# Patient Record
Sex: MALE | Race: WHITE | Employment: OTHER | URBAN - METROPOLITAN AREA
[De-identification: names, ages, dates, MRNs, and addresses within clinical notes are randomized per-mention and may not be internally consistent; named-entity substitution may affect disease eponyms.]

---

## 2017-09-29 ENCOUNTER — OFFICE VISIT (OUTPATIENT)
Dept: INTERNAL MEDICINE | Facility: CLINIC | Age: 62
End: 2017-09-29
Payer: COMMERCIAL

## 2017-09-29 ENCOUNTER — NURSE TRIAGE (OUTPATIENT)
Dept: NURSING | Facility: CLINIC | Age: 62
End: 2017-09-29

## 2017-09-29 VITALS
HEART RATE: 72 BPM | WEIGHT: 237.9 LBS | DIASTOLIC BLOOD PRESSURE: 78 MMHG | BODY MASS INDEX: 30.53 KG/M2 | SYSTOLIC BLOOD PRESSURE: 120 MMHG | OXYGEN SATURATION: 94 % | HEIGHT: 74 IN | TEMPERATURE: 98.3 F

## 2017-09-29 DIAGNOSIS — M75.91 RIGHT SUPRASPINATUS TENDINITIS: Primary | ICD-10-CM

## 2017-09-29 DIAGNOSIS — Z12.5 SCREENING FOR PROSTATE CANCER: ICD-10-CM

## 2017-09-29 DIAGNOSIS — Z13.6 CARDIOVASCULAR SCREENING; LDL GOAL LESS THAN 160: ICD-10-CM

## 2017-09-29 PROCEDURE — 99203 OFFICE O/P NEW LOW 30 MIN: CPT | Performed by: INTERNAL MEDICINE

## 2017-09-29 RX ORDER — DIPHENOXYLATE HYDROCHLORIDE AND ATROPINE SULFATE 2.5; .025 MG/1; MG/1
TABLET ORAL
COMMUNITY
Start: 2017-05-24 | End: 2020-11-03

## 2017-09-29 RX ORDER — COCONUT OIL
4000 OIL (ML) MISCELLANEOUS
COMMUNITY
Start: 2017-05-24 | End: 2020-11-03

## 2017-09-29 NOTE — NURSING NOTE
"Chief Complaint   Patient presents with     Shoulder Pain       Initial /78  Pulse 72  Temp 98.3  F (36.8  C) (Oral)  Ht 6' 2\" (1.88 m)  Wt 237 lb 14.4 oz (107.9 kg)  SpO2 94%  BMI 30.54 kg/m2 Estimated body mass index is 30.54 kg/(m^2) as calculated from the following:    Height as of this encounter: 6' 2\" (1.88 m).    Weight as of this encounter: 237 lb 14.4 oz (107.9 kg).  Medication Reconciliation: complete    "

## 2017-09-29 NOTE — MR AVS SNAPSHOT
After Visit Summary   9/29/2017    Jaswinder Monroe    MRN: [id number]           Patient Information     Date Of Birth          1955        Visit Information        Provider Department      9/29/2017 7:00 AM Antwan Ron MD [facility]        Today's Diagnoses     Right supraspinatus tendinitis    -  1       Follow-ups after your visit        Additional Services     STEV[facility] PT, HAND, AND CHIROPRACTIC REFERRAL       **This order will print in the [facility] Scheduling Office**    Physical Therapy, Hand Therapy and Chiropractic Care are available through:    *[facility]  *[facility]  *[facility]    Call one number to schedule at any of the above locations: [phone].    Your provider has referred you to: Physical Therapy at [facility] or [facility]    Indication/Reason for Referral: Shoulder Pain  Onset of Illness: month+  Therapy Orders: Evaluate and Treat  Special Programs: None  Special Request: None    Fred Ibarra      Additional Comments for the Therapist or Chiropractor:     Please be aware that coverage of these services is subject to the terms and limitations of your health insurance plan.  Call member services at your health plan with any benefit or coverage questions.      Please bring the following to your appointment:    *Your personal calendar for scheduling future appointments  *Comfortable clothing            SPORTS MEDICINE REFERRAL       Your provider has referred you to:  FM[facility]: [facility] - [facility]  [phone]   [email or web address]    Please be aware that coverage of these services is subject to the terms and limitations of your health insurance plan.  Call member services at your health plan with any benefit or coverage questions.      Please bring the following to your appointment:    >>    "Any x-rays, CTs or MRIs which have been performed.  Contact the facility where they were done to arrange for  prior to your scheduled appointment.    >>   List of current medications   >>   This referral request   >>   Any documents/labs given to you for this referral                  Who to contact     If you have questions or need follow up information about today's clinic visit or your schedule please contact [facility] directly at [phone].  Normal or non-critical lab and imaging results will be communicated to you by everyArthart, letter or phone within 4 business days after the clinic has received the results. If you do not hear from us within 7 days, please contact the clinic through everyArthart or phone. If you have a critical or abnormal lab result, we will notify you by phone as soon as possible.  Submit refill requests through Sentillion or call your pharmacy and they will forward the refill request to us. Please allow 3 business days for your refill to be completed.          Additional Information About Your Visit        Sentillion Information     Sentillion lets you send messages to your doctor, view your test results, renew your prescriptions, schedule appointments and more. To sign up, go to [email or web address] . Click on \"Log in\" on the left side of the screen, which will take you to the Welcome page. Then click on \"Sign up Now\" on the right side of the page.     You will be asked to enter the access code listed below, as well as some personal information. Please follow the directions to create your username and password.     Your access code is: [email or web address]  Expires: 2017  7:46 AM     Your access code will  in 90 days. If you need help or a new code, please call your [facility] clinic or [phone].        Care EveryWhere ID     This is your Care EveryWhere ID. This could be used by other organizations to access your [facility] medical records  [id number]      " "  Your Vitals Were     Pulse Temperature Height Pulse Oximetry BMI (Body Mass Index)       72 98.3  F (36.8  C) (Oral) 6' 2\" (1.88 m) 94% 30.54 kg/m2        Blood Pressure from Last 3 Encounters:   09/29/17 120/78    Weight from Last 3 Encounters:   09/29/17 237 lb 14.4 oz (107.9 kg)              We Performed the Following     STEVIE PT, HAND, AND CHIROPRACTIC REFERRAL     SPORTS MEDICINE REFERRAL        Primary Care Provider    None Specified       No primary provider on file.        Equal Access to Services     DENISE ECKERT : Hadii aad ku hadasho Soomaali, waaxda luqadaha, qaybta kaalmada adeegyada, kevin heath . So [facility] [phone].    ATENCIÓN: Si habla español, tiene a mckeon disposición servicios gratuitos de asistencia lingüística. Llame al [phone].    We comply with applicable federal civil rights laws and [place] laws. We do not discriminate on the basis of race, color, national origin, age, disability sex, sexual orientation or gender identity.            Thank you!     Thank you for choosing [facility]  for your care. Our goal is always to provide you with excellent care. Hearing back from our patients is one way we can continue to improve our services. Please take a few minutes to complete the written survey that you may receive in the mail after your visit with us. Thank you!             Your Updated Medication List - Protect others around you: Learn how to safely use, store and throw away your medicines at www.disposemymeds.org.          This list is accurate as of: 9/29/17  7:46 AM.  Always use your most recent med list.                   Brand Name Dispense Instructions for use Diagnosis    aspirin 81 MG EC tablet      Take 81 mg by mouth        cholecalciferol 5000 UNITS Caps capsule    vitamin D3     Take 5,000 Units by mouth        Coconut Oil Oil           MULTI-VITAMINS Tabs             "

## 2017-09-29 NOTE — PROGRESS NOTES
"  SUBJECTIVE:   Jaswinder Monroe is a 62 year old male who presents to clinic today for the following health issues:      Joint Pain    Onset: 3 months on and off    Description:   Location: right shoulder  Character: Dull ache    Intensity: mild    Progression of Symptoms: same    Accompanying Signs & Symptoms:  Other symptoms: none    History:   Previous similar pain: no       Precipitating factors:   Trauma or overuse: no     Alleviating factors:  Improved by: rest/inactivity    Therapies Tried and outcome: none      Problem list and histories reviewed & adjusted, as indicated.  Additional history: as documented    Labs reviewed in EPIC    Reviewed and updated as needed this visit by clinical staffAllergies       Reviewed and updated as needed this visit by Provider         ROS:  Constitutional, HEENT, cardiovascular, pulmonary, gi and gu systems are negative, except as otherwise noted.      OBJECTIVE:                                                    /78  Pulse 72  Temp 98.3  F (36.8  C) (Oral)  Ht 6' 2\" (1.88 m)  Wt 237 lb 14.4 oz (107.9 kg)  SpO2 94%  BMI 30.54 kg/m2  Body mass index is 30.54 kg/(m^2).  GENERAL APPEARANCE: healthy, alert and no distress  EYES: Eyes grossly normal to inspection  NECK: no adenopathy  CV: regular rates and rhythm  MS: R shoulder tender w/abduction above 150d and external rotation   SKIN: no suspicious lesions or rashes    Diagnostic test results:  none        ASSESSMENT/PLAN:                                                    1. Right supraspinatus tendinitis  Looks to be bursitis/tendonitis > tear. See STEVIE . If rehab fails then see sports med  - SPORTS MEDICINE REFERRAL  - [facility] PT, HAND, AND CHIROPRACTIC REFERRAL      Antwan Ron MD  [facility]  "

## 2017-10-11 ENCOUNTER — THERAPY VISIT (OUTPATIENT)
Dept: PHYSICAL THERAPY | Facility: CLINIC | Age: 62
End: 2017-10-11
Payer: COMMERCIAL

## 2017-10-11 DIAGNOSIS — M25.511 ACUTE PAIN OF RIGHT SHOULDER: Primary | ICD-10-CM

## 2017-10-11 PROCEDURE — 97110 THERAPEUTIC EXERCISES: CPT | Mod: GP | Performed by: PHYSICAL THERAPIST

## 2017-10-11 PROCEDURE — 97161 PT EVAL LOW COMPLEX 20 MIN: CPT | Mod: GP | Performed by: PHYSICAL THERAPIST

## 2017-10-11 NOTE — MR AVS SNAPSHOT
After Visit Summary   10/11/2017    Jaswinder Monroe    MRN: [id number]           Patient Information     Date Of Birth          1955        Visit Information        Provider Department      10/11/2017 3:20 PM Susu Echevarria PT [facility] Physical Therapy        Today's Diagnoses     Acute pain of right shoulder    -  1       Follow-ups after your visit        Who to contact     If you have questions or need follow up information about today's clinic visit or your schedule please contact [facility] PHYSICAL THERAPY directly at [phone].  Normal or non-critical lab and imaging results will be communicated to you by Cogenta Systemshart, letter or phone within 4 business days after the clinic has received the results. If you do not hear from us within 7 days, please contact the clinic through OneTrueFant or phone. If you have a critical or abnormal lab result, we will notify you by phone as soon as possible.  Submit refill requests through Plaxo or call your pharmacy and they will forward the refill request to us. Please allow 3 business days for your refill to be completed.          Additional Information About Your Visit        MyChart Information     Plaxo gives you secure access to your electronic health record. If you see a primary care provider, you can also send messages to your care team and make appointments. If you have questions, please call your primary care clinic.  If you do not have a primary care provider, please call [phone] and they will assist you.        Care EveryWhere ID     This is your Care EveryWhere ID. This could be used by other organizations to access your [facility] medical records  [id number]         Blood Pressure from Last 3 Encounters:   09/29/17 120/78    Weight from Last 3 Encounters:   09/29/17 107.9 kg (237 lb 14.4 oz)              We Performed the Following     SETVIE Inital Eval Report     PT Alex,  Low Complexity (78340)     Therapeutic Exercises        Primary Care Provider    None Specified       No primary provider on file.        Equal Access to Services     DENISE ECKERT : Hadii angel luis Laurent, oleg raymundo, patriasusie velezkaurjesu may, kevin renlinaanila hardy. So [facility] [phone].    ATENCIÓN: Si habla español, tiene a mckeon disposición servicios gratuitos de asistencia lingüística. Llame al [phone].    We comply with applicable federal civil rights laws and [place] laws. We do not discriminate on the basis of race, color, national origin, age, disability, sex, sexual orientation, or gender identity.            Thank you!     Thank you for choosing [facility] PHYSICAL THERAP[facility]  for your care. Our goal is always to provide you with excellent care. Hearing back from our patients is one way we can continue to improve our services. Please take a few minutes to complete the written survey that you may receive in the mail after your visit with us. Thank you!             Your Updated Medication List - Protect others around you: Learn how to safely use, store and throw away your medicines at www.disposemymeds.org.          This list is accurate as of: 10/11/17  7:35 PM.  Always use your most recent med list.                   Brand Name Dispense Instructions for use Diagnosis    aspirin 81 MG EC tablet      Take 81 mg by mouth        cholecalciferol 5000 UNITS Caps capsule    vitamin D3     Take 5,000 Units by mouth        Coconut Oil Oil           MULTI-VITAMINS Tabs

## 2017-10-11 NOTE — PROGRESS NOTES
Subjective:    Patient is a 62 year old male presenting with rehab right shoulder hpi. The history is provided by the patient.   Jaswinder Monroe is a 62 year old male with a right shoulder condition.  Condition occurred with:  Unknown cause.  Condition occurred: for unknown reasons.  This is a chronic condition  Onset of R shoulder pain 07/11/2017 for unknown reasons--just started noticing it.  It stayed flared-up for about a week and then resolved.  Flared-up again a few weeks later for about a week again and then resolved which was about 1 month ago now.  Currently has minimal to no pain and no activity limitations other than avoiding lifting since he thinks it might produce pain.  Patient is right-handed.    Patient reports pain:  Anterior.     and is intermittent and reported as 1/10.  Associated symptoms:  Loss of strength. Pain is the same all the time.  Exacerbated by: avoids lifting and more strenuous activity, unable to throw a ball. and relieved by rest.  Since onset symptoms are unchanged.  Special testing: none.  Previous treatment: none.    General health as reported by patient is excellent.  Pertinent medical history includes:  None.  Medical allergies: no.  Other surgeries include:  None reported.  Current medications:  None as reported by the patient.  Current occupation is .        Barriers include:  None as reported by the patient.    Red flags:  None as reported by the patient.                        Objective:    Standing Alignment:      Shoulder/UE:  Rounded shoulders, protracted scapula L and protracted scapula R                                       Shoulder Evaluation:  ROM:  AROM:  normal                            PROM:  normal (mild pain with overpressure into abduction initially but resolved with more reps of abduction)                            Pain: Concordant sign of decreased R ER strength--no effect after trials of repeated wand extension, wand extension with hand  on counter, IR/extension loaded.    Strength:    Flexion: Right: 5/5      Pain:  -    Abduction:  Right: 5/5      Pain:-    Internal Rotation:  Right: 5/5      Pain:-  External Rotation:   Right:4+/5      Pain:-                                                     General     ROS    Assessment/Plan:      Patient is a 62 year old male with right side shoulder complaints.  Symptoms were very mild with evaluation today.  The only concordant signs evident were pain with end range IR/extension on the R and decreased R ER strength.  Repeated motions in extension and IR/extension had no effect, and responses were difficult to assess due to his symptoms being so mild.  He was instructed in scapular and ER strengthening exercises to improve posture and shoulder mechanics and also instructed to call and follow-up if/when symptoms flared-up again for further assessment.  He will follow-up as needed.      Patient has the following significant findings with corresponding treatment plan.                Diagnosis 1:  R shoulder pain  Pain -  self management, education, directional preference exercise and home program  Decreased strength - therapeutic exercise, therapeutic activities and home program  Decreased function - therapeutic activities and home program  Impaired posture - neuro re-education and home program    Therapy Evaluation Codes:   1) History comprised of:   Personal factors that impact the plan of care:      None.    Comorbidity factors that impact the plan of care are:      None.     Medications impacting care: None.  2) Examination of Body Systems comprised of:   Body structures and functions that impact the plan of care:      Shoulder.   Activity limitations that impact the plan of care are:      Lifting.  3) Clinical presentation characteristics are:   Stable/Uncomplicated.  4) Decision-Making    Low complexity using standardized patient assessment instrument and/or measureable assessment of functional  outcome.  Cumulative Therapy Evaluation is: Low complexity.    Previous and current functional limitations:  (See Goal Flow Sheet for this information)    Short term and Long term goals: (See Goal Flow Sheet for this information)     Communication ability:  Patient appears to be able to clearly communicate and understand verbal and written communication and follow directions correctly.  Treatment Explanation - The following has been discussed with the patient:   RX ordered/plan of care  Anticipated outcomes  Possible risks and side effects  This patient would benefit from PT intervention to resume normal activities.   Rehab potential is good.    Frequency:  1 X a month, once daily  Duration:  for 2 months  Discharge Plan:  Achieve all LTG.  Independent in home treatment program.  Reach maximal therapeutic benefit.    Please refer to the daily flowsheet for treatment today, total treatment time and time spent performing 1:1 timed codes.

## 2017-10-20 ENCOUNTER — TRANSFERRED RECORDS (OUTPATIENT)
Dept: INTERNAL MEDICINE | Facility: CLINIC | Age: 62
End: 2017-10-20

## 2017-10-20 NOTE — PROGRESS NOTES
Received transfer of records from [facility]. Sent to Dr Ron.  Electronically filed by Kim Mcardle     10/20/2017  4:15 PM

## 2017-11-01 ENCOUNTER — OFFICE VISIT (OUTPATIENT)
Dept: INTERNAL MEDICINE | Facility: CLINIC | Age: 62
End: 2017-11-01
Payer: COMMERCIAL

## 2017-11-01 VITALS
DIASTOLIC BLOOD PRESSURE: 78 MMHG | OXYGEN SATURATION: 94 % | TEMPERATURE: 98.2 F | SYSTOLIC BLOOD PRESSURE: 108 MMHG | HEART RATE: 70 BPM | BODY MASS INDEX: 30.67 KG/M2 | WEIGHT: 239 LBS | HEIGHT: 74 IN

## 2017-11-01 DIAGNOSIS — Z12.5 SCREENING FOR PROSTATE CANCER: ICD-10-CM

## 2017-11-01 DIAGNOSIS — Z12.11 COLON CANCER SCREENING: ICD-10-CM

## 2017-11-01 DIAGNOSIS — E78.2 MIXED HYPERLIPIDEMIA: ICD-10-CM

## 2017-11-01 DIAGNOSIS — R97.20 ELEVATED PROSTATE SPECIFIC ANTIGEN (PSA): ICD-10-CM

## 2017-11-01 DIAGNOSIS — L72.3 SEBACEOUS CYST: ICD-10-CM

## 2017-11-01 DIAGNOSIS — Z23 NEED FOR PROPHYLACTIC VACCINATION AND INOCULATION AGAINST INFLUENZA: ICD-10-CM

## 2017-11-01 DIAGNOSIS — Z00.01 ENCOUNTER FOR ROUTINE ADULT MEDICAL EXAM WITH ABNORMAL FINDINGS: Primary | ICD-10-CM

## 2017-11-01 DIAGNOSIS — Z13.6 CARDIOVASCULAR SCREENING; LDL GOAL LESS THAN 160: ICD-10-CM

## 2017-11-01 LAB
ANION GAP SERPL CALCULATED.3IONS-SCNC: 9 MMOL/L (ref 3–14)
BUN SERPL-MCNC: 16 MG/DL (ref 7–30)
CALCIUM SERPL-MCNC: 9.2 MG/DL (ref 8.5–10.1)
CHLORIDE SERPL-SCNC: 104 MMOL/L (ref 94–109)
CHOLEST SERPL-MCNC: 226 MG/DL
CO2 SERPL-SCNC: 26 MMOL/L (ref 20–32)
CREAT SERPL-MCNC: 1.13 MG/DL (ref 0.66–1.25)
GFR SERPL CREATININE-BSD FRML MDRD: 66 ML/MIN/1.7M2
GLUCOSE SERPL-MCNC: 97 MG/DL (ref 70–99)
HDLC SERPL-MCNC: 43 MG/DL
LDLC SERPL CALC-MCNC: 155 MG/DL
NONHDLC SERPL-MCNC: 183 MG/DL
POTASSIUM SERPL-SCNC: 4.2 MMOL/L (ref 3.4–5.3)
PSA SERPL-ACNC: 4.08 UG/L (ref 0–4)
SODIUM SERPL-SCNC: 139 MMOL/L (ref 133–144)
TRIGL SERPL-MCNC: 138 MG/DL

## 2017-11-01 PROCEDURE — G0103 PSA SCREENING: HCPCS | Performed by: INTERNAL MEDICINE

## 2017-11-01 PROCEDURE — 36415 COLL VENOUS BLD VENIPUNCTURE: CPT | Performed by: INTERNAL MEDICINE

## 2017-11-01 PROCEDURE — 99396 PREV VISIT EST AGE 40-64: CPT | Performed by: INTERNAL MEDICINE

## 2017-11-01 PROCEDURE — 80061 LIPID PANEL: CPT | Performed by: INTERNAL MEDICINE

## 2017-11-01 PROCEDURE — 80048 BASIC METABOLIC PNL TOTAL CA: CPT | Performed by: INTERNAL MEDICINE

## 2017-11-01 NOTE — MR AVS SNAPSHOT
After Visit Summary   11/1/2017    Jaswinder Monroe    MRN: [id number]           Patient Information     Date Of Birth          1955        Visit Information        Provider Department      11/1/2017 7:00 AM Antwan Ron MD [facility]        Today's Diagnoses     Encounter for routine adult medical exam with abnormal findings    -  1    Need for prophylactic vaccination and inoculation against influenza        Screening for prostate cancer        CARDIOVASCULAR SCREENING; LDL GOAL LESS THAN 160        Sebaceous cyst        Colon cancer screening          Care Instructions    The heart-healthy Mediterranean is a healthy eating plan based on typical foods and recipes of Mediterranean-style cooking. Here's how to adopt the Mediterranean diet.   If you're looking for a heart-healthy eating plan, the Mediterranean diet might be right for you. The Mediterranean diet incorporates the basics of healthy eating -- plus a splash of flavorful olive oil and perhaps even a glass of red wine -- among other components characterizing the traditional cooking style of countries bordering the [place].  Most healthy diets include fruits, vegetables, fish and whole grains, and limit unhealthy fats. While these parts of a healthy diet remain tried-and-true, subtle variations or differences in proportions of certain foods may make a difference in your risk of heart disease.  Research has shown that the traditional Mediterranean diet reduces the risk of heart disease. In fact, an analysis of more than 1.5 million healthy adults demonstrated that following a Mediterranean diet was associated with a reduced risk of death from heart disease and cancer, as well as a reduced incidence of Parkinson's and Alzheimer's diseases.   The Dietary Guidelines for Americans recommends the Mediterranean diet as an eating plan that can help promote health and prevent disease. And the Mediterranean  "diet is one your whole family can follow for good health.   The Mediterranean diet emphasizes:  Eating primarily plant-based foods, such as fruits and vegetables, whole grains, legumes and nuts   Replacing butter with healthy fats, such as olive oil   Using herbs and spices instead of salt to flavor foods   Limiting red meat to no more than a few times a month   Eating fish and poultry at least twice a week   Drinking red wine in moderation (optional)  The diet also recognizes the importance of being physically active, and enjoying meals with family and friends.  The Mediterranean diet traditionally includes fruits, vegetables and grains. For example, residents of [facility] average six or more servings a day of antioxidant-rich fruits and vegetables.  Grains in the Mediterranean region are typically whole grain and usually contain very few unhealthy trans fats, and bread is an important part of the diet. However, throughout the Mediterranean region, bread is eaten plain or dipped in olive oil -- not eaten with butter or margarine, which contains saturated or trans fats.   Nuts are another part of a healthy Mediterranean diet. Nuts are high in fat, but most of the fat is healthy. Because nuts are high in calories, they should not be eaten in large amounts -- generally no more than a handful a day. For the best nutrition, avoid candied or honey-roasted and heavily salted nuts.  The focus of the Mediterranean diet isn't on limiting total fat consumption, but rather on choosing healthier types of fat. The Mediterranean diet discourages saturated fats and hydrogenated oils (trans fats), both of which contribute to heart disease.  The Mediterranean diet features olive oil as the primary source of fat. Olive oil is mainly monounsaturated fat -- a type of fat that can help reduce low-density lipoprotein (LDL) cholesterol levels when used in place of saturated or trans fats. \"Extra-virgin\" and \"virgin\" olive oils (the least " processed forms) also contain the highest levels of protective plant compounds that provide antioxidant effects.  Canola oil and some nuts contain the beneficial linolenic acid (a type of omega-3 fatty acid) in addition to healthy unsaturated fat. Omega-3 fatty acids lower triglycerides, decrease blood clotting, and are associated with decreased incidence of sudden heart attacks, improve the health of your blood vessels, and help moderate blood pressure. Fatty fish -- such as mackerel, lake trout, herring, sardines, albacore tuna and salmon -- are rich sources of omega-3 fatty acids. Fish is eaten on a regular basis in the Mediterranean diet.  The health effects of alcohol have been debated for many years, and some doctors are reluctant to encourage alcohol consumption because of the health consequences of excessive drinking. However, alcohol -- in moderation -- has been associated with a reduced risk of heart disease in some research studies.  The Mediterranean diet typically includes a moderate amount of wine, usually red wine. This means no more than 5 ounces (148 milliliters) of wine daily for women of all ages and men older than age 65 and no more than 10 ounces (296 milliliters) of wine daily for younger men. More than this may increase the risk of health problems, including increased risk of certain types of cancer.   If you're unable to limit your alcohol intake to the amounts defined above, if you have a personal or family history of alcohol abuse, or if you have heart or liver disease, refrain from drinking wine or any other alcohol.  The Mediterranean diet is a delicious and healthy way to eat. Many people who switch to this style of eating say they'll never eat any other way. Here are some specific steps to get you started:   Eat your veggies and fruits -- and switch to whole grains. Avariety of plant foods should make up the majority of your meals. They should be minimally processed -- fresh and whole  are best. Include veggies and fruits in every meal and eat them for snacks as well. Switch to whole-grain bread and cereal, and begin to eat more whole-grain rice and pasta products. Keep baby carrots, apples and bananas on hand for quick, satisfying snacks. Fruit salads are a wonderful way to eat a variety of healthy fruit.   Go nuts. Nuts and seeds are good sources of fiber, protein and healthy fats. Keep almonds, cashews, pistachios and walnuts on hand for a quick snack. Choose natural peanut butter, rather than the kind with hydrogenated fat added. Try blended sesame seeds (tahini) as a dip or spread for bread.   Pass on the butter. Try olive or canola oil as a healthy replacement for butter or margarine. Lightly drizzle it over vegetables. After cooking pasta, add a touch of olive oil, some garlic and green onions for flavoring. Dip bread in flavored olive oil or lightly spread it on whole-grain bread for a tasty alternative to butter. Try tahini as a dip or spread for bread too.   Spice it up. Herbs and spices make food tasty and can  for salt and fat in recipes.   Go fish. Eat fish at least twice a week. Fresh or water-packed tuna, salmon, trout, mackerel and herring are healthy choices. [place], bake or broil fish for great taste and easy cleanup. Avoid breaded and fried fish.   Rein in the red meat. Limit red meat to no more than a few times a month. Substitute fish and poultry for red meat. When choosing red meat, make sure it's lean and keep portions small (about the size of a deck of cards). Also avoid sausage, hernandez and other high-fat, processed meats.   Choose low-fat dairy. Limit higher fat dairy products, such as whole or 2 percent milk, cheese and ice cream. Switch to skim milk, fat-free yogurt and low-fat cheese            Follow-ups after your visit        Additional Services     GASTROENTEROLOGY ADULT REF PROCEDURE ONLY       Last Lab Result: No results found for: CR  Body mass index is  30.69 kg/(m^2).     Needed:  No  Language:  English    Patient will be contacted to schedule procedure.     Please be aware that coverage of these services is subject to the terms and limitations of your health insurance plan.  Call member services at your health plan with any benefit or coverage questions.  Any procedures must be performed at a [facility] facility OR coordinated by your clinic's referral office.    Please bring the following with you to your appointment:    (1) Any X-Rays, CTs or MRIs which have been performed.  Contact the facility where they were done to arrange for  prior to your scheduled appointment.    (2) List of current medications   (3) This referral request   (4) Any documents/labs given to you for this referral            GENERAL SURG ADULT REFERRAL       Your provider has referred you to: PHANI: [facility] -  [place] [phone]   [email or web address]  [place] [phone]   [email or web address]    Please be aware that coverage of these services is subject to the terms and limitations of your health insurance plan.  Call member services at your health plan with any benefit or coverage questions.      Please bring the following with you to your appointment:    (1) Any X-Rays, CTs or MRIs which have been performed.  Contact the facility where they were done to arrange for  prior to your scheduled appointment.   (2) List of current medications   (3) This referral request   (4) Any documents/labs given to you for this referral                  Who to contact     If you have questions or need follow up information about today's clinic visit or your schedule please contact [facility] directly at [phone].  Normal or non-critical lab and imaging results will be communicated to you by MyChart, letter or phone within 4 business days after the clinic has received  "the results. If you do not hear from us within 7 days, please contact the clinic through [employer] or phone. If you have a critical or abnormal lab result, we will notify you by phone as soon as possible.  Submit refill requests through [employer] or call your pharmacy and they will forward the refill request to us. Please allow 3 business days for your refill to be completed.          Additional Information About Your Visit        [employer] Information     [employer] gives you secure access to your electronic health record. If you see a primary care provider, you can also send messages to your care team and make appointments. If you have questions, please call your primary care clinic.  If you do not have a primary care provider, please call [phone] and they will assist you.        Care EveryWhere ID     This is your Care EveryWhere ID. This could be used by other organizations to access your [facility] medical records  [id number]        Your Vitals Were     Pulse Temperature Height Pulse Oximetry BMI (Body Mass Index)       70 98.2  F (36.8  C) (Oral) 6' 2\" (1.88 m) 94% 30.69 kg/m2        Blood Pressure from Last 3 Encounters:   11/01/17 108/78   09/29/17 120/78    Weight from Last 3 Encounters:   11/01/17 239 lb (108.4 kg)   09/29/17 237 lb 14.4 oz (107.9 kg)              We Performed the Following     Basic metabolic panel     FLU VAC, SPLIT VIRUS IM > 3 YO (QUADRIVALENT) [04815]     GASTROENTEROLOGY ADULT REF PROCEDURE ONLY     GENERAL SURG ADULT REFERRAL     Lipid panel reflex to direct LDL     Prostate spec antigen screen     Vaccine Administration, Initial [36873]        Primary Care Provider Office Phone # Fax #    Antwan Ron [phone]       [address] [facility] [address]        Equal Access to Services     DENISE ECKERT : Juno Laurent, oleg raymundo, kevin neal. So [facility] [phone].    ATENCIÓN: Si " jackie lamb, tiene a mckeon disposición servicios gratuitos de asistencia lingüística. Nehemias sotelo [phone].    We comply with applicable federal civil rights laws and [place] laws. We do not discriminate on the basis of race, color, national origin, age, disability, sex, sexual orientation, or gender identity.            Thank you!     Thank you for choosing [facility]  for your care. Our goal is always to provide you with excellent care. Hearing back from our patients is one way we can continue to improve our services. Please take a few minutes to complete the written survey that you may receive in the mail after your visit with us. Thank you!             Your Updated Medication List - Protect others around you: Learn how to safely use, store and throw away your medicines at www.disposemymeds.org.          This list is accurate as of: 11/1/17  7:51 AM.  Always use your most recent med list.                   Brand Name Dispense Instructions for use Diagnosis    aspirin 81 MG EC tablet      Take 81 mg by mouth        cholecalciferol 5000 UNITS Caps capsule    vitamin D3     Take 5,000 Units by mouth        Coconut Oil Oil           MULTI-VITAMINS Tabs

## 2017-11-01 NOTE — PROGRESS NOTES
SUBJECTIVE:   CC: Jaswinder Monroe is an 62 year old male who presents for preventative health visit.     Physical   Annual:     Getting at least 3 servings of Calcium per day::  Yes    Bi-annual eye exam::  Yes    Dental care twice a year::  Yes    Sleep apnea or symptoms of sleep apnea::  None    Diet::  Breakfast skipped    Frequency of exercise::  None    Taking medications regularly::  Yes    Medication side effects::  None    Additional concerns today::  YES      1. Lump above L knee- noticeably a bit larger and discoloration.     Today's PHQ-2 Score:   PHQ-2 ( 1999 Pfizer) 11/1/2017   Q1: Little interest or pleasure in doing things 0   Q2: Feeling down, depressed or hopeless 0   PHQ-2 Score 0   Q1: Little interest or pleasure in doing things Not at all   Q2: Feeling down, depressed or hopeless Not at all   PHQ-2 Score 0       Abuse: Current or Past(Physical, Sexual or Emotional)- No  Do you feel safe in your environment - Yes    Social History   Substance Use Topics     Smoking status: Never Smoker     Smokeless tobacco: Never Used     Alcohol use 2.4 oz/week     4 Standard drinks or equivalent per week      Comment: social     The patient does not drink >3 drinks per day nor >7 drinks per week.    Last PSA:   PSA   Date Value Ref Range Status   11/01/2017 4.08 (H) 0 - 4 ug/L Final     Comment:     Assay Method:  Chemiluminescence using Siemens Vista analyzer       Reviewed orders with patient. Reviewed health maintenance and updated orders accordingly - Yes  Labs reviewed in EPIC    Reviewed and updated as needed this visit by clinical staff  Tobacco  Allergies  Meds  Problems         Reviewed and updated as needed this visit by Provider  Allergies  Meds  Problems            Review of Systems  C: NEGATIVE for fever, chills, change in weight  I: NEGATIVE for worrisome rashes, moles or lesions  E: NEGATIVE for vision changes or irritation  ENT: NEGATIVE for ear, mouth and throat problems  R: NEGATIVE for  "significant cough or SOB  CV: NEGATIVE for chest pain, palpitations or peripheral edema  GI: NEGATIVE for nausea, abdominal pain, heartburn, or change in bowel habits   male: negative for dysuria, hematuria, decreased urinary stream, erectile dysfunction, urethral discharge  M: NEGATIVE for significant arthralgias or myalgia  N: NEGATIVE for weakness, dizziness or paresthesias  P: NEGATIVE for changes in mood or affect    OBJECTIVE:   /78  Pulse 70  Temp 98.2  F (36.8  C) (Oral)  Ht 6' 2\" (1.88 m)  Wt 239 lb (108.4 kg)  SpO2 94%  BMI 30.69 kg/m2    Physical Exam  GENERAL: healthy, alert and no distress  EYES: Eyes grossly normal to inspection, PERRL and conjunctivae and sclerae normal  HENT: ear canals and TM's normal, nose and mouth without ulcers or lesions  NECK: no adenopathy, no asymmetry, masses, or scars and thyroid normal to palpation  RESP: lungs clear to auscultation - no rales, rhonchi or wheezes  CV: regular rate and rhythm, normal S1 S2, no S3 or S4, no murmur, click or rub, no peripheral edema and peripheral pulses strong  ABDOMEN: soft, nontender, no hepatosplenomegaly, no masses and bowel sounds normal  MS: no gross musculoskeletal defects noted, no edema  SKIN: larger smooth mobile mass in L thigh consistent with seb. cyst  NEURO: Normal strength and tone, mentation intact and speech normal  PSYCH: mentation appears normal, affect normal/bright    Results for orders placed or performed in visit on 11/01/17   Basic metabolic panel   Result Value Ref Range    Sodium 139 133 - 144 mmol/L    Potassium 4.2 3.4 - 5.3 mmol/L    Chloride 104 94 - 109 mmol/L    Carbon Dioxide 26 20 - 32 mmol/L    Anion Gap 9 3 - 14 mmol/L    Glucose 97 70 - 99 mg/dL    Urea Nitrogen 16 7 - 30 mg/dL    Creatinine 1.13 0.66 - 1.25 mg/dL    GFR Estimate 66 >60 mL/min/1.7m2    GFR Estimate If Black 80 >60 mL/min/1.7m2    Calcium 9.2 8.5 - 10.1 mg/dL   Lipid panel reflex to direct LDL   Result Value Ref Range    " "Cholesterol 226 (H) <200 mg/dL    Triglycerides 138 <150 mg/dL    HDL Cholesterol 43 >39 mg/dL    LDL Cholesterol Calculated 155 (H) <100 mg/dL    Non HDL Cholesterol 183 (H) <130 mg/dL   Prostate spec antigen screen   Result Value Ref Range    PSA 4.08 (H) 0 - 4 ug/L      ASSESSMENT/PLAN:   1. Encounter for routine adult medical exam with abnormal findings  Needs colon ca screening , see below for prostate and CV screening recommendations    2. Need for prophylactic vaccination and inoculation against influenza  - FLU VAC, SPLIT VIRUS IM > 3 YO (QUADRIVALENT) [26508]  - Vaccine Administration, Initial [15622]    3. Screening for prostate cancer  - Prostate spec antigen screen  His PSA is now slightly above 4-  In 2014 this was 2.34 and in 2013 1.73. fam hx prostate ca in father.  -rec'd f/u here for CHARIS and discussion vs urology referral    4. CARDIOVASCULAR SCREENING; LDL GOAL LESS THAN 160  The 10-year ASCVD risk score (Roxnasim HERRERA Jr, et al., 2013) is: 8.1%    Values used to calculate the score:      Age: 62 years      Sex: Male      Is Non- : No      Diabetic: No      Tobacco smoker: No      Systolic Blood Pressure: 108 mmHg      Is BP treated: No      HDL Cholesterol: 43 mg/dL      Total Cholesterol: 226 mg/dL   Borderline for statin use- Mediterranean diet vs. Statin   - Basic metabolic panel  - Lipid panel reflex to direct LDL    5. Sebaceous cyst  - GENERAL SURG ADULT REFERRAL    6. Colon cancer screening  - GASTROENTEROLOGY ADULT REF PROCEDURE ONLY    COUNSELING:   Reviewed preventive health counseling, as reflected in patient instructions           reports that he has never smoked. He has never used smokeless tobacco.      Estimated body mass index is 30.69 kg/(m^2) as calculated from the following:    Height as of this encounter: 6' 2\" (1.88 m).    Weight as of this encounter: 239 lb (108.4 kg).         Counseling Resources:  ATP IV Guidelines  Pooled Cohorts Equation Calculator  FRAX " Risk Assessment  ICSI Preventive Guidelines  Dietary Guidelines for Americans, 2010  "[employer]s MyPlate  ASA Prophylaxis  Lung CA Screening    Antwan Ron MD  [facility] for HPI/ROS submitted by the patient on 11/1/2017   PHQ-2 Score: 0    Injectable Influenza Immunization Documentation    1.  Is the person to be vaccinated sick today?   No    2. Does the person to be vaccinated have an allergy to a component   of the vaccine?   No  Egg Allergy Algorithm Link    3. Has the person to be vaccinated ever had a serious reaction   to influenza vaccine in the past?   No    4. Has the person to be vaccinated ever had Guillain-Barré syndrome?   No    Form completed by Corinna Becker MA

## 2017-11-01 NOTE — NURSING NOTE
"Chief Complaint   Patient presents with     Physical     Mass     L knee       Initial /78  Pulse 70  Temp 98.2  F (36.8  C) (Oral)  Ht 6' 2\" (1.88 m)  Wt 239 lb (108.4 kg)  SpO2 94%  BMI 30.69 kg/m2 Estimated body mass index is 30.69 kg/(m^2) as calculated from the following:    Height as of this encounter: 6' 2\" (1.88 m).    Weight as of this encounter: 239 lb (108.4 kg).  Medication Reconciliation: complete    Corinna Becker MA   "

## 2017-11-01 NOTE — PATIENT INSTRUCTIONS
The heart-healthy Mediterranean is a healthy eating plan based on typical foods and recipes of Mediterranean-style cooking. Here's how to adopt the Mediterranean diet.   If you're looking for a heart-healthy eating plan, the Mediterranean diet might be right for you. The Mediterranean diet incorporates the basics of healthy eating -- plus a splash of flavorful olive oil and perhaps even a glass of red wine -- among other components characterizing the traditional cooking style of countries bordering the [place].  Most healthy diets include fruits, vegetables, fish and whole grains, and limit unhealthy fats. While these parts of a healthy diet remain tried-and-true, subtle variations or differences in proportions of certain foods may make a difference in your risk of heart disease.  Research has shown that the traditional Mediterranean diet reduces the risk of heart disease. In fact, an analysis of more than 1.5 million healthy adults demonstrated that following a Mediterranean diet was associated with a reduced risk of death from heart disease and cancer, as well as a reduced incidence of Parkinson's and Alzheimer's diseases.   The Dietary Guidelines for Americans recommends the Mediterranean diet as an eating plan that can help promote health and prevent disease. And the Mediterranean diet is one your whole family can follow for good health.   The Mediterranean diet emphasizes:  Eating primarily plant-based foods, such as fruits and vegetables, whole grains, legumes and nuts   Replacing butter with healthy fats, such as olive oil   Using herbs and spices instead of salt to flavor foods   Limiting red meat to no more than a few times a month   Eating fish and poultry at least twice a week   Drinking red wine in moderation (optional)  The diet also recognizes the importance of being physically active, and enjoying meals with family and friends.  The Mediterranean diet traditionally includes fruits,  "vegetables and grains. For example, residents of [facility] average six or more servings a day of antioxidant-rich fruits and vegetables.  Grains in the Mediterranean region are typically whole grain and usually contain very few unhealthy trans fats, and bread is an important part of the diet. However, throughout the Mediterranean region, bread is eaten plain or dipped in olive oil -- not eaten with butter or margarine, which contains saturated or trans fats.   Nuts are another part of a healthy Mediterranean diet. Nuts are high in fat, but most of the fat is healthy. Because nuts are high in calories, they should not be eaten in large amounts -- generally no more than a handful a day. For the best nutrition, avoid candied or honey-roasted and heavily salted nuts.  The focus of the Mediterranean diet isn't on limiting total fat consumption, but rather on choosing healthier types of fat. The Mediterranean diet discourages saturated fats and hydrogenated oils (trans fats), both of which contribute to heart disease.  The Mediterranean diet features olive oil as the primary source of fat. Olive oil is mainly monounsaturated fat -- a type of fat that can help reduce low-density lipoprotein (LDL) cholesterol levels when used in place of saturated or trans fats. \"Extra-virgin\" and \"virgin\" olive oils (the least processed forms) also contain the highest levels of protective plant compounds that provide antioxidant effects.  Canola oil and some nuts contain the beneficial linolenic acid (a type of omega-3 fatty acid) in addition to healthy unsaturated fat. Omega-3 fatty acids lower triglycerides, decrease blood clotting, and are associated with decreased incidence of sudden heart attacks, improve the health of your blood vessels, and help moderate blood pressure. Fatty fish -- such as mackerel, lake trout, herring, sardines, albacore tuna and salmon -- are rich sources of omega-3 fatty acids. Fish is eaten on a regular basis in " the Mediterranean diet.  The health effects of alcohol have been debated for many years, and some doctors are reluctant to encourage alcohol consumption because of the health consequences of excessive drinking. However, alcohol -- in moderation -- has been associated with a reduced risk of heart disease in some research studies.  The Mediterranean diet typically includes a moderate amount of wine, usually red wine. This means no more than 5 ounces (148 milliliters) of wine daily for women of all ages and men older than age 65 and no more than 10 ounces (296 milliliters) of wine daily for younger men. More than this may increase the risk of health problems, including increased risk of certain types of cancer.   If you're unable to limit your alcohol intake to the amounts defined above, if you have a personal or family history of alcohol abuse, or if you have heart or liver disease, refrain from drinking wine or any other alcohol.  The Mediterranean diet is a delicious and healthy way to eat. Many people who switch to this style of eating say they'll never eat any other way. Here are some specific steps to get you started:   Eat your veggies and fruits -- and switch to whole grains. Avariety of plant foods should make up the majority of your meals. They should be minimally processed -- fresh and whole are best. Include veggies and fruits in every meal and eat them for snacks as well. Switch to whole-grain bread and cereal, and begin to eat more whole-grain rice and pasta products. Keep baby carrots, apples and bananas on hand for quick, satisfying snacks. Fruit salads are a wonderful way to eat a variety of healthy fruit.   Go nuts. Nuts and seeds are good sources of fiber, protein and healthy fats. Keep almonds, cashews, pistachios and walnuts on hand for a quick snack. Choose natural peanut butter, rather than the kind with hydrogenated fat added. Try blended sesame seeds (tahini) as a dip or spread for bread.    Pass on the butter. Try olive or canola oil as a healthy replacement for butter or margarine. Lightly drizzle it over vegetables. After cooking pasta, add a touch of olive oil, some garlic and green onions for flavoring. Dip bread in flavored olive oil or lightly spread it on whole-grain bread for a tasty alternative to butter. Try tahini as a dip or spread for bread too.   Spice it up. Herbs and spices make food tasty and can  for salt and fat in recipes.   Go fish. Eat fish at least twice a week. Fresh or water-packed tuna, salmon, trout, mackerel and herring are healthy choices. [place], bake or broil fish for great taste and easy cleanup. Avoid breaded and fried fish.   Rein in the red meat. Limit red meat to no more than a few times a month. Substitute fish and poultry for red meat. When choosing red meat, make sure it's lean and keep portions small (about the size of a deck of cards). Also avoid sausage, hernandez and other high-fat, processed meats.   Choose low-fat dairy. Limit higher fat dairy products, such as whole or 2 percent milk, cheese and ice cream. Switch to skim milk, fat-free yogurt and low-fat cheese

## 2017-11-06 ENCOUNTER — OFFICE VISIT (OUTPATIENT)
Dept: SURGERY | Facility: CLINIC | Age: 62
End: 2017-11-06
Payer: COMMERCIAL

## 2017-11-06 VITALS
DIASTOLIC BLOOD PRESSURE: 76 MMHG | WEIGHT: 239 LBS | BODY MASS INDEX: 30.67 KG/M2 | SYSTOLIC BLOOD PRESSURE: 128 MMHG | HEIGHT: 74 IN | HEART RATE: 83 BPM | OXYGEN SATURATION: 97 %

## 2017-11-06 DIAGNOSIS — R22.9 LOCALIZED SUPERFICIAL SWELLING, MASS, OR LUMP: Primary | ICD-10-CM

## 2017-11-06 PROCEDURE — 99243 OFF/OP CNSLTJ NEW/EST LOW 30: CPT | Performed by: SURGERY

## 2017-11-06 NOTE — LETTER
"2017    Re: Jaswinder Monroe : 1955    Assessment:   Jaswinder Monroe is seen in consultation for lump, at the request of Antwan Ron MD.     3 cm left distal anterior thigh lump.      Plan:  We have discussed the options of observation and excision.  He elects excisional biopsy.  We discussed the indications, alternatives, and risks.  We discussed scarring, numbness, anesthesia, infection, bleeding, and recurrence.     We will schedule surgery at the patient's convenience.     HPI:  Jaswinder presents today for a left distal anterior thigh lump for the past 3 months. He denies a history of trauma at the site other than pressure on the area when crossing his legs.  He has had no drainage from the site in the past.  He does not have a history of previous infections at this site.  He reports a similar lump removed from his low back in the past.  He denies a family history of such masses.  It is intermittently painful with pressure.  It's size is increasing.     Past Medical History:  Has a past medical history of Fibula fracture.     Family history reviewed and not pertinent.     ROS:  The 10 point review of systems is negative other than noted in the HPI and above.     PE:  /76 (BP Location: Left arm, Cuff Size: Adult Large)  Pulse 83  Ht 6' 2\" (1.88 m)  Wt 239 lb (108.4 kg)  SpO2 97%  BMI 30.69 kg/m2  General appearance: well-developed, well-nourished, no apparent distress  HEENT:  Head normocephalic and atraumatic, pupils equal and round, conjunctivae clear, mucous membranes moist, external ears and nose normal  Lungs: respirations unlabored  Musculoskeletal:  Normal station and gait  Extremities: warm, without edema  Neurologic: alert, speech is clear, moves all extremities with good strength  Psychiatric: Mood and affect are appropriate  Skin: There is a 3 cm dermal cyst on/in the left distal anterior thigh with overlying punctum.  The overlying skin is otherwise normal.  It is " non-tender.        Milena English MD

## 2017-11-06 NOTE — PROGRESS NOTES
HPI      ROS (Review of Systems):      Positive for System Review.  System Review has been done        Physical Exam      Assessment:   Jaswinder Monroe is seen in consultation for lump, at the request of Antwan Ron MD.    3 cm left distal anterior thigh lump.     Plan:  We have discussed the options of observation and excision.  He elects excisional biopsy.  We discussed the indications, alternatives, and risks.  We discussed scarring, numbness, anesthesia, infection, bleeding, and recurrence.    We will schedule surgery at the patient's convenience.      HPI:  Jaswinder presents today for a left distal anterior thigh lump for the past 3 months. He denies a history of trauma at the site other than pressure on the area when crossing his legs.  He has had no drainage from the site in the past.  He does not have a history of previous infections at this site.  He reports a similar lump removed from his low back in the past.  He denies a family history of such masses.  It is intermittently painful with pressure.  It's size is increasing.    Past Medical History:   has a past medical history of Fibula fracture.    Past Surgical History:  Past Surgical History:   Procedure Laterality Date     VASECTOMY          Social History:  Social History     Social History     Marital status:      Spouse name: N/A     Number of children: 4     Years of education: N/A     Occupational History      [employer]     Social History Main Topics     Smoking status: Never Smoker     Smokeless tobacco: Never Used     Alcohol use 2.4 oz/week     4 Standard drinks or equivalent per week      Comment: social     Drug use: No     Sexual activity: Yes     Partners: Female     Other Topics Concern     Not on file     Social History Narrative        Family History:  Family History   Problem Relation Age of Onset     Breast Cancer Mother      Alzheimer Disease Father      Prostate Cancer Father 60     Thyroid Disease Sister      Breast  "Cancer Maternal Grandmother      Family history reviewed and not pertinent.    ROS:  The 10 point review of systems is negative other than noted in the HPI and above.    PE:  /76 (BP Location: Left arm, Cuff Size: Adult Large)  Pulse 83  Ht 6' 2\" (1.88 m)  Wt 239 lb (108.4 kg)  SpO2 97%  BMI 30.69 kg/m2  General appearance: well-developed, well-nourished, no apparent distress  HEENT:  Head normocephalic and atraumatic, pupils equal and round, conjunctivae clear, mucous membranes moist, external ears and nose normal  Lungs: respirations unlabored  Musculoskeletal:  Normal station and gait  Extremities: warm, without edema  Neurologic: alert, speech is clear, moves all extremities with good strength  Psychiatric: Mood and affect are appropriate  Skin: There is a 3 cm dermal cyst on/in the left distal anterior thigh with overlying punctum.  The overlying skin is otherwise normal.  It is non-tender.        This note was created using voice recognition software. Undetected word substitutions or other errors may have occurred.     Time spent with the patient with greater that 50% of the time in discussion was 10 minutes.     Milena English MD    Please route or send letter to:  Primary Care Provider (PCP) and Referring Provider      "

## 2017-11-06 NOTE — MR AVS SNAPSHOT
After Visit Summary   11/6/2017    Jaswinder Monroe    MRN: [id number]           Patient Information     Date Of Birth          1955        Visit Information        Provider Department      11/6/2017 1:45 PM Milena English MD [facility] General Surgery      Today's Diagnoses     Localized superficial swelling, mass, or lump    -  1       Follow-ups after your visit        Your next 10 appointments already scheduled     Nov 06, 2017  1:45 PM CST   CONSULT with Milena English MD   [facility] ([facility])    [facility] [address] [facility]  [facility] [id number] [phone]              Who to contact     If you have questions or need follow up information about today's clinic visit or your schedule please contact [facility] directly at [phone].  Normal or non-critical lab and imaging results will be communicated to you by MyChart, letter or phone within 4 business days after the clinic has received the results. If you do not hear from us within 7 days, please contact the clinic through Zoeticxhart or phone. If you have a critical or abnormal lab result, we will notify you by phone as soon as possible.  Submit refill requests through Pony Zero or call your pharmacy and they will forward the refill request to us. Please allow 3 business days for your refill to be completed.          Additional Information About Your Visit        MyChart Information     Pony Zero gives you secure access to your electronic health record. If you see a primary care provider, you can also send messages to your care team and make appointments. If you have questions, please call your primary care clinic.  If you do not have a primary care provider, please call [phone] and they will assist you.        Care EveryWhere ID     This is your Care EveryWhere ID. This could be used by other  "organizations to access your [place] medical records  [id number]        Your Vitals Were     Pulse Height Pulse Oximetry BMI (Body Mass Index)          83 6' 2\" (1.88 m) 97% 30.69 kg/m2         Blood Pressure from Last 3 Encounters:   11/06/17 128/76   11/01/17 108/78   09/29/17 120/78    Weight from Last 3 Encounters:   11/06/17 239 lb (108.4 kg)   11/01/17 239 lb (108.4 kg)   09/29/17 237 lb 14.4 oz (107.9 kg)              Today, you had the following     No orders found for display       Primary Care Provider Office Phone # Fax #    Antwan Ron [phone]       [address] [place] [phone]        Equal Access to Services     [facility] BABAR : Hadii angel luis anderson Soluoise, waaxda luqadaha, qaybta kaalmada yadira, kevin heath . So [facility] [phone].    ATENCIÓN: Si habla español, tiene a mckeon disposición servicios gratuitos de asistencia lingüística. Nehemias al [phone].    We comply with applicable federal civil rights laws and [place] laws. We do not discriminate on the basis of race, color, national origin, age, disability, sex, sexual orientation, or gender identity.            Thank you!     Thank you for choosing [facility]  for your care. Our goal is always to provide you with excellent care. Hearing back from our patients is one way we can continue to improve our services. Please take a few minutes to complete the written survey that you may receive in the mail after your visit with us. Thank you!             Your Updated Medication List - Protect others around you: Learn how to safely use, store and throw away your medicines at www.disposemymeds.org.          This list is accurate as of: 11/6/17  1:44 PM.  Always use your most recent med list.                   Brand Name Dispense Instructions for use Diagnosis    aspirin 81 MG EC tablet      Take 81 mg by mouth        cholecalciferol 5000 UNITS Caps capsule    " vitamin D3     Take 5,000 Units by mouth        Coconut Oil Oil           MULTI-VITAMINS Tabs

## 2017-11-27 ENCOUNTER — OFFICE VISIT (OUTPATIENT)
Dept: SURGERY | Facility: CLINIC | Age: 62
End: 2017-11-27
Payer: COMMERCIAL

## 2017-11-27 VITALS
BODY MASS INDEX: 30.67 KG/M2 | DIASTOLIC BLOOD PRESSURE: 80 MMHG | HEART RATE: 74 BPM | RESPIRATION RATE: 16 BRPM | OXYGEN SATURATION: 94 % | SYSTOLIC BLOOD PRESSURE: 132 MMHG | HEIGHT: 74 IN | WEIGHT: 239 LBS

## 2017-11-27 DIAGNOSIS — R22.9 LOCALIZED SUPERFICIAL SWELLING, MASS, OR LUMP: Primary | ICD-10-CM

## 2017-11-27 PROCEDURE — 99211 OFF/OP EST MAY X REQ PHY/QHP: CPT | Performed by: SURGERY

## 2017-11-27 NOTE — MR AVS SNAPSHOT
After Visit Summary   11/27/2017    Jaswinder Monroe    MRN: [id number]           Patient Information     Date Of Birth          1955        Visit Information        Provider Department      11/27/2017 1:15 PM Milena English MD [facility] [place] [facility] General Surgery      Today's Diagnoses     Localized superficial swelling, mass, or lump    -  1       Follow-ups after your visit        Your next 10 appointments already scheduled     Nov 28, 2017 11:30 AM [facility] with Milena English MD, aJyson Briseno PA-C   [facility] ([facility])    [facility])   [phone]            Dec 20, 2017   Procedure with Dyllan Heart MD   [facility] ([facility])    [address] [place] [id number] [phone]           [facility] is located at [address] [place]              Who to contact     If you have questions or need follow up information about today's clinic visit or your schedule please contact [facility] directly at [phone].  Normal or non-critical lab and imaging results will be communicated to you by MyChart, letter or phone within 4 business days after the clinic has received the results. If you do not hear from us within 7 days, please contact the clinic through MyChart or phone. If you have a critical or abnormal lab result, we will notify you by phone as soon as possible.  Submit refill requests through indidebt or call your pharmacy and they will forward the refill request to us. Please allow 3 business days for your refill to be completed.          Additional Information About Your Visit        Secure-NOKhart Information     indidebt gives you secure access to your electronic health record. If you see a primary care provider, you can  "also send messages to your care team and make appointments. If you have questions, please call your primary care clinic.  If you do not have a primary care provider, please call [phone] and they will assist you.        Care EveryWhere ID     This is your Care EveryWhere ID. This could be used by other organizations to access your [place] medical records  [id number]        Your Vitals Were     Pulse Respirations Height Pulse Oximetry BMI (Body Mass Index)       74 16 6' 2\" (1.88 m) 94% 30.69 kg/m2        Blood Pressure from Last 3 Encounters:   11/27/17 132/80   11/06/17 128/76   11/01/17 108/78    Weight from Last 3 Encounters:   11/27/17 239 lb (108.4 kg)   11/06/17 239 lb (108.4 kg)   11/01/17 239 lb (108.4 kg)              Today, you had the following     No orders found for display       Primary Care Provider Office Phone # Fax #    Antwan Ron [phone]       [address] [place] [phone]        Equal Access to Services     [facility] : Hadii angel luis pandeyo Solouise, waaxda luqadaha, qaybta kaalmada yadira, kevin heath . So [facility] [phone].    ATENCIÓN: Si habla español, tiene a mckeon disposición servicios gratuitos de asistencia lingüística. [facility] [phone].    We comply with applicable federal civil rights laws and [place] laws. We do not discriminate on the basis of race, color, national origin, age, disability, sex, sexual orientation, or gender identity.            Thank you!     Thank you for choosing [facility]  for your care. Our goal is always to provide you with excellent care. Hearing back from our patients is one way we can continue to improve our services. Please take a few minutes to complete the written survey that you may receive in the mail after your visit with us. Thank you!             Your Updated Medication List - Protect others around you: Learn how to safely use, store and throw " away your medicines at www.disposemymeds.org.          This list is accurate as of: 11/27/17  3:58 PM.  Always use your most recent med list.                   Brand Name Dispense Instructions for use Diagnosis    aspirin 81 MG EC tablet      Take 81 mg by mouth        cholecalciferol 5000 UNITS Caps capsule    vitamin D3     Take 5,000 Units by mouth        Coconut Oil Oil           MULTI-VITAMINS Tabs

## 2017-11-27 NOTE — LETTER
2017    Re: Jaswinder MARTINEZ Nilsabernardoemma - 1955    Left distal anterior thigh dermal cyst spontaneously drained last week.  It is now a fraction of the size and asymptomatic.     Exam:  Skin:  1 cm mass on the anterior distal left thigh with overlying punctum.  There is no erythema of the skin.  There is no active drainage.     A/P:  Jaswinder would like to continue observation.  I think this is appropriate.  He will call when and if the mass enlarges or become symptomatic.       Milena English MD

## 2018-02-16 ENCOUNTER — HOSPITAL ENCOUNTER (OUTPATIENT)
Facility: CLINIC | Age: 63
Discharge: HOME | End: 2018-02-16
Attending: INTERNAL MEDICINE | Admitting: INTERNAL MEDICINE
Payer: COMMERCIAL

## 2018-02-16 VITALS
DIASTOLIC BLOOD PRESSURE: 86 MMHG | BODY MASS INDEX: 30.54 KG/M2 | SYSTOLIC BLOOD PRESSURE: 116 MMHG | RESPIRATION RATE: 17 BRPM | HEIGHT: 74 IN | WEIGHT: 238 LBS | OXYGEN SATURATION: 96 %

## 2018-02-16 LAB — COLONOSCOPY: NORMAL

## 2018-02-16 PROCEDURE — 45378 DIAGNOSTIC COLONOSCOPY: CPT | Performed by: INTERNAL MEDICINE

## 2018-02-16 PROCEDURE — G0500 MOD SEDAT ENDO SERVICE >5YRS: HCPCS | Performed by: INTERNAL MEDICINE

## 2018-02-16 PROCEDURE — 25000128 H RX IP 250 OP 636: Performed by: INTERNAL MEDICINE

## 2018-02-16 PROCEDURE — G0121 COLON CA SCRN NOT HI RSK IND: HCPCS | Performed by: INTERNAL MEDICINE

## 2018-02-16 RX ORDER — ONDANSETRON 2 MG/ML
4 INJECTION INTRAMUSCULAR; INTRAVENOUS
Status: DISCONTINUED | OUTPATIENT
Start: 2018-02-16 | End: 2018-02-16 | Stop reason: HOSPADM

## 2018-02-16 RX ORDER — LIDOCAINE 40 MG/G
CREAM TOPICAL
Status: DISCONTINUED | OUTPATIENT
Start: 2018-02-16 | End: 2018-02-16 | Stop reason: HOSPADM

## 2018-02-16 RX ORDER — ONDANSETRON 2 MG/ML
4 INJECTION INTRAMUSCULAR; INTRAVENOUS EVERY 6 HOURS PRN
Status: DISCONTINUED | OUTPATIENT
Start: 2018-02-16 | End: 2018-02-16 | Stop reason: HOSPADM

## 2018-02-16 RX ORDER — FENTANYL CITRATE 50 UG/ML
INJECTION, SOLUTION INTRAMUSCULAR; INTRAVENOUS PRN
Status: DISCONTINUED | OUTPATIENT
Start: 2018-02-16 | End: 2018-02-16 | Stop reason: HOSPADM

## 2018-02-16 RX ORDER — ONDANSETRON 4 MG/1
4 TABLET, ORALLY DISINTEGRATING ORAL EVERY 6 HOURS PRN
Status: DISCONTINUED | OUTPATIENT
Start: 2018-02-16 | End: 2018-02-16 | Stop reason: HOSPADM

## 2018-02-16 RX ORDER — FLUMAZENIL 0.1 MG/ML
0.2 INJECTION, SOLUTION INTRAVENOUS
Status: DISCONTINUED | OUTPATIENT
Start: 2018-02-16 | End: 2018-02-16 | Stop reason: HOSPADM

## 2018-02-16 RX ORDER — NALOXONE HYDROCHLORIDE 0.4 MG/ML
.1-.4 INJECTION, SOLUTION INTRAMUSCULAR; INTRAVENOUS; SUBCUTANEOUS
Status: DISCONTINUED | OUTPATIENT
Start: 2018-02-16 | End: 2018-02-16 | Stop reason: HOSPADM

## 2018-02-16 NOTE — LETTER
December 1, 2017      Jaswinder Monroe  [address] [place] [address] [place] [address]        Dear Jaswinder,       Thank you for choosing [facility]. You are scheduled for the following service.     Date:  12-20-17             Procedure:  COLONOSCOPY  Doctor:        Sly   Arrival Time:  0845  *check in at Emergency/Endoscopy desk*  Procedure Time:  0915    Location:   [facility]        Endoscopy Department, First Floor (Enter through ER Doors) *        [address] [place], [place] [address]      [phone] or [phone] () to reschedule      MIRALAX -GATORADE  PREP  Colonoscopy is the most accurate test to detect colon polyps and colon cancer; and the only test where polyps can be removed. During this procedure, a doctor examines the lining of your large intestine and rectum through a flexible tube.           Transportation  Arrange for a ride for the day of your procedure with a responsible adult.  A taxi ride is not an option unless you are accompanied by a responsible adult. If you fail to arrange transportation with a responsible adult, your procedure will be cancelled and rescheduled.    Purchase the  following supplies at your local pharmacy:  - 2 (two) bisacodyl tablets: each tablet contains 5 mg.  (Dulcolax  laxative NOT Dulcolax  stool softener)   - 1 (one) 8.3 oz bottle of Polyethylene Glycol (PEG) 3350 Powder   (MiraLAX , Smooth LAX , ClearLAX  or equivalent)  - 64 oz Gatorade    Regular Gatorade, Gatorade G2 , Powerade , Powerade Zero  or Pedialyte  is acceptable. Red colored flavors are not allowed; all other colors (yellow, green, orange, purple and blue) are okay. It is also okay to buy two 2.12 oz packets of powdered Gatorade that can be mixed with water to a total volume of 64 oz of liquid.  - 1 (one) 10 oz bottle of Magnesium Citrate (Red colored flavors are not allowed)  It is also okay for you to use a 0.5 oz package of powdered magnesium  citrate (17 g) mixed with 10 oz of water.    PREPARATION FOR COLONOSCOPY    7 days before:    Discontinue fiber supplements and medications containing iron. This includes Metamucil  and Fibercon ; and multivitamins with iron.  3 days before:    Begin a low-fiber diet. A low-fiber diet helps making the cleanout more effective.     Examples of a low-fiber diet include (but are not limited to): white bread, white rice, pasta, crackers, fish, chicken, eggs, ground beef, creamy peanut butter, cooked/steamed/boiled vegetables, canned fruit, bananas, melons, milk, plain yogurt cheese, salad dressing and other condiments.     The following are not allowed on a low-fiber diet: seeds, nuts, popcorn, bran, whole wheat, corn, quinoa, raw fruits and vegetables, berries and dried fruit, beans and lentils.    For additional details on low-fiber diet, please refer to the table on the last page.  2 days before:    Continue the low-fiber diet.     Drink at least 8 glasses of water throughout the day.     Stop eating solid foods at 11:45 pm.  1 day before:    In the morning: begin a clear liquid diet (liquids you can see through).     Examples of a clear liquid diet include: water, clear broth or bouillon, Gatorade, Pedialyte or Powerade, carbonated and non-carbonated soft drinks (Sprite , 7-Up , ginger ale), strained fruit juices without pulp (apple, white grape, white cranberry), Jell-O  and popsicles.     The following are not allowed on a clear liquid diet: red liquids, alcoholic beverages, coffee, dairy products (milk, creamer, and yogurt), protein shakes, creamy broths, juice with pulp and chewing tobacco.    At noon: take 2 (two) bisacodyl tablets     At 4 (and no later than 6pm): start drinking the Miralax-Gatorade preparation (8.3 oz of Miralax mixed with 64 oz of Gatorade in a large pitcher). Drink 1(one) 8 oz glass every 15 minutes thereafter, until the mixture is gone.    COLON CLEANSING TIPS: drink adequate amounts of  fluids before and after your colon cleansing to prevent dehydration. Stay near a toilet because you will have diarrhea. Even if you are sitting on the toilet, continue to drink the cleansing solution every 15 minutes. If you feel nauseous or vomit, rinse your mouth with water, take a 15 to 30-minute-break and then continue drinking the solution. You will be uncomfortable until the stool has flushed from your colon (in about 2 to 4 hours). You may feel chilled.              Day of your procedure  You may take all of your morning medications including blood pressure medications, blood thinners (if you have not been instructed to stop these by our office), methadone, anti-seizure medications with sips of water 3 hours prior to your procedure or earlier. Do not take insulin or vitamins prior to your procedure. Continue the clear liquid diet.   4 hours prior: drink 10 oz of magnesium citrate. It may be easier to drink it with a straw.    STOP consuming all liquids after that.     Do not take anything by mouth during this time.     Allow extra time to travel to your procedure as you may need to stop and use a restroom along the way.  You are ready for the procedure, if you followed all instructions and your stool is no longer formed, but clear or yellow liquid. If you are unsure whether your colon is clean, please call our office at [phone] before you leave for your appointment.  Bring the following to your procedure:  - Insurance Card/Photo ID.   - List of current medications including over-the-counter medications and supplements.   - Your rescue inhaler if you currently use one to control asthma.      Canceling or rescheduling your appointment:   If you must cancel or reschedule your appointment, please call [phone] as soon as possible.      COLONOSCOPY PRE-PROCEDURE CHECKLIST  If you have diabetes, ask your regular doctor for diet and medication restrictions.  If you take an anticoagulant or anti-platelet  medication (such as Coumadin , Lovenox , Pradaxa , Xarelto , Eliquis , etc.), please call your primary doctor for advice on holding this medication.  If you take aspirin you may continue to do so.  If you are or may be pregnant, please discuss the risks and benefits of this procedure with your doctor.          What happens during a colonoscopy?    Plan to spend up to two hours, starting at registration time, at the endoscopy center the day of your procedure. The colonoscopy takes an average of 15 to 30 minutes. Recovery time is about 30 minutes.    Before the exam:    You will change into a gown.    Your medical history and medication list will be reviewed with you, unless that has been done over the phone prior to the procedure.     A nurse will insert an intravenous (IV) line into your hand or arm.    The doctor will meet with you and will give you a consent form to sign.    During the exam:     Medicine will be given through the IV line to help you relax.     Your heart rate and oxygen levels will be monitored. If your blood pressure is low, you may be given fluids through the IV line.     The doctor will insert a flexible hollow tube, called a colonoscope, into your rectum. The scope will be advanced slowly through the large intestine (colon).    You may have a feeling of fullness or pressure.     If an abnormal tissue or a polyp is found, the doctor may remove it through the endoscope for closer examination, or biopsy. Tissue removal is painless    After the exam:           Any tissue samples removed during the exam will be sent to a lab for evaluation. It may take 5-7 working days for you to be notified of the results.     A nurse will provide you with complete discharge instructions before you leave the endoscopy center. Be sure to ask the nurse for specific instructions if you take blood thinners such as Aspirin, Coumadin or Plavix.     The doctor will prepare a full report for you and for the physician who  referred you for the procedure.     Your doctor will talk with you about the initial results of your exam.      Medication given during the exam will prohibit you from driving for the rest of the day.     Following the exam, you may resume your normal diet. Your first meal should be light, no greasy foods. Avoid alcohol until the next day.     You may resume your regular activities the day after the procedure.     LOW-FIBER DIET    Foods RECOMMENDED Foods to AVOID   Breads, Cereal, Rice and Pasta:   White bread, rolls, biscuits, croissant and luanne toast.   Waffles, [place] toast and pancakes.   White rice, noodles, pasta, macaroni and peeled cooked potatoes.   Plain crackers and saltines.   Cooked cereals: farina, cream of rice.   Cold cereals: Puffed Rice , Rice Krispies , Corn Flakes  and Special K    Breads, Cereal, Rice and Pasta:   Breads or rolls with nuts, seeds or fruit.   Whole wheat, pumpernickel, rye breads and cornbread.   Potatoes with skin, brown or wild rice, and kasha (buckwheat).     Vegetables:   Tender cooked and canned vegetables without seeds: carrots, asparagus tips, green or wax beans, pumpkin, spinach, lima beans. Vegetables:   Raw or steamed vegetables.   Vegetables with seeds.   Sauerkraut.   Winter squash, peas, broccoli, Brussel sprouts, cabbage, onions, cauliflower, baked beans, peas and corn.   Fruits:   Strained fruit juice.   Canned fruit, except pineapple.   Ripe bananas and melon. Fruits:   Prunes and prune juice.   Raw fruits.   Dried fruits: figs, dates and raisins.   Milk/Dairy:   Milk: plain or flavored.   Yogurt, custard and ice cream.   Cheese and cottage cheese Milk/Dairy:     Meat and other proteins:   ground, well-cooked tender beef, lamb, ham, veal, pork, fish, poultry and organ meats.   Eggs.   Peanut butter without nuts. Meat and other proteins:   Tough, fibrous meats with gristle.   Dry beans, peas and lentils.   Peanut butter with nuts.   Tofu.   Fats, Snack, Sweets,  Condiments and Beverages:   Margarine, butter, oils, mayonnaise, sour cream and salad dressing, plain gravy.   Sugar, hard candy, clear jelly, honey and syrup.   Spices, cooked herbs, bouillon, broth and soups made with allowed vegetable, ketchup and mustard.   Coffee, tea and carbonated drinks.   Plain cakes, cookies and pretzels.   Gelatin, plain puddings, custard, ice cream, sherbet and popsicles. Fats, Snack, Sweets, Condiments and Beverages:   Nuts, seeds and coconut.   Jam, marmalade and preserves.   Pickles, olives, relish and horseradish.   All desserts containing nuts, seeds, dried fruit and coconut; or made from whole grains or bran.   Candy made with nuts or seeds.   Popcorn.                     DIRECTIONS TO THE ENDOSCOPY DEPARTMENT     From the north ([facility])  Take [address] South, exit on [address] [address]. Get into the left hand shannon, turn left (east), go one-half mile to [address] and turn left. Go north to the first stoplight, take a right on [address] and follow it to the Emergency entrance.    From the south ([facility])  Take 35N to the 35E split and exit on [address] [address]. On [address] [address], turn left (west) to [address]. Turn right (north) on [address]. Go north to the first stoplight, take a right on [address] and follow it to the Emergency entrance.    From the east via 35E ([facility])  Take [address]E south to [address] [address] exit. Turn right on [address] [address]. Go west to [address]. Turn right (north) on [address]. Go to the first stoplight, take a right and follow on [address] to the Emergency entrance.    From the east via [address] ([facility])  Take [address] to [address]. Turn left (south) on [address] to [address]. Turn left (east) on [address] and follow it to the Emergency entrance.    From the west via [address] ([place])  Take [address] east to [address].  Turn right (south) on [address] to [address]. Turn left (east) on [address] and follow it to the Emergency entrance.

## 2018-02-16 NOTE — H&P
"Pre-Endoscopy History and Physical     Jaswinder Monroe MRN# [id number]   YOB: 1955 Age: 62 year old     Date of Procedure: 2/16/2018  Primary care provider: Antwan Ron  Type of Endoscopy: colonoscopy  Reason for Procedure: screening  Type of Anesthesia Anticipated: Conscious Sedation    HPI:    Jaswinder is a 62 year old male who will be undergoing the above procedure.      A history and physical has been performed. The patient's medications and allergies have been reviewed. The risks and benefits of the procedure and the sedation options and risks were discussed with the patient.  All questions were answered and informed consent was obtained.      He denies a personal or family history of anesthesia complications or bleeding disorders.     Patient Active Problem List   Diagnosis     Mixed hyperlipidemia- 10 yr CV risk 8.1%     Elevated prostate specific antigen (PSA)        Past Medical History:   Diagnosis Date     Fibula fracture     Left        Past Surgical History:   Procedure Laterality Date     VASECTOMY         Relevant Family History: NONE    Relevant Social History: NONE     Prior to Admission medications    Medication Sig Start Date End Date Taking? Authorizing Provider   Multiple Vitamin (MULTI-VITAMINS) TABS  5/24/17   Reported, Patient   cholecalciferol (VITAMIN D3) 5000 UNITS CAPS capsule Take 5,000 Units by mouth 5/24/17   Reported, Patient   aspirin 81 MG EC tablet Take 81 mg by mouth 5/24/17   Reported, Patient   Coconut Oil OIL  5/24/17   Reported, Patient       No Known Allergies     REVIEW OF SYSTEMS:   A relevant review of systems was performed and was negative    PHYSICAL EXAM:   /81  Resp 10  Ht 1.88 m (6' 2\")  Wt 108 kg (238 lb)  SpO2 96%  BMI 30.56 kg/m2 Estimated body mass index is 30.56 kg/(m^2) as calculated from the following:    Height as of this encounter: 1.88 m (6' 2\").    Weight as of this encounter: 108 kg (238 lb).   GENERAL APPEARANCE: alert, and " oriented  MENTAL STATUS: alert  AIRWAY EXAM: Normal  RESP: lungs clear to auscultation - no rales, rhonchi or wheezes  CV: regular rates and rhythm  DIAGNOSTICS:    Not indicated    IMPRESSION   ASA Class 2 - Mild systemic disease    PLAN:   Plan for colonoscopy. We discussed the risks, benefits and alternatives and the patient wished to proceed.      Signed Electronically by: Александр Madrid  February 16, 2018

## 2018-06-28 ENCOUNTER — OFFICE VISIT (OUTPATIENT)
Dept: INTERNAL MEDICINE | Facility: CLINIC | Age: 63
End: 2018-06-28
Payer: COMMERCIAL

## 2018-06-28 ENCOUNTER — NURSE TRIAGE (OUTPATIENT)
Dept: NURSING | Facility: CLINIC | Age: 63
End: 2018-06-28

## 2018-06-28 VITALS
WEIGHT: 240.6 LBS | TEMPERATURE: 98.1 F | SYSTOLIC BLOOD PRESSURE: 112 MMHG | HEIGHT: 74 IN | BODY MASS INDEX: 30.88 KG/M2 | HEART RATE: 71 BPM | DIASTOLIC BLOOD PRESSURE: 70 MMHG | RESPIRATION RATE: 15 BRPM | OXYGEN SATURATION: 95 %

## 2018-06-28 DIAGNOSIS — M79.89 SWELLING OF ARM: Primary | ICD-10-CM

## 2018-06-28 PROCEDURE — 99214 OFFICE O/P EST MOD 30 MIN: CPT | Performed by: INTERNAL MEDICINE

## 2018-06-28 RX ORDER — CEPHALEXIN 500 MG/1
500 CAPSULE ORAL 3 TIMES DAILY
Qty: 21 CAPSULE | Refills: 0 | Status: SHIPPED | OUTPATIENT
Start: 2018-06-28 | End: 2018-07-10

## 2018-06-28 NOTE — MR AVS SNAPSHOT
After Visit Summary   6/28/2018    Jaswinder Monroe    MRN: [id number]           Patient Information     Date Of Birth          1955        Visit Information        Provider Department      6/28/2018 11:00 AM Nile Huertas MD [facility]        Today's Diagnoses     Swelling of arm    -  1       Follow-ups after your visit        Follow-up notes from your care team     Return if symptoms worsen or fail to improve.      Future tests that were ordered for you today     Open Future Orders        Priority Expected Expires Ordered    US Extremity Upper Venous  lt Routine  6/28/2019 6/28/2018            Who to contact     If you have questions or need follow up information about today's clinic visit or your schedule please contact [facility] directly at [phone].  Normal or non-critical lab and imaging results will be communicated to you by MyChart, letter or phone within 4 business days after the clinic has received the results. If you do not hear from us within 7 days, please contact the clinic through MyChart or phone. If you have a critical or abnormal lab result, we will notify you by phone as soon as possible.  Submit refill requests through [employer] or call your pharmacy and they will forward the refill request to us. Please allow 3 business days for your refill to be completed.          Additional Information About Your Visit        MyChart Information     [employer] gives you secure access to your electronic health record. If you see a primary care provider, you can also send messages to your care team and make appointments. If you have questions, please call your primary care clinic.  If you do not have a primary care provider, please call [phone] and they will assist you.        Care EveryWhere ID     This is your Care EveryWhere ID. This could be used by other organizations to access your [place] medical records  [id number]       "  Your Vitals Were     Pulse Temperature Respirations Height Pulse Oximetry BMI (Body Mass Index)    71 98.1  F (36.7  C) (Oral) 15 6' 2\" (1.88 m) 95% 30.89 kg/m2       Blood Pressure from Last 3 Encounters:   06/28/18 112/70   02/16/18 116/86   11/27/17 132/80    Weight from Last 3 Encounters:   06/28/18 240 lb 9.6 oz (109.1 kg)   02/16/18 238 lb (108 kg)   11/27/17 239 lb (108.4 kg)                 Today's Medication Changes          These changes are accurate as of 6/28/18 11:18 AM.  If you have any questions, ask your nurse or doctor.               Start taking these medicines.        Dose/Directions    cephALEXin 500 MG capsule   Commonly known as:  KEFLEX   Used for:  Swelling of arm   Started by:  Nile Huertas MD        Dose:  500 mg   Take 1 capsule (500 mg) by mouth 3 times daily   Quantity:  21 capsule   Refills:  0            Where to get your medicines      These medications were sent to [facility] [address] [address]     Phone:  [phone]     cephALEXin 500 MG capsule                Primary Care Provider Office Phone # Fax #    Antwan Ron [phone]       [address] [place] [id number]        Equal Access to Services     DENISE ECKERT AH: Hadii angel luis valdez hadasho Sofreddyali, waaxda luqadaha, qaybta kaalmada adeegyada, kevin heath . So [facility] [phone].    ATENCIÓN: Si habla español, tiene a mckeon disposición servicios gratuitos de asistencia lingüística. Llame al [phone].    We comply with applicable federal civil rights laws and [place] laws. We do not discriminate on the basis of race, color, national origin, age, disability, sex, sexual orientation, or gender identity.            Thank you!     Thank you for choosing [facility]  for your care. Our goal is always to provide you with excellent care. Hearing back from our patients is one " way we can continue to improve our services. Please take a few minutes to complete the written survey that you may receive in the mail after your visit with us. Thank you!             Your Updated Medication List - Protect others around you: Learn how to safely use, store and throw away your medicines at www.disposemymeds.org.          This list is accurate as of 6/28/18 11:18 AM.  Always use your most recent med list.                   Brand Name Dispense Instructions for use Diagnosis    aspirin 81 MG EC tablet      Take 81 mg by mouth        cephALEXin 500 MG capsule    KEFLEX    21 capsule    Take 1 capsule (500 mg) by mouth 3 times daily    Swelling of arm       cholecalciferol 5000 units Caps capsule    vitamin D3     Take 5,000 Units by mouth        Coconut Oil Oil           MULTI-VITAMINS Tabs

## 2018-06-28 NOTE — TELEPHONE ENCOUNTER
"  Reason for Disposition    Entire arm is swollen    Additional Information    Negative: Shock suspected (e.g., cold/pale/clammy skin, too weak to stand, low BP, rapid pulse)    Negative: Sounds like a life-threatening emergency to the triager    Negative: Shock suspected (e.g., cold/pale/clammy skin, too weak to stand, low BP, rapid pulse)    Negative: [1] Similar pain previously AND [2] it was from \"heart attack\"    Negative: [1] Similar pain previously AND [2] it was from \"angina\" AND [3] not relieved by nitroglycerin    Negative: Sounds like a life-threatening emergency to the triager    Negative: Followed an arm injury    Negative: Chest pain    Negative: Pain, redness, or swelling at intravenous (IV) site or along course of vein    Negative: Wound looks infected    Negative: Elbow pain is main symptom    Negative: Wrist pain is main symptom    Negative: Difficulty breathing or unusual sweating (e.g., sweating without exertion)    Negative: [1] Age > 40 AND [2] associated chest or jaw pain AND [3] pain lasts > 5 minutes    Negative: [1] Age > 40 AND [2] no obvious cause AND [3] pain even when not moving the arm    (Exception: pain is clearly made worse by moving arm or bending neck)    Negative: [1] SEVERE pain AND [2] not improved 2 hours after pain medicine    Negative: [1] Red area or streak AND [2] fever    Negative: [1] Swollen joint AND [2] fever    Negative: Patient sounds very sick or weak to the triager    Negative: [1] Red area or streak AND [2] large (> 2 in. or 5 cm)    Protocols used: ARM PAIN-ADULT-, BRUISES-ADULT-AH    "

## 2018-06-28 NOTE — PROGRESS NOTES
SUBJECTIVE:   Jaswinder Monroe is a 62 year old male who presents to clinic today for the following health issues:    Patient normally seen by Dr. Asaf Moe      Duration: Noticed Sunday initially     Description  Location: Left forearm   Itching: no    Intensity:  mild    Accompanying signs and symptoms: bruising, tenderness, numbness     History (similar episodes/previous evaluation): None    Precipitating or alleviating factors:  New exposures:  None  Recent travel: no      Therapies tried and outcome: none      Problem list and histories reviewed & adjusted, as indicated.  Additional history: as documented    Patient Active Problem List   Diagnosis     Mixed hyperlipidemia- 10 yr CV risk 8.1%     Elevated prostate specific antigen (PSA)     Past Surgical History:   Procedure Laterality Date     COLONOSCOPY N/A 2/16/2018    Procedure: COLONOSCOPY;  colonoscopy;  Surgeon: Александр Madrid MD;  Location: [facility]     VASECTOMY         Social History   Substance Use Topics     Smoking status: Never Smoker     Smokeless tobacco: Never Used     Alcohol use 2.4 oz/week     4 Standard drinks or equivalent per week      Comment: social     Family History   Problem Relation Age of Onset     Breast Cancer Mother      Alzheimer Disease Father      Prostate Cancer Father 60     Thyroid Disease Sister      Breast Cancer Maternal Grandmother      Colon Cancer No family hx of          Current Outpatient Prescriptions   Medication Sig Dispense Refill     aspirin 81 MG EC tablet Take 81 mg by mouth       cholecalciferol (VITAMIN D3) 5000 UNITS CAPS capsule Take 5,000 Units by mouth       Coconut Oil OIL        Multiple Vitamin (MULTI-VITAMINS) TABS        No Known Allergies  BP Readings from Last 3 Encounters:   02/16/18 116/86   11/27/17 132/80   11/06/17 128/76    Wt Readings from Last 3 Encounters:   02/16/18 238 lb (108 kg)   11/27/17 239 lb (108.4 kg)   11/06/17 239 lb (108.4 kg)                    Reviewed and updated as  "needed this visit by clinical staff  Tobacco  Allergies  Meds  Med Hx  Surg Hx  Fam Hx  Soc Hx      Reviewed and updated as needed this visit by Provider         ROS:  CONSTITUTIONAL: NEGATIVE for fever, chills, change in weight  ENT/MOUTH: NEGATIVE for ear, mouth and throat problems  RESP: NEGATIVE for significant cough or SOB  CV: NEGATIVE for chest pain, palpitations or peripheral edema  GI: NEGATIVE for nausea, abdominal pain, heartburn, or change in bowel habits  : NEGATIVE for frequency, dysuria, or hematuria  MUSCULOSKELETAL: NEGATIVE for significant arthralgias or myalgia  NEURO: NEGATIVE for weakness, dizziness or paresthesias  HEME: NEGATIVE for bleeding problems  PSYCHIATRIC: NEGATIVE for changes in mood or affect    OBJECTIVE:                                                    /70  Pulse 71  Temp 98.1  F (36.7  C) (Oral)  Resp 15  Ht 6' 2\" (1.88 m)  Wt 240 lb 9.6 oz (109.1 kg)  SpO2 95%  BMI 30.89 kg/m2  Body mass index is 30.89 kg/(m^2).  GENERAL: alert and no distress  EYES: Eyes grossly normal to inspection, extraocular movements - intact, and PERRL  HENT: ear canals- normal; TMs- normal; Nose- normal; Mouth- no ulcers, no lesions  NECK: no tenderness, no adenopathy, no asymmetry, no masses, no stiffness; thyroid- normal to palpation  RESP: lungs clear to auscultation - no rales, no rhonchi, no wheezes  CV: regular rates and rhythm, normal S1 S2, no S3 or S4 and no click or rub   MS: extremities- no gross deformities noted  SKIN: The left upper extremity demonstrates fairly significant deep tissue ecchymosis noted on the undersurface of the left forearm extending to the elbow and to the mid lateral forearm.  There is some soft tissue swelling noted in this area.  There is no distinct insect bite or skin tear or abrasion noted.  On the lateral outer surface of the forearm there is an area of erythema and warmth that measures roughly 4 cm x 8 cm in diameter and is elliptical in " shape.  NEURO: no focal changes  PSYCH: Alert and oriented times 3; speech- coherent , normal rate and volume; able to articulate logical thoughts, able to abstract reason, no tangential thoughts, no hallucinations or delusions, affect- normal     ASSESSMENT/PLAN:                                                      (M79.89) Swelling of arm  (primary encounter diagnosis)  Comment: It is unclear whether not this represents soft tissue trauma, spontaneous muscle injury and/or potential insect bite but there is mild erythema associated with swelling and diffuse ecchymosis.  I have suggested empiric Keflex 3 times daily for 7 days and a venous ultrasound of the left upper extremity to assess potentially for clot formation.  It is more likely that this represents potentially a superficial clot rather than a deep clot as his symptoms have been present for a week and the entirety of the remainder of the arm is not of significance.  Plan: cephALEXin (KEFLEX) 500 MG capsule, US         Extremity Upper Venous  lt        Arm elevation and warm soaks were all discussed    See Patient Instructions    Nile Huertas MD  [facility]    THE MEDICATION LIST HAS BEEN FULLY RECONCILED BY THE [facility] AND THE NURSING STAFF.    25 minutes spent with this patient, face to face, discussing treatment options for listed problems above as well as side effects of appropriate medications.  Counseling time extended beyond 50% of the clinic visit.  Medication dosing, treatment plan and follow-up were discussed. Also reviewed need for primary care testing for patient.

## 2018-06-28 NOTE — TELEPHONE ENCOUNTER
"Patient calling, reporting symptoms starting Sunday 6/24/18 with left forearm swelling. Patient denies any known injury.  Reporting today arm is  \"all purple.\" Elbow to 3/4 down forearm with  \"bruising.\" Reporting arm is mildly swollen. Forearm feels slightly numb. Reporting full movement of fingers. Denies other symptoms.    Per guidelines advised to be seen with in 4 hours. Patient has appointment at 11 a.m. Today. Caller verbalized understanding. Denies further questions.    Sarika Kohler RN  [facility]      "

## 2018-06-29 ENCOUNTER — HOSPITAL ENCOUNTER (OUTPATIENT)
Dept: ULTRASOUND IMAGING | Facility: CLINIC | Age: 63
Discharge: HOME | End: 2018-06-29
Attending: INTERNAL MEDICINE | Admitting: INTERNAL MEDICINE
Payer: COMMERCIAL

## 2018-06-29 DIAGNOSIS — M79.89 SWELLING OF ARM: ICD-10-CM

## 2018-06-29 PROCEDURE — 93971 EXTREMITY STUDY: CPT | Mod: LT

## 2018-07-10 ENCOUNTER — OFFICE VISIT (OUTPATIENT)
Dept: INTERNAL MEDICINE | Facility: CLINIC | Age: 63
End: 2018-07-10
Payer: COMMERCIAL

## 2018-07-10 VITALS
BODY MASS INDEX: 30.72 KG/M2 | OXYGEN SATURATION: 97 % | RESPIRATION RATE: 14 BRPM | TEMPERATURE: 97.7 F | DIASTOLIC BLOOD PRESSURE: 74 MMHG | HEART RATE: 69 BPM | SYSTOLIC BLOOD PRESSURE: 122 MMHG | WEIGHT: 239.3 LBS

## 2018-07-10 DIAGNOSIS — M71.122 SEPTIC OLECRANON BURSITIS OF LEFT ELBOW: Primary | ICD-10-CM

## 2018-07-10 PROCEDURE — 99213 OFFICE O/P EST LOW 20 MIN: CPT | Performed by: INTERNAL MEDICINE

## 2018-07-10 RX ORDER — CLINDAMYCIN HCL 300 MG
300 CAPSULE ORAL 4 TIMES DAILY
Qty: 40 CAPSULE | Refills: 0 | Status: SHIPPED | OUTPATIENT
Start: 2018-07-10 | End: 2018-07-20

## 2018-07-10 NOTE — MR AVS SNAPSHOT
"              After Visit Summary   7/10/2018    Jaswinder Monroe    MRN: [id number]           Patient Information     Date Of Birth          1955        Visit Information        Provider Department      7/10/2018 3:20 PM Nico Canas MD [facility]        Today's Diagnoses     Septic olecranon bursitis of left elbow    -  1      Care Instructions    *  Infected elbow bursa.     *  Clindamycin 300 mg four times per day for 10 days.     *  Consider an over the counter \"probiotic\" like Align or CUltuerlle.      *  If you develop any severe diarrhea in the next month from the antibiotics, let us know.      *  Follow up with orthopedic surgeon at [facility] [phone]   [email or web address] in 1-2 days for re-evalaution.  You may need some fluid removed.     *  If there is any worsening of the swelling, pain, etc, even if you are taking the antibiotics, then go to the [facility] ER or [facility] Emergency Room.                Follow-ups after your visit        Additional Services     ORTHOPEDICS ADULT REFERRAL       Your provider has referred you to:     [facility] [phone]   [email or web address]    Please be aware that coverage of these services is subject to the terms and limitations of your health insurance plan.  Call member services at your health plan with any benefit or coverage questions.      Please bring the following to your appointment:    >>   Any x-rays, CTs or MRIs which have been performed.  Contact the facility where they were done to arrange for  prior to your scheduled appointment.    >>   List of current medications   >>   This referral request   >>   Any documents/labs given to you for this referral                  Who to contact     If you have questions or need follow up information about today's clinic visit or your " schedule please contact [facility] directly at [phone].  Normal or non-critical lab and imaging results will be communicated to you by MyChart, letter or phone within 4 business days after the clinic has received the results. If you do not hear from us within 7 days, please contact the clinic through [employer] or phone. If you have a critical or abnormal lab result, we will notify you by phone as soon as possible.  Submit refill requests through [employer] or call your pharmacy and they will forward the refill request to us. Please allow 3 business days for your refill to be completed.          Additional Information About Your Visit        [employer] Information     [employer] gives you secure access to your electronic health record. If you see a primary care provider, you can also send messages to your care team and make appointments. If you have questions, please call your primary care clinic.  If you do not have a primary care provider, please call [phone] and they will assist you.        Care EveryWhere ID     This is your Care EveryWhere ID. This could be used by other organizations to access your [place] medical records  [id number]        Your Vitals Were     Pulse Temperature Respirations Pulse Oximetry BMI (Body Mass Index)       69 97.7  F (36.5  C) (Oral) 14 97% 30.72 kg/m2        Blood Pressure from Last 3 Encounters:   07/10/18 122/74   06/28/18 112/70   02/16/18 116/86    Weight from Last 3 Encounters:   07/10/18 239 lb 4.8 oz (108.5 kg)   06/28/18 240 lb 9.6 oz (109.1 kg)   02/16/18 238 lb (108 kg)              We Performed the Following     ORTHOPEDICS ADULT REFERRAL          Today's Medication Changes          These changes are accurate as of 7/10/18  4:12 PM.  If you have any questions, ask your nurse or doctor.               Start taking these medicines.        Dose/Directions    clindamycin 300 MG capsule   Commonly known as:  CLEOCIN   Used for:  Septic olecranon  bursitis of left elbow   Started by:  Nico Canas MD        Dose:  300 mg   Take 1 capsule (300 mg) by mouth 4 times daily for 10 days   Quantity:  40 capsule   Refills:  0            Where to get your medicines      These medications were sent to [facility] [address] [facility] - [place], [place] - [address]  [address], [facility] [address]     Phone:  [phone]     clindamycin 300 MG capsule                Primary Care Provider Office Phone # Fax #    Antwan Ron [phone]       [address] [facility] [address]        Equal Access to Services     [facility]: Hadii aad ku hadasho Soomaali, waaxda luqadaha, qaybta kaalmada adeegyada, kevin heath . So [facility] [phone].    ATENCIÓN: Si habla español, tiene a mckeon disposición servicios gratuitos de asistencia lingüística. [facility] [phone].    We comply with applicable federal civil rights laws and [place] laws. We do not discriminate on the basis of race, color, national origin, age, disability, sex, sexual orientation, or gender identity.            Thank you!     Thank you for choosing [facility]  for your care. Our goal is always to provide you with excellent care. Hearing back from our patients is one way we can continue to improve our services. Please take a few minutes to complete the written survey that you may receive in the mail after your visit with us. Thank you!             Your Updated Medication List - Protect others around you: Learn how to safely use, store and throw away your medicines at www.disposemymeds.org.          This list is accurate as of 7/10/18  4:12 PM.  Always use your most recent med list.                   Brand Name Dispense Instructions for use Diagnosis    aspirin 81 MG EC tablet      Take 81 mg by mouth        cholecalciferol 5000 units Caps capsule    vitamin D3     Take 5,000 Units by mouth        clindamycin  300 MG capsule    CLEOCIN    40 capsule    Take 1 capsule (300 mg) by mouth 4 times daily for 10 days    Septic olecranon bursitis of left elbow       Coconut Oil Oil           MULTI-VITAMINS Tabs           TURMERIC PO

## 2018-07-10 NOTE — PROGRESS NOTES
SUBJECTIVE:   Jaswinder Monroe is a 62 year old male who presents to clinic today for the following health issues:      Edema      Duration: since 6/20    Description (location/character/radiation): on L fore arm. Was seen on 6/28 and was given abx. Worsening over the last 24 hours.      Intensity:  moderate    Accompanying signs and symptoms: swelling, pain,  warmth and buising    History (similar episodes/previous evaluation): seen on 6/28    Precipitating or alleviating factors: initially did not see any bug bite signs but noticed one one the inside of elbow    Therapies tried and outcome: keflex with some relief but now sx are worsening again           Problem list and histories reviewed & adjusted, as indicated.  Additional history: as documented        Reviewed and updated as needed this visit by clinical staff  Tobacco  Allergies  Meds       Reviewed and updated as needed this visit by Provider           Past Medical History:  ---------------------------  Past Medical History:   Diagnosis Date     Fibula fracture     Left       Past Surgical History:  ---------------------------  Past Surgical History:   Procedure Laterality Date     COLONOSCOPY N/A 2/16/2018    Procedure: COLONOSCOPY;  colonoscopy;  Surgeon: Александр Madrid MD;  Location: [facility]     VASECTOMY         Current Medications:  ---------------------------  Current Outpatient Prescriptions   Medication Sig Dispense Refill     aspirin 81 MG EC tablet Take 81 mg by mouth       cholecalciferol (VITAMIN D3) 5000 UNITS CAPS capsule Take 5,000 Units by mouth       Coconut Oil OIL        Multiple Vitamin (MULTI-VITAMINS) TABS        TURMERIC PO          Allergies:  -------------  No Known Allergies    Social History:  -------------------  Social History     Social History     Marital status:      Spouse name: N/A     Number of children: 4     Years of education: N/A     Occupational History      [employer]     Social History Main Topics     Smoking  status: Never Smoker     Smokeless tobacco: Never Used     Alcohol use 2.4 oz/week     4 Standard drinks or equivalent per week      Comment: social     Drug use: No     Sexual activity: Yes     Partners: Female     Other Topics Concern     Not on file     Social History Narrative       Family Medical History:  ------------------------------  Family History   Problem Relation Age of Onset     Breast Cancer Mother      Alzheimer Disease Father      Prostate Cancer Father 60     Thyroid Disease Sister      Breast Cancer Maternal Grandmother      Colon Cancer No family hx of          ROS:  REVIEW OF SYSTEMS:    RESP: negative for cough, dyspnea, wheezing, hemoptysis  CV: negative for chest pain, palpitations, PND, HEARD, orthopnea; reports no changes in their ability to perform physical activity (from cardiovascular standpoint)  GI: negative for dysphagia, N/V, pain, melena, diarrhea and constipation  NEURO: negative for numbness/tingling, paralysis, incoordination, or focal weakness     OBJECTIVE:                                                    /74  Pulse 69  Temp 97.7  F (36.5  C) (Oral)  Resp 14  Wt 239 lb 4.8 oz (108.5 kg)  SpO2 97%  BMI 30.72 kg/m2     GENERAL alert and no distress  EYES:  Normal sclera,conjunctiva, EOMI  HENT: oral and posterior pharynx without lesions or erythema, facies symmetric  NECK: Neck supple. No LAD, without thyroidmegaly or JVD., Carotids without bruits.  RESP: Clear to ausculation bilaterally without wheezes or crackles. Normal BS in all fields.  CV: RRR normal S1S2 without murmurs, rubs or gallops. PMI normal  LYMPH: no cervical lymph adenopathy appreciated  MS: extremities- no gross deformities of the visible extremities noted, no edema  PSYCH: Alert and oriented times 3; speech- coherent  SKIN:  No obvious significant skin lesions on visible portions of face   ELBOW:  Left olecranon bursa area swollen, warm, elbow joitn FROM in all directions       ASSESSMENT/PLAN:       "                                                (M71.122) Septic olecranon bursitis of left elbow  (primary encounter diagnosis)  Comment: probable septic bursitis.   Will give clindamycin, adivsed of lse including risk of antibiotic induced diarrhea (including Clostridium difficile)  Will get ortho appt in the next day or so.   Advised ER immediately if any spread  Plan: clindamycin (CLEOCIN) 300 MG capsule,         ORTHOPEDICS ADULT REFERRAL            *  Infected elbow bursa.     *  Clindamycin 300 mg four times per day for 10 days.     *  Consider an over the counter \"probiotic\" like Align or CUltuerlle.      *  If you develop any severe diarrhea in the next month from the antibiotics, let us know.      *  [facility] [phone]   [email or web address] in 1-2 days for re-evalaution.  You may need some fluid removed.     *  If there is any worsening of the swelling, pain, etc, even if you are taking the antibiotics, then go to the [facility] ER or [facility] Emergency Room.               See Patient Instructions    STANLEY CHILD M.D., MD  [facility]   "

## 2018-07-10 NOTE — PATIENT INSTRUCTIONS
"*  Infected elbow bursa.     *  Clindamycin 300 mg four times per day for 10 days.     *  Consider an over the counter \"probiotic\" like Align or CUltuerlle.      *  If you develop any severe diarrhea in the next month from the antibiotics, let us know.      *  Follow up with orthopedic surgeon at [facility] [phone]   [email or web address] in 1-2 days for re-evalaution.  You may need some fluid removed.     *  If there is any worsening of the swelling, pain, etc, even if you are taking the antibiotics, then go to the [facility] ER or [facility] Emergency Room.        "

## 2018-07-11 ENCOUNTER — TRANSFERRED RECORDS (OUTPATIENT)
Dept: HEALTH INFORMATION MANAGEMENT | Facility: CLINIC | Age: 63
End: 2018-07-11

## 2018-10-01 NOTE — PROGRESS NOTES
Subjective:  HPI                    Objective:  System    Physical Exam    General     ROS    Assessment/Plan:    DISCHARGE REPORT    Progress reporting period is from 10/11/2017 to 10/11/2017.       SUBJECTIVE  Subjective changes noted by patient:  Patient was seen on 10/11/2017 for evaluation of his R shoulder pain.  Current status is unknown as patient did not return for additional follow-up visits.  Current Pain level: Unknown as patient did not return for additional follow-up visits.  Initial Pain level: 1/10.   Changes in function:  Unknown as patient did not return for additional follow-up visits.  Adverse reaction to treatment or activity: Unknown as patient did not return for additional follow-up visits.    OBJECTIVE  Changes noted in objective findings:  Current objective measurements are unavailable as patient did not return for additional follow-up visits.        ASSESSMENT/PLAN  Patient was only seen for 1 visit with treatment focusing on scapular and rotator cuff strengthening exercises for the R shoulder.  He has not returned for additional follow-up visits so current assessment is unavailable.  Updated problem list and treatment plan: Diagnosis 1:  R shoulder pain   No updated problem list or treatment plan as patient did not return for additional visits and is discharged from PT at this time.    STG/LTGs have been met or progress has been made towards goals:  Unknown as patient did not return for additional follow-up visits.  Assessment of Progress: The patient has not returned to therapy. Current status is unknown.  Self Management Plans:  Patient has been instructed in a home treatment program.  Patient  has been instructed in self management of symptoms.  Jaswinder continues to require the following intervention to meet STG and LTG's:  PT intervention is no longer required to meet STG/LTG.    Recommendations:  Patient is discharged from PT as he did not return for further follow-up visits.    Please  refer to the daily flowsheet for treatment today, total treatment time and time spent performing 1:1 timed codes.

## 2018-11-07 ENCOUNTER — OFFICE VISIT (OUTPATIENT)
Dept: INTERNAL MEDICINE | Facility: CLINIC | Age: 63
End: 2018-11-07
Payer: COMMERCIAL

## 2018-11-07 VITALS
RESPIRATION RATE: 20 BRPM | TEMPERATURE: 98.3 F | BODY MASS INDEX: 30.93 KG/M2 | OXYGEN SATURATION: 94 % | DIASTOLIC BLOOD PRESSURE: 81 MMHG | HEIGHT: 74 IN | HEART RATE: 70 BPM | SYSTOLIC BLOOD PRESSURE: 121 MMHG | WEIGHT: 241 LBS

## 2018-11-07 DIAGNOSIS — Z00.00 ROUTINE GENERAL MEDICAL EXAMINATION AT A HEALTH CARE FACILITY: Primary | ICD-10-CM

## 2018-11-07 DIAGNOSIS — Z11.59 NEED FOR HEPATITIS C SCREENING TEST: ICD-10-CM

## 2018-11-07 DIAGNOSIS — R97.20 ELEVATED PROSTATE SPECIFIC ANTIGEN (PSA): ICD-10-CM

## 2018-11-07 DIAGNOSIS — E78.2 MIXED HYPERLIPIDEMIA: ICD-10-CM

## 2018-11-07 DIAGNOSIS — M77.12 LEFT LATERAL EPICONDYLITIS: ICD-10-CM

## 2018-11-07 DIAGNOSIS — Z12.5 SCREENING FOR PROSTATE CANCER: ICD-10-CM

## 2018-11-07 PROCEDURE — 99213 OFFICE O/P EST LOW 20 MIN: CPT | Mod: 25 | Performed by: INTERNAL MEDICINE

## 2018-11-07 PROCEDURE — 99396 PREV VISIT EST AGE 40-64: CPT | Performed by: INTERNAL MEDICINE

## 2018-11-07 RX ORDER — GINGER ROOT 550 MG
550 CAPSULE ORAL DAILY
COMMUNITY
End: 2020-11-03

## 2018-11-07 RX ORDER — SODIUM PHOSPHATE,MONO-DIBASIC 19G-7G/118
2 ENEMA (ML) RECTAL DAILY
COMMUNITY
End: 2020-11-03

## 2018-11-07 NOTE — PROGRESS NOTES
"SUBJECTIVE:   CC: Jaswinder Monroe is an 63 year old male who presents for preventative health visit.     Physical   Annual:     Getting at least 3 servings of Calcium per day:  Yes    Bi-annual eye exam:  Yes    Dental care twice a year:  Yes    Sleep apnea or symptoms of sleep apnea:  None    Diet:  Breakfast skipped    Frequency of exercise:  None    Taking medications regularly:  Yes    Medication side effects:  None    Additional concerns today:  Yes    1. L arm \"achiness\" .  Over the summer episode of acute forearm pain and significant ecchymosis followed by olecranon bursitis.  This was felt to be related to a insect bite and then infection in the bursa.  He was seen on 2 occasions by other providers.  States since then that the arm is somewhat sore with lifting and at times feels does not have quite the strength in the forearm.  2. Nocturia.  Also notes decreased in urine flow.  AUA score of  3. Elevated PSA.  Noted last year.  Asked to follow-up but has not done so until now    Today's PHQ-2 Score:   PHQ-2 ( 1999 [employer]) 11/7/2018   Q1: Little interest or pleasure in doing things 0   Q2: Feeling down, depressed or hopeless 0   PHQ-2 Score 0   Q1: Little interest or pleasure in doing things Not at all   Q2: Feeling down, depressed or hopeless Not at all   PHQ-2 Score 0       Abuse: Current or Past(Physical, Sexual or Emotional)- No  Do you feel safe in your environment - Yes    Social History   Substance Use Topics     Smoking status: Never Smoker     Smokeless tobacco: Never Used     Alcohol use 2.4 oz/week     4 Standard drinks or equivalent per week      Comment: social     Alcohol Use 11/7/2018   If you drink alcohol do you typically have greater than 3 drinks per day OR greater than 7 drinks per week? No   No flowsheet data found.    Last PSA:   PSA   Date Value Ref Range Status   11/01/2017 4.08 (H) 0 - 4 ug/L Final     Comment:     Assay Method:  Chemiluminescence using Siemens Vista analyzer " "      Reviewed orders with patient. Reviewed health maintenance and updated orders accordingly - Yes    Reviewed and updated as needed this visit by clinical staff  Tobacco  Allergies  Meds  Soc Hx        Reviewed and updated as needed this visit by Provider            Review of Systems  CONSTITUTIONAL: NEGATIVE for fever, chills, change in weight  INTEGUMENTARY/SKIN: NEGATIVE for worrisome rashes, moles or lesions  EYES: NEGATIVE for vision changes or irritation  ENT: NEGATIVE for ear, mouth and throat problems  RESP: NEGATIVE for significant cough or SOB  CV: NEGATIVE for chest pain, palpitations or peripheral edema  GI: NEGATIVE for nausea, abdominal pain, heartburn, or change in bowel habits   male: negative for dysuria, hematuria, decreased urinary stream, erectile dysfunction, urethral discharge  MUSCULOSKELETAL: As above  NEURO: NEGATIVE for weakness, dizziness or paresthesias  ENDOCRINE: NEGATIVE for temperature intolerance, skin/hair changes  PSYCHIATRIC: NEGATIVE for changes in mood or affect    OBJECTIVE:   /81  Pulse 70  Temp 98.3  F (36.8  C) (Oral)  Resp 20  Ht 6' 2\" (1.88 m)  Wt 241 lb (109.3 kg)  SpO2 94%  BMI 30.94 kg/m2  Physical Exam  GENERAL: healthy, alert and no distress  EYES: Eyes grossly normal to inspection, PERRL and conjunctivae and sclerae normal  HENT: ear canals and TM's normal, nose and mouth without ulcers or lesions  NECK: no adenopathy, no asymmetry, masses, or scars and thyroid normal to palpation  RESP: lungs clear to auscultation - no rales, rhonchi or wheezes  CV: regular rate and rhythm, normal S1 S2, no S3 or S4, no murmur, click or rub, no peripheral edema and peripheral pulses strong  ABDOMEN: soft, nontender, no hepatosplenomegaly, no masses and bowel sounds normal  RECTAL: normal sphincter tone, no rectal masses, prostate normal size, smooth, nontender without nodules or masses  MS: There is mild tenderness at the left lateral epicondyle.  Some pain with " "supination is noted.  Muscle strength appears normal the remaining upper extremity and both lower extremities are otherwise normal  SKIN: no suspicious lesions or rashes  NEURO: Normal strength and tone, mentation intact and speech normal  PSYCH: mentation appears normal, affect normal/bright  LYMPH: no cervical, supraclavicular, axillary, or inguinal adenopathy    none     ASSESSMENT/PLAN:   1. Routine general medical examination at a health care facility  See recommendations below.  Otherwise up-to-date on screening.    - Basic metabolic panel; Future    2. Elevated prostate specific antigen (PSA)  Repeat PSA.    3. Mixed hyperlipidemia- 10 yr CV risk 10%  Repeat lipids.  Patient is open to considering a calcium heart scan to further evaluate for plaque to help with risk stratification.  - Lipid panel reflex to direct LDL Fasting; Future    4. Left lateral epicondylitis  Counterforce brace may be helpful.  My suspicion is that he may have suffered some tendon injury over the summer rather than an insect bite that resulted in his acute symptoms at that time and what we see now is the residual effect of that.  Can consider physical therapy if this does not improve further    5. Need for hepatitis C screening test  - Hepatitis C Screen Reflex to HCV RNA Quant and Genotype; Future    6. Screening for prostate cancer  - Prostate spec antigen screen; Future    COUNSELING:   Reviewed preventive health counseling, as reflected in patient instructions    BP Readings from Last 1 Encounters:   11/07/18 121/81     Estimated body mass index is 30.94 kg/(m^2) as calculated from the following:    Height as of this encounter: 6' 2\" (1.88 m).    Weight as of this encounter: 241 lb (109.3 kg).      Weight management plan: Discussed healthy diet and exercise guidelines and patient will follow up in 12 months in clinic to re-evaluate.     reports that he has never smoked. He has never used smokeless tobacco.      Counseling " Resources:  ATP IV Guidelines  Pooled Cohorts Equation Calculator  FRAX Risk Assessment  ICSI Preventive Guidelines  Dietary Guidelines for Americans, 2010  Results [employer]'s MyPlate  ASA Prophylaxis  Lung CA Screening    The 10-year ASCVD risk score (Rox HERRERA Jr, et al., 2013) is: 10.5%    Values used to calculate the score:      Age: 63 years      Sex: Male      Is Non- : No      Diabetic: No      Tobacco smoker: No      Systolic Blood Pressure: 121 mmHg      Is BP treated: No      HDL Cholesterol: 43 mg/dL      Total Cholesterol: 226 mg/dL   Antwan Ron MD  [facility]  Answers for HPI/ROS submitted by the patient on 11/7/2018   PHQ-2 Score: 0

## 2018-11-07 NOTE — PATIENT INSTRUCTIONS
Preventive Health Recommendations  Male Ages 50 - 64    Yearly exam:             See your health care provider every year in order to  o   Review health changes.   o   Discuss preventive care.    o   Review your medicines if your doctor has prescribed any.     Have a cholesterol test every 5 years, or more frequently if you are at risk for high cholesterol/heart disease.     Have a diabetes test (fasting glucose) every three years. If you are at risk for diabetes, you should have this test more often.     Have a colonoscopy at age 50, or have a yearly FIT test (stool test). These exams will check for colon cancer.      Talk with your health care provider about whether or not a prostate cancer screening test (PSA) is right for you.    You should be tested each year for STDs (sexually transmitted diseases), if you re at risk.     Shots: Get a flu shot each year. Get a tetanus shot every 10 years.     Nutrition:    Eat at least 5 servings of fruits and vegetables daily.     Eat whole-grain bread, whole-wheat pasta and brown rice instead of white grains and rice.     Get adequate Calcium and Vitamin D.     Lifestyle    Exercise for at least 150 minutes a week (30 minutes a day, 5 days a week). This will help you control your weight and prevent disease.     Limit alcohol to one drink per day.     No smoking.     Wear sunscreen to prevent skin cancer.     See your dentist every six months for an exam and cleaning.     See your eye doctor every 1 to 2 years.    The heart-healthy Mediterranean is a healthy eating plan based on typical foods and recipes of Mediterranean-style cooking. Here's how to adopt the Mediterranean diet.   If you're looking for a heart-healthy eating plan, the Mediterranean diet might be right for you. The Mediterranean diet incorporates the basics of healthy eating -- plus a splash of flavorful olive oil and perhaps even a glass of red wine -- among other components characterizing the traditional  cooking style of countries bordering the [place].  Most healthy diets include fruits, vegetables, fish and whole grains, and limit unhealthy fats. While these parts of a healthy diet remain tried-and-true, subtle variations or differences in proportions of certain foods may make a difference in your risk of heart disease.  Research has shown that the traditional Mediterranean diet reduces the risk of heart disease. In fact, an analysis of more than 1.5 million healthy adults demonstrated that following a Mediterranean diet was associated with a reduced risk of death from heart disease and cancer, as well as a reduced incidence of Parkinson's and Alzheimer's diseases.   The Dietary Guidelines for Americans recommends the Mediterranean diet as an eating plan that can help promote health and prevent disease. And the Mediterranean diet is one your whole family can follow for good health.   The Mediterranean diet emphasizes:  Eating primarily plant-based foods, such as fruits and vegetables, whole grains, legumes and nuts   Replacing butter with healthy fats, such as olive oil   Using herbs and spices instead of salt to flavor foods   Limiting red meat to no more than a few times a month   Eating fish and poultry at least twice a week   Drinking red wine in moderation (optional)  The diet also recognizes the importance of being physically active, and enjoying meals with family and friends.  The Mediterranean diet traditionally includes fruits, vegetables and grains. For example, residents of [facility] average six or more servings a day of antioxidant-rich fruits and vegetables.  Grains in the Mediterranean region are typically whole grain and usually contain very few unhealthy trans fats, and bread is an important part of the diet. However, throughout the Mediterranean region, bread is eaten plain or dipped in olive oil -- not eaten with butter or margarine, which contains saturated or trans fats.   Nuts are  "another part of a healthy Mediterranean diet. Nuts are high in fat, but most of the fat is healthy. Because nuts are high in calories, they should not be eaten in large amounts -- generally no more than a handful a day. For the best nutrition, avoid candied or honey-roasted and heavily salted nuts.  The focus of the Mediterranean diet isn't on limiting total fat consumption, but rather on choosing healthier types of fat. The Mediterranean diet discourages saturated fats and hydrogenated oils (trans fats), both of which contribute to heart disease.  The Mediterranean diet features olive oil as the primary source of fat. Olive oil is mainly monounsaturated fat -- a type of fat that can help reduce low-density lipoprotein (LDL) cholesterol levels when used in place of saturated or trans fats. \"Extra-virgin\" and \"virgin\" olive oils (the least processed forms) also contain the highest levels of protective plant compounds that provide antioxidant effects.  Canola oil and some nuts contain the beneficial linolenic acid (a type of omega-3 fatty acid) in addition to healthy unsaturated fat. Omega-3 fatty acids lower triglycerides, decrease blood clotting, and are associated with decreased incidence of sudden heart attacks, improve the health of your blood vessels, and help moderate blood pressure. Fatty fish -- such as mackerel, lake trout, herring, sardines, albacore tuna and salmon -- are rich sources of omega-3 fatty acids. Fish is eaten on a regular basis in the Mediterranean diet.  The health effects of alcohol have been debated for many years, and some doctors are reluctant to encourage alcohol consumption because of the health consequences of excessive drinking. However, alcohol -- in moderation -- has been associated with a reduced risk of heart disease in some research studies.  The Mediterranean diet typically includes a moderate amount of wine, usually red wine. This means no more than 5 ounces (148 milliliters) " of wine daily for women of all ages and men older than age 65 and no more than 10 ounces (296 milliliters) of wine daily for younger men. More than this may increase the risk of health problems, including increased risk of certain types of cancer.   If you're unable to limit your alcohol intake to the amounts defined above, if you have a personal or family history of alcohol abuse, or if you have heart or liver disease, refrain from drinking wine or any other alcohol.  The Mediterranean diet is a delicious and healthy way to eat. Many people who switch to this style of eating say they'll never eat any other way. Here are some specific steps to get you started:   Eat your veggies and fruits -- and switch to whole grains. [place] of plant foods should make up the majority of your meals. They should be minimally processed -- fresh and whole are best. Include veggies and fruits in every meal and eat them for snacks as well. Switch to whole-grain bread and cereal, and begin to eat more whole-grain rice and pasta products. Keep baby carrots, apples and bananas on hand for quick, satisfying snacks. Fruit salads are a wonderful way to eat a variety of healthy fruit.   Go nuts. Nuts and seeds are good sources of fiber, protein and healthy fats. Keep almonds, cashews, pistachios and walnuts on hand for a quick snack. Choose natural peanut butter, rather than the kind with hydrogenated fat added. Try blended sesame seeds (tahini) as a dip or spread for bread.   Pass on the butter. Try olive or canola oil as a healthy replacement for butter or margarine. Lightly drizzle it over vegetables. After cooking pasta, add a touch of olive oil, some garlic and green onions for flavoring. Dip bread in flavored olive oil or lightly spread it on whole-grain bread for a tasty alternative to butter. Try tahini as a dip or spread for bread too.   Spice it up. Herbs and spices make food tasty and can  for salt and fat in recipes.   Go  fish. Eat fish at least twice a week. Fresh or water-packed tuna, salmon, trout, mackerel and herring are healthy choices. [place], bake or broil fish for great taste and easy cleanup. Avoid breaded and fried fish.   Rein in the red meat. Limit red meat to no more than a few times a month. Substitute fish and poultry for red meat. When choosing red meat, make sure it's lean and keep portions small (about the size of a deck of cards). Also avoid sausage, hernandez and other high-fat, processed meats.   Choose low-fat dairy. Limit higher fat dairy products, such as whole or 2 percent milk, cheese and ice cream. Switch to skim milk, fat-free yogurt and low-fat cheese

## 2018-11-07 NOTE — MR AVS SNAPSHOT
After Visit Summary   11/7/2018    Jaswinder Monroe    MRN: [id number]           Patient Information     Date Of Birth          1955        Visit Information        Provider Department      11/7/2018 7:00 AM Antwan Ron MD [facility]        Today's Diagnoses     Routine general medical examination at a health care facility    -  1    Elevated prostate specific antigen (PSA)        Mixed hyperlipidemia- 10 yr CV risk 8.1%        Left lateral epicondylitis        Need for hepatitis C screening test        Screening for prostate cancer          Care Instructions      Preventive Health Recommendations  Male Ages 50 - 64    Yearly exam:             See your health care provider every year in order to  o   Review health changes.   o   Discuss preventive care.    o   Review your medicines if your doctor has prescribed any.     Have a cholesterol test every 5 years, or more frequently if you are at risk for high cholesterol/heart disease.     Have a diabetes test (fasting glucose) every three years. If you are at risk for diabetes, you should have this test more often.     Have a colonoscopy at age 50, or have a yearly FIT test (stool test). These exams will check for colon cancer.      Talk with your health care provider about whether or not a prostate cancer screening test (PSA) is right for you.    You should be tested each year for STDs (sexually transmitted diseases), if you re at risk.     Shots: Get a flu shot each year. Get a tetanus shot every 10 years.     Nutrition:    Eat at least 5 servings of fruits and vegetables daily.     Eat whole-grain bread, whole-wheat pasta and brown rice instead of white grains and rice.     Get adequate Calcium and Vitamin D.     Lifestyle    Exercise for at least 150 minutes a week (30 minutes a day, 5 days a week). This will help you control your weight and prevent disease.     Limit alcohol to one drink per day.     No smoking.      Wear sunscreen to prevent skin cancer.     See your dentist every six months for an exam and cleaning.     See your eye doctor every 1 to 2 years.    The heart-healthy Mediterranean is a healthy eating plan based on typical foods and recipes of Mediterranean-style cooking. Here's how to adopt the Mediterranean diet.   If you're looking for a heart-healthy eating plan, the Mediterranean diet might be right for you. The Mediterranean diet incorporates the basics of healthy eating -- plus a splash of flavorful olive oil and perhaps even a glass of red wine -- among other components characterizing the traditional cooking style of countries bordering the [place].  Most healthy diets include fruits, vegetables, fish and whole grains, and limit unhealthy fats. While these parts of a healthy diet remain tried-and-true, subtle variations or differences in proportions of certain foods may make a difference in your risk of heart disease.  Research has shown that the traditional Mediterranean diet reduces the risk of heart disease. In fact, an analysis of more than 1.5 million healthy adults demonstrated that following a Mediterranean diet was associated with a reduced risk of death from heart disease and cancer, as well as a reduced incidence of Parkinson's and Alzheimer's diseases.   The Dietary Guidelines for Americans recommends the Mediterranean diet as an eating plan that can help promote health and prevent disease. And the Mediterranean diet is one your whole family can follow for good health.   The Mediterranean diet emphasizes:  Eating primarily plant-based foods, such as fruits and vegetables, whole grains, legumes and nuts   Replacing butter with healthy fats, such as olive oil   Using herbs and spices instead of salt to flavor foods   Limiting red meat to no more than a few times a month   Eating fish and poultry at least twice a week   Drinking red wine in moderation (optional)  The diet also  "recognizes the importance of being physically active, and enjoying meals with family and friends.  The Mediterranean diet traditionally includes fruits, vegetables and grains. For example, residents of [facility] average six or more servings a day of antioxidant-rich fruits and vegetables.  Grains in the Mediterranean region are typically whole grain and usually contain very few unhealthy trans fats, and bread is an important part of the diet. However, throughout the Mediterranean region, bread is eaten plain or dipped in olive oil -- not eaten with butter or margarine, which contains saturated or trans fats.   Nuts are another part of a healthy Mediterranean diet. Nuts are high in fat, but most of the fat is healthy. Because nuts are high in calories, they should not be eaten in large amounts -- generally no more than a handful a day. For the best nutrition, avoid candied or honey-roasted and heavily salted nuts.  The focus of the Mediterranean diet isn't on limiting total fat consumption, but rather on choosing healthier types of fat. The Mediterranean diet discourages saturated fats and hydrogenated oils (trans fats), both of which contribute to heart disease.  The Mediterranean diet features olive oil as the primary source of fat. Olive oil is mainly monounsaturated fat -- a type of fat that can help reduce low-density lipoprotein (LDL) cholesterol levels when used in place of saturated or trans fats. \"Extra-virgin\" and \"virgin\" olive oils (the least processed forms) also contain the highest levels of protective plant compounds that provide antioxidant effects.  Canola oil and some nuts contain the beneficial linolenic acid (a type of omega-3 fatty acid) in addition to healthy unsaturated fat. Omega-3 fatty acids lower triglycerides, decrease blood clotting, and are associated with decreased incidence of sudden heart attacks, improve the health of your blood vessels, and help moderate blood pressure. Fatty fish -- " such as mackerel, lake trout, herring, sardines, albacore tuna and salmon -- are rich sources of omega-3 fatty acids. Fish is eaten on a regular basis in the Mediterranean diet.  The health effects of alcohol have been debated for many years, and some doctors are reluctant to encourage alcohol consumption because of the health consequences of excessive drinking. However, alcohol -- in moderation -- has been associated with a reduced risk of heart disease in some research studies.  The Mediterranean diet typically includes a moderate amount of wine, usually red wine. This means no more than 5 ounces (148 milliliters) of wine daily for women of all ages and men older than age 65 and no more than 10 ounces (296 milliliters) of wine daily for younger men. More than this may increase the risk of health problems, including increased risk of certain types of cancer.   If you're unable to limit your alcohol intake to the amounts defined above, if you have a personal or family history of alcohol abuse, or if you have heart or liver disease, refrain from drinking wine or any other alcohol.  The Mediterranean diet is a delicious and healthy way to eat. Many people who switch to this style of eating say they'll never eat any other way. Here are some specific steps to get you started:   Eat your veggies and fruits -- and switch to whole grains. Avariety of plant foods should make up the majority of your meals. They should be minimally processed -- fresh and whole are best. Include veggies and fruits in every meal and eat them for snacks as well. Switch to whole-grain bread and cereal, and begin to eat more whole-grain rice and pasta products. Keep baby carrots, apples and bananas on hand for quick, satisfying snacks. Fruit salads are a wonderful way to eat a variety of healthy fruit.   Go nuts. Nuts and seeds are good sources of fiber, protein and healthy fats. Keep almonds, cashews, pistachios and walnuts on hand for a quick  snack. Choose natural peanut butter, rather than the kind with hydrogenated fat added. Try blended sesame seeds (tahini) as a dip or spread for bread.   Pass on the butter. Try olive or canola oil as a healthy replacement for butter or margarine. Lightly drizzle it over vegetables. After cooking pasta, add a touch of olive oil, some garlic and green onions for flavoring. Dip bread in flavored olive oil or lightly spread it on whole-grain bread for a tasty alternative to butter. Try tahini as a dip or spread for bread too.   Spice it up. Herbs and spices make food tasty and can  for salt and fat in recipes.   Go fish. Eat fish at least twice a week. Fresh or water-packed tuna, salmon, trout, mackerel and herring are healthy choices. [place], bake or broil fish for great taste and easy cleanup. Avoid breaded and fried fish.   Rein in the red meat. Limit red meat to no more than a few times a month. Substitute fish and poultry for red meat. When choosing red meat, make sure it's lean and keep portions small (about the size of a deck of cards). Also avoid sausage, hernandez and other high-fat, processed meats.   Choose low-fat dairy. Limit higher fat dairy products, such as whole or 2 percent milk, cheese and ice cream. Switch to skim milk, fat-free yogurt and low-fat cheese            Follow-ups after your visit        Future tests that were ordered for you today     Open Future Orders        Priority Expected Expires Ordered    Hepatitis C Screen Reflex to HCV RNA Quant and Genotype Routine 11/7/2018 12/7/2018 11/7/2018    Lipid panel reflex to direct LDL Fasting Routine 11/7/2018 12/7/2018 11/7/2018    Basic metabolic panel Routine 11/7/2018 12/7/2018 11/7/2018    Prostate spec antigen screen Routine 11/7/2018 12/7/2018 11/7/2018            Who to contact     If you have questions or need follow up information about today's clinic visit or your schedule please contact [facility] directly at  "[phone].  Normal or non-critical lab and imaging results will be communicated to you by MyChart, letter or phone within 4 business days after the clinic has received the results. If you do not hear from us within 7 days, please contact the clinic through [employer] or phone. If you have a critical or abnormal lab result, we will notify you by phone as soon as possible.  Submit refill requests through [employer] or call your pharmacy and they will forward the refill request to us. Please allow 3 business days for your refill to be completed.          Additional Information About Your Visit        [employer] Information     [employer] gives you secure access to your electronic health record. If you see a primary care provider, you can also send messages to your care team and make appointments. If you have questions, please call your primary care clinic.  If you do not have a primary care provider, please call [phone] and they will assist you.        Care EveryWhere ID     This is your Care EveryWhere ID. This could be used by other organizations to access your [facility] medical records  [id number]        Your Vitals Were     Pulse Temperature Respirations Height Pulse Oximetry BMI (Body Mass Index)    70 98.3  F (36.8  C) (Oral) 20 6' 2\" (1.88 m) 94% 30.94 kg/m2       Blood Pressure from Last 3 Encounters:   11/07/18 121/81   07/10/18 122/74   06/28/18 112/70    Weight from Last 3 Encounters:   11/07/18 241 lb (109.3 kg)   07/10/18 239 lb 4.8 oz (108.5 kg)   06/28/18 240 lb 9.6 oz (109.1 kg)               Primary Care Provider Office Phone # Fax #    Antwan Ron [phone]       [address] [place] [id number]        Equal Access to Services     KATY ECKERT : Juno Laurent, waroni raymundo, qaybta geno may, kevin hardy. So [facility] [phone].    ATENCIÓN: Si habla español, tiene a mckeon disposición servicios gratuitos de asistencia " lingüística. Nehemias al [phone].    We comply with applicable federal civil rights laws and [place] laws. We do not discriminate on the basis of race, color, national origin, age, disability, sex, sexual orientation, or gender identity.            Thank you!     Thank you for choosing [facility]  for your care. Our goal is always to provide you with excellent care. Hearing back from our patients is one way we can continue to improve our services. Please take a few minutes to complete the written survey that you may receive in the mail after your visit with us. Thank you!             Your Updated Medication List - Protect others around you: Learn how to safely use, store and throw away your medicines at www.disposemymeds.org.          This list is accurate as of 11/7/18  7:47 AM.  Always use your most recent med list.                   Brand Name Dispense Instructions for use Diagnosis    aspirin 81 MG EC tablet      Take 81 mg by mouth        cholecalciferol 5000 units Caps capsule    vitamin D3     Take 10,000 Units by mouth        Coconut Oil Oil      4,000 mg        faisal root 550 MG Caps capsule      Take 550 mg by mouth daily        glucosamine-chondroitin 500-400 MG Caps per capsule      Take 2 capsules by mouth daily        MULTI-VITAMINS Tabs           TURMERIC PO      Take 2,000 mg by mouth

## 2018-11-08 DIAGNOSIS — E78.2 MIXED HYPERLIPIDEMIA: ICD-10-CM

## 2018-11-08 DIAGNOSIS — Z11.59 NEED FOR HEPATITIS C SCREENING TEST: ICD-10-CM

## 2018-11-08 DIAGNOSIS — Z00.00 ROUTINE GENERAL MEDICAL EXAMINATION AT A HEALTH CARE FACILITY: ICD-10-CM

## 2018-11-08 DIAGNOSIS — Z12.5 SCREENING FOR PROSTATE CANCER: ICD-10-CM

## 2018-11-08 LAB
ANION GAP SERPL CALCULATED.3IONS-SCNC: 6 MMOL/L (ref 3–14)
BUN SERPL-MCNC: 23 MG/DL (ref 7–30)
CALCIUM SERPL-MCNC: 9.5 MG/DL (ref 8.5–10.1)
CHLORIDE SERPL-SCNC: 106 MMOL/L (ref 94–109)
CHOLEST SERPL-MCNC: 211 MG/DL
CO2 SERPL-SCNC: 29 MMOL/L (ref 20–32)
CREAT SERPL-MCNC: 1.09 MG/DL (ref 0.66–1.25)
GFR SERPL CREATININE-BSD FRML MDRD: 69 ML/MIN/1.7M2
GLUCOSE SERPL-MCNC: 93 MG/DL (ref 70–99)
HDLC SERPL-MCNC: 42 MG/DL
LDLC SERPL CALC-MCNC: 153 MG/DL
NONHDLC SERPL-MCNC: 169 MG/DL
POTASSIUM SERPL-SCNC: 4.5 MMOL/L (ref 3.4–5.3)
PSA SERPL-ACNC: 3.71 UG/L (ref 0–4)
SODIUM SERPL-SCNC: 141 MMOL/L (ref 133–144)
TRIGL SERPL-MCNC: 82 MG/DL

## 2018-11-08 PROCEDURE — 80048 BASIC METABOLIC PNL TOTAL CA: CPT | Performed by: INTERNAL MEDICINE

## 2018-11-08 PROCEDURE — G0103 PSA SCREENING: HCPCS | Performed by: INTERNAL MEDICINE

## 2018-11-08 PROCEDURE — 86803 HEPATITIS C AB TEST: CPT | Performed by: INTERNAL MEDICINE

## 2018-11-08 PROCEDURE — 80061 LIPID PANEL: CPT | Performed by: INTERNAL MEDICINE

## 2018-11-08 PROCEDURE — 36415 COLL VENOUS BLD VENIPUNCTURE: CPT | Performed by: INTERNAL MEDICINE

## 2018-11-09 LAB — HCV AB SERPL QL IA: NONREACTIVE

## 2019-01-22 ENCOUNTER — HOSPITAL ENCOUNTER (OUTPATIENT)
Dept: CARDIOLOGY | Facility: CLINIC | Age: 64
Discharge: HOME | End: 2019-01-22
Attending: INTERNAL MEDICINE | Admitting: INTERNAL MEDICINE
Payer: COMMERCIAL

## 2019-01-22 DIAGNOSIS — E78.2 MIXED HYPERLIPIDEMIA: ICD-10-CM

## 2019-01-22 PROCEDURE — 75571 CT HRT W/O DYE W/CA TEST: CPT

## 2019-01-22 PROCEDURE — 75571 CT HRT W/O DYE W/CA TEST: CPT | Mod: 26 | Performed by: INTERNAL MEDICINE

## 2019-10-03 ENCOUNTER — HEALTH MAINTENANCE LETTER (OUTPATIENT)
Age: 64
End: 2019-10-03

## 2019-11-13 ENCOUNTER — OFFICE VISIT (OUTPATIENT)
Dept: INTERNAL MEDICINE | Facility: CLINIC | Age: 64
End: 2019-11-13
Payer: COMMERCIAL

## 2019-11-13 VITALS
SYSTOLIC BLOOD PRESSURE: 116 MMHG | BODY MASS INDEX: 29.76 KG/M2 | OXYGEN SATURATION: 95 % | TEMPERATURE: 97.4 F | DIASTOLIC BLOOD PRESSURE: 78 MMHG | HEIGHT: 74 IN | HEART RATE: 60 BPM | WEIGHT: 231.9 LBS

## 2019-11-13 DIAGNOSIS — E78.2 MIXED HYPERLIPIDEMIA: ICD-10-CM

## 2019-11-13 DIAGNOSIS — Z13.1 SCREENING FOR DIABETES MELLITUS: ICD-10-CM

## 2019-11-13 DIAGNOSIS — Z00.00 ROUTINE GENERAL MEDICAL EXAMINATION AT A HEALTH CARE FACILITY: Primary | ICD-10-CM

## 2019-11-13 DIAGNOSIS — Z12.5 SCREENING FOR PROSTATE CANCER: ICD-10-CM

## 2019-11-13 PROBLEM — R97.20 ELEVATED PROSTATE SPECIFIC ANTIGEN (PSA): Status: RESOLVED | Noted: 2017-11-01 | Resolved: 2019-11-13

## 2019-11-13 LAB
ALT SERPL W P-5'-P-CCNC: 43 U/L (ref 0–70)
ANION GAP SERPL CALCULATED.3IONS-SCNC: 5 MMOL/L (ref 3–14)
AST SERPL W P-5'-P-CCNC: 18 U/L (ref 0–45)
BUN SERPL-MCNC: 21 MG/DL (ref 7–30)
CALCIUM SERPL-MCNC: 9 MG/DL (ref 8.5–10.1)
CHLORIDE SERPL-SCNC: 108 MMOL/L (ref 94–109)
CHOLEST SERPL-MCNC: 214 MG/DL
CO2 SERPL-SCNC: 28 MMOL/L (ref 20–32)
CREAT SERPL-MCNC: 1.01 MG/DL (ref 0.66–1.25)
GFR SERPL CREATININE-BSD FRML MDRD: 79 ML/MIN/{1.73_M2}
GLUCOSE SERPL-MCNC: 107 MG/DL (ref 70–99)
HDLC SERPL-MCNC: 38 MG/DL
LDLC SERPL CALC-MCNC: 139 MG/DL
NONHDLC SERPL-MCNC: 176 MG/DL
POTASSIUM SERPL-SCNC: 5 MMOL/L (ref 3.4–5.3)
PSA SERPL-ACNC: 3.8 UG/L (ref 0–4)
SODIUM SERPL-SCNC: 141 MMOL/L (ref 133–144)
TRIGL SERPL-MCNC: 186 MG/DL

## 2019-11-13 PROCEDURE — G0103 PSA SCREENING: HCPCS | Performed by: INTERNAL MEDICINE

## 2019-11-13 PROCEDURE — 84460 ALANINE AMINO (ALT) (SGPT): CPT | Performed by: INTERNAL MEDICINE

## 2019-11-13 PROCEDURE — 99396 PREV VISIT EST AGE 40-64: CPT | Mod: 25 | Performed by: INTERNAL MEDICINE

## 2019-11-13 PROCEDURE — 84450 TRANSFERASE (AST) (SGOT): CPT | Performed by: INTERNAL MEDICINE

## 2019-11-13 PROCEDURE — 36415 COLL VENOUS BLD VENIPUNCTURE: CPT | Performed by: INTERNAL MEDICINE

## 2019-11-13 PROCEDURE — 80061 LIPID PANEL: CPT | Performed by: INTERNAL MEDICINE

## 2019-11-13 PROCEDURE — 99213 OFFICE O/P EST LOW 20 MIN: CPT | Mod: 25 | Performed by: INTERNAL MEDICINE

## 2019-11-13 PROCEDURE — 90471 IMMUNIZATION ADMIN: CPT | Performed by: INTERNAL MEDICINE

## 2019-11-13 PROCEDURE — 90750 HZV VACC RECOMBINANT IM: CPT | Performed by: INTERNAL MEDICINE

## 2019-11-13 PROCEDURE — 80048 BASIC METABOLIC PNL TOTAL CA: CPT | Performed by: INTERNAL MEDICINE

## 2019-11-13 RX ORDER — SIMVASTATIN 20 MG
20 TABLET ORAL AT BEDTIME
Qty: 90 TABLET | Refills: 1 | Status: SHIPPED | OUTPATIENT
Start: 2019-11-13 | End: 2020-05-01

## 2019-11-13 ASSESSMENT — MIFFLIN-ST. JEOR: SCORE: 1921.64

## 2019-11-13 NOTE — PATIENT INSTRUCTIONS
Preventive Health Recommendations  Male Ages 50 - 64    Yearly exam:             See your health care provider every year in order to  o   Review health changes.   o   Discuss preventive care.    o   Review your medicines if your doctor has prescribed any.     Have a cholesterol test every 5 years, or more frequently if you are at risk for high cholesterol/heart disease.     Have a diabetes test (fasting glucose) every three years. If you are at risk for diabetes, you should have this test more often.     Have a colonoscopy at age 50, or have a yearly FIT test (stool test). These exams will check for colon cancer.      Talk with your health care provider about whether or not a prostate cancer screening test (PSA) is right for you.    You should be tested each year for STDs (sexually transmitted diseases), if you re at risk.     Shots: Get a flu shot each year. Get a tetanus shot every 10 years.     Nutrition:    Eat at least 5 servings of fruits and vegetables daily.     Eat whole-grain bread, whole-wheat pasta and brown rice instead of white grains and rice.     Get adequate Calcium and Vitamin D.     Lifestyle    Exercise for at least 150 minutes a week (30 minutes a day, 5 days a week). This will help you control your weight and prevent disease.     Limit alcohol to one drink per day.     No smoking.     Wear sunscreen to prevent skin cancer.     See your dentist every six months for an exam and cleaning.     See your eye doctor every 1 to 2 years.  The heart-healthy Mediterranean is a healthy eating plan based on typical foods and recipes of Mediterranean-style cooking. Here's how to adopt the Mediterranean diet.   If you're looking for a heart-healthy eating plan, the Mediterranean diet might be right for you. The Mediterranean diet incorporates the basics of healthy eating -- plus a splash of flavorful olive oil and perhaps even a glass of red wine -- among other components characterizing the traditional  cooking style of countries bordering the [place].  Most healthy diets include fruits, vegetables, fish and whole grains, and limit unhealthy fats. While these parts of a healthy diet remain tried-and-true, subtle variations or differences in proportions of certain foods may make a difference in your risk of heart disease.  Research has shown that the traditional Mediterranean diet reduces the risk of heart disease. In fact, an analysis of more than 1.5 million healthy adults demonstrated that following a Mediterranean diet was associated with a reduced risk of death from heart disease and cancer, as well as a reduced incidence of Parkinson's and Alzheimer's diseases.   The Dietary Guidelines for Americans recommends the Mediterranean diet as an eating plan that can help promote health and prevent disease. And the Mediterranean diet is one your whole family can follow for good health.   The Mediterranean diet emphasizes:  Eating primarily plant-based foods, such as fruits and vegetables, whole grains, legumes and nuts   Replacing butter with healthy fats, such as olive oil   Using herbs and spices instead of salt to flavor foods   Limiting red meat to no more than a few times a month   Eating fish and poultry at least twice a week   Drinking red wine in moderation (optional)  The diet also recognizes the importance of being physically active, and enjoying meals with family and friends.  The Mediterranean diet traditionally includes fruits, vegetables and grains. For example, residents of [facility] average six or more servings a day of antioxidant-rich fruits and vegetables.  Grains in the Mediterranean region are typically whole grain and usually contain very few unhealthy trans fats, and bread is an important part of the diet. However, throughout the Mediterranean region, bread is eaten plain or dipped in olive oil -- not eaten with butter or margarine, which contains saturated or trans fats.   Nuts are  "another part of a healthy Mediterranean diet. Nuts are high in fat, but most of the fat is healthy. Because nuts are high in calories, they should not be eaten in large amounts -- generally no more than a handful a day. For the best nutrition, avoid candied or honey-roasted and heavily salted nuts.  The focus of the Mediterranean diet isn't on limiting total fat consumption, but rather on choosing healthier types of fat. The Mediterranean diet discourages saturated fats and hydrogenated oils (trans fats), both of which contribute to heart disease.  The Mediterranean diet features olive oil as the primary source of fat. Olive oil is mainly monounsaturated fat -- a type of fat that can help reduce low-density lipoprotein (LDL) cholesterol levels when used in place of saturated or trans fats. \"Extra-virgin\" and \"virgin\" olive oils (the least processed forms) also contain the highest levels of protective plant compounds that provide antioxidant effects.  Canola oil and some nuts contain the beneficial linolenic acid (a type of omega-3 fatty acid) in addition to healthy unsaturated fat. Omega-3 fatty acids lower triglycerides, decrease blood clotting, and are associated with decreased incidence of sudden heart attacks, improve the health of your blood vessels, and help moderate blood pressure. Fatty fish -- such as mackerel, lake trout, herring, sardines, albacore tuna and salmon -- are rich sources of omega-3 fatty acids. Fish is eaten on a regular basis in the Mediterranean diet.  The health effects of alcohol have been debated for many years, and some doctors are reluctant to encourage alcohol consumption because of the health consequences of excessive drinking. However, alcohol -- in moderation -- has been associated with a reduced risk of heart disease in some research studies.  The Mediterranean diet typically includes a moderate amount of wine, usually red wine. This means no more than 5 ounces (148 milliliters) " of wine daily for women of all ages and men older than age 65 and no more than 10 ounces (296 milliliters) of wine daily for younger men. More than this may increase the risk of health problems, including increased risk of certain types of cancer.   If you're unable to limit your alcohol intake to the amounts defined above, if you have a personal or family history of alcohol abuse, or if you have heart or liver disease, refrain from drinking wine or any other alcohol.  The Mediterranean diet is a delicious and healthy way to eat. Many people who switch to this style of eating say they'll never eat any other way. Here are some specific steps to get you started:   Eat your veggies and fruits -- and switch to whole grains. [place] of plant foods should make up the majority of your meals. They should be minimally processed -- fresh and whole are best. Include veggies and fruits in every meal and eat them for snacks as well. Switch to whole-grain bread and cereal, and begin to eat more whole-grain rice and pasta products. Keep baby carrots, apples and bananas on hand for quick, satisfying snacks. Fruit salads are a wonderful way to eat a variety of healthy fruit.   Go nuts. Nuts and seeds are good sources of fiber, protein and healthy fats. Keep almonds, cashews, pistachios and walnuts on hand for a quick snack. Choose natural peanut butter, rather than the kind with hydrogenated fat added. Try blended sesame seeds (tahini) as a dip or spread for bread.   Pass on the butter. Try olive or canola oil as a healthy replacement for butter or margarine. Lightly drizzle it over vegetables. After cooking pasta, add a touch of olive oil, some garlic and green onions for flavoring. Dip bread in flavored olive oil or lightly spread it on whole-grain bread for a tasty alternative to butter. Try tahini as a dip or spread for bread too.   Spice it up. Herbs and spices make food tasty and can  for salt and fat in recipes.   Go  fish. Eat fish at least twice a week. Fresh or water-packed tuna, salmon, trout, mackerel and herring are healthy choices. [place], bake or broil fish for great taste and easy cleanup. Avoid breaded and fried fish.   Rein in the red meat. Limit red meat to no more than a few times a month. Substitute fish and poultry for red meat. When choosing red meat, make sure it's lean and keep portions small (about the size of a deck of cards). Also avoid sausage, hernandez and other high-fat, processed meats.   Choose low-fat dairy. Limit higher fat dairy products, such as whole or 2 percent milk, cheese and ice cream. Switch to skim milk, fat-free yogurt and low-fat cheese

## 2019-11-13 NOTE — NURSING NOTE
"Chief Complaint   Patient presents with     Physical      question on shingles vaccine     /78   Pulse 60   Temp 97.4  F (36.3  C) (Temporal)   Ht 1.88 m (6' 2\")   Wt 105.2 kg (231 lb 14.4 oz)   SpO2 95%   BMI 29.77 kg/m   Estimated body mass index is 29.77 kg/m  as calculated from the following:    Height as of this encounter: 1.88 m (6' 2\").    Weight as of this encounter: 105.2 kg (231 lb 14.4 oz).        Health Maintenance due pending provider review:  NONE    n/a    Yael Ramey, TITO  "

## 2019-11-13 NOTE — PROGRESS NOTES
SUBJECTIVE:   CC: Jaswinder Monroe is an 64 year old male who presents for preventative health visit.     Healthy Habits:     Getting at least 3 servings of Calcium per day:  Yes    Bi-annual eye exam:  Yes    Dental care twice a year:  Yes    Sleep apnea or symptoms of sleep apnea:  None    Diet:  Regular (no restrictions) and Breakfast skipped    Frequency of exercise:  4-5 days/week    Duration of exercise:  45-60 minutes    Taking medications regularly:  Yes    Medication side effects:  None    PHQ-2 Total Score: 0    Additional concerns today:  No           Today's PHQ-2 Score:   PHQ-2 ( 1999 Pfizer) 11/13/2019   Q1: Little interest or pleasure in doing things 0   Q2: Feeling down, depressed or hopeless 0   PHQ-2 Score 0   Q1: Little interest or pleasure in doing things Not at all   Q2: Feeling down, depressed or hopeless Not at all   PHQ-2 Score 0       Abuse: Current or Past(Physical, Sexual or Emotional)- NO  Do you feel safe in your environment? YES        Social History     Tobacco Use     Smoking status: Never Smoker     Smokeless tobacco: Never Used   Substance Use Topics     Alcohol use: Yes     Alcohol/week: 4.0 standard drinks     Types: 4 Standard drinks or equivalent per week     Comment: social         Alcohol Use 11/13/2019   Prescreen: >3 drinks/day or >7 drinks/week? No   Prescreen: >3 drinks/day or >7 drinks/week? -   No flowsheet data found.    Last PSA:   PSA   Date Value Ref Range Status   11/08/2018 3.71 0 - 4 ug/L Final     Comment:     Assay Method:  Chemiluminescence using Siemens Vista analyzer       Reviewed orders with patient. Reviewed health maintenance and updated orders accordingly - Yes  Lab work is in process    Reviewed and updated as needed this visit by clinical staff  Tobacco  Allergies  Meds  Med Hx  Surg Hx  Fam Hx  Soc Hx        Reviewed and updated as needed this visit by Provider            Review of Systems  CONSTITUTIONAL: NEGATIVE for fever, chills, change in  "weight  INTEGUMENTARY/SKIN: NEGATIVE for worrisome rashes, moles or lesions  EYES: NEGATIVE for vision changes or irritation  ENT: NEGATIVE for ear, mouth and throat problems  RESP: NEGATIVE for significant cough or SOB  CV: NEGATIVE for chest pain, palpitations or peripheral edema  GI: NEGATIVE for nausea, abdominal pain, heartburn, or change in bowel habits   male: negative for dysuria, hematuria, decreased urinary stream, erectile dysfunction, urethral discharge  MUSCULOSKELETAL: NEGATIVE for significant arthralgias or myalgia  NEURO: NEGATIVE for weakness, dizziness or paresthesias  ENDOCRINE: NEGATIVE for temperature intolerance, skin/hair changes  HEME/ALLERGY/IMMUNE: NEGATIVE for bleeding problems  PSYCHIATRIC: NEGATIVE for changes in mood or affect    OBJECTIVE:   /78   Pulse 60   Temp 97.4  F (36.3  C) (Temporal)   Ht 1.88 m (6' 2\")   Wt 105.2 kg (231 lb 14.4 oz)   SpO2 95%   BMI 29.77 kg/m      Physical Exam  GENERAL: healthy, alert and no distress  EYES: Eyes grossly normal to inspection, PERRL and conjunctivae and sclerae normal  HENT: ear canals and TM's normal, nose and mouth without ulcers or lesions  NECK: no adenopathy, no asymmetry, masses, or scars and thyroid normal to palpation  RESP: lungs clear to auscultation - no rales, rhonchi or wheezes  CV: regular rate and rhythm, normal S1 S2, no S3 or S4, no murmur, click or rub, no peripheral edema and peripheral pulses strong  ABDOMEN: soft, nontender, no hepatosplenomegaly, no masses and bowel sounds normal  MS: no gross musculoskeletal defects noted, no edema  SKIN: no suspicious lesions or rashes  NEURO: Normal strength and tone, mentation intact and speech normal  PSYCH: mentation appears normal, affect normal/bright  LYMPH: no cervical, supraclavicular, axillary, or inguinal adenopathy    Labs reviewed in Epic    ASSESSMENT/PLAN:   1. Routine general medical examination at a health care facility  uptodate on screening- cancer and " "CV  Doing well overall  shingrix today     2. Mixed hyperlipidemia- 10 yr CV risk 10.1%  Ca CT with small amt of calcification  - short term risk likely low  -lifelong risk - given lipids/10 yr score - recommend statin rather than continuing asa for primary prevention  The 10-year ASCVD risk score (Rox HERRERA Jr., et al., 2013) is: 10.1%    Values used to calculate the score:      Age: 64 years      Sex: Male      Is Non- : No      Diabetic: No      Tobacco smoker: No      Systolic Blood Pressure: 116 mmHg      Is BP treated: No      HDL Cholesterol: 42 mg/dL      Total Cholesterol: 211 mg/dL   - Lipid panel reflex to direct LDL Fasting  - simvastatin (ZOCOR) 20 MG tablet; Take 1 tablet (20 mg) by mouth At Bedtime  Dispense: 90 tablet; Refill: 1  - ALT  - AST    3. Screening for diabetes mellitus  - Basic metabolic panel    4. Screening for prostate cancer  - PSA, screen    COUNSELING:   Reviewed preventive health counseling, as reflected in patient instructions    Estimated body mass index is 29.77 kg/m  as calculated from the following:    Height as of this encounter: 1.88 m (6' 2\").    Weight as of this encounter: 105.2 kg (231 lb 14.4 oz).          reports that he has never smoked. He has never used smokeless tobacco.      Counseling Resources:  ATP IV Guidelines  Pooled Cohorts Equation Calculator  FRAX Risk Assessment  ICSI Preventive Guidelines  Dietary Guidelines for Americans, 2010  USDA's MyPlate  ASA Prophylaxis  Lung CA Screening    Antwan Ron MD  [facility]  "

## 2020-05-01 DIAGNOSIS — E78.2 MIXED HYPERLIPIDEMIA: ICD-10-CM

## 2020-05-01 RX ORDER — SIMVASTATIN 20 MG
20 TABLET ORAL AT BEDTIME
Qty: 90 TABLET | Refills: 1 | Status: SHIPPED | OUTPATIENT
Start: 2020-05-01 | End: 2020-11-03

## 2020-11-03 ENCOUNTER — OFFICE VISIT (OUTPATIENT)
Dept: INTERNAL MEDICINE | Facility: CLINIC | Age: 65
End: 2020-11-03
Payer: COMMERCIAL

## 2020-11-03 VITALS
SYSTOLIC BLOOD PRESSURE: 110 MMHG | WEIGHT: 243.8 LBS | BODY MASS INDEX: 31.29 KG/M2 | OXYGEN SATURATION: 94 % | HEIGHT: 74 IN | HEART RATE: 62 BPM | TEMPERATURE: 97.3 F | DIASTOLIC BLOOD PRESSURE: 74 MMHG

## 2020-11-03 DIAGNOSIS — E78.2 MIXED HYPERLIPIDEMIA: ICD-10-CM

## 2020-11-03 DIAGNOSIS — Z13.1 SCREENING FOR DIABETES MELLITUS: ICD-10-CM

## 2020-11-03 DIAGNOSIS — R97.20 ELEVATED PROSTATE SPECIFIC ANTIGEN (PSA): ICD-10-CM

## 2020-11-03 DIAGNOSIS — Z00.00 ROUTINE GENERAL MEDICAL EXAMINATION AT A HEALTH CARE FACILITY: Primary | ICD-10-CM

## 2020-11-03 DIAGNOSIS — Z12.5 SCREENING FOR PROSTATE CANCER: ICD-10-CM

## 2020-11-03 LAB
ANION GAP SERPL CALCULATED.3IONS-SCNC: 2 MMOL/L (ref 3–14)
BUN SERPL-MCNC: 18 MG/DL (ref 7–30)
CALCIUM SERPL-MCNC: 9.1 MG/DL (ref 8.5–10.1)
CHLORIDE SERPL-SCNC: 108 MMOL/L (ref 94–109)
CHOLEST SERPL-MCNC: 170 MG/DL
CO2 SERPL-SCNC: 27 MMOL/L (ref 20–32)
CREAT SERPL-MCNC: 0.92 MG/DL (ref 0.66–1.25)
GFR SERPL CREATININE-BSD FRML MDRD: 88 ML/MIN/{1.73_M2}
GLUCOSE SERPL-MCNC: 86 MG/DL (ref 70–99)
HDLC SERPL-MCNC: 42 MG/DL
LDLC SERPL CALC-MCNC: 95 MG/DL
NONHDLC SERPL-MCNC: 128 MG/DL
POTASSIUM SERPL-SCNC: 4.7 MMOL/L (ref 3.4–5.3)
PSA SERPL-ACNC: 4.75 UG/L (ref 0–4)
SODIUM SERPL-SCNC: 137 MMOL/L (ref 133–144)
TRIGL SERPL-MCNC: 165 MG/DL

## 2020-11-03 PROCEDURE — 90750 HZV VACC RECOMBINANT IM: CPT | Performed by: INTERNAL MEDICINE

## 2020-11-03 PROCEDURE — G0103 PSA SCREENING: HCPCS | Performed by: INTERNAL MEDICINE

## 2020-11-03 PROCEDURE — 36415 COLL VENOUS BLD VENIPUNCTURE: CPT | Performed by: INTERNAL MEDICINE

## 2020-11-03 PROCEDURE — 90471 IMMUNIZATION ADMIN: CPT | Performed by: INTERNAL MEDICINE

## 2020-11-03 PROCEDURE — 80061 LIPID PANEL: CPT | Performed by: INTERNAL MEDICINE

## 2020-11-03 PROCEDURE — 99396 PREV VISIT EST AGE 40-64: CPT | Mod: 25 | Performed by: INTERNAL MEDICINE

## 2020-11-03 PROCEDURE — 80048 BASIC METABOLIC PNL TOTAL CA: CPT | Performed by: INTERNAL MEDICINE

## 2020-11-03 RX ORDER — SIMVASTATIN 20 MG
20 TABLET ORAL AT BEDTIME
Qty: 90 TABLET | Refills: 3 | Status: SHIPPED | OUTPATIENT
Start: 2020-11-03 | End: 2021-11-04

## 2020-11-03 SDOH — HEALTH STABILITY: MENTAL HEALTH: HOW MANY STANDARD DRINKS CONTAINING ALCOHOL DO YOU HAVE ON A TYPICAL DAY?: 1 OR 2

## 2020-11-03 SDOH — HEALTH STABILITY: MENTAL HEALTH: HOW OFTEN DO YOU HAVE A DRINK CONTAINING ALCOHOL?: 2-3 TIMES A WEEK

## 2020-11-03 SDOH — HEALTH STABILITY: MENTAL HEALTH: HOW OFTEN DO YOU HAVE 6 OR MORE DRINKS ON ONE OCCASION?: NOT ASKED

## 2020-11-03 ASSESSMENT — MIFFLIN-ST. JEOR: SCORE: 1970.62

## 2020-11-03 NOTE — NURSING NOTE
"Chief Complaint   Patient presents with     Physical     /88   Pulse 62   Temp 97.3  F (36.3  C) (Temporal)   Ht 1.88 m (6' 2\")   Wt 110.6 kg (243 lb 12.8 oz)   SpO2 94%   BMI 31.30 kg/m   Estimated body mass index is 31.3 kg/m  as calculated from the following:    Height as of this encounter: 1.88 m (6' 2\").    Weight as of this encounter: 110.6 kg (243 lb 12.8 oz).          Yael Ramey CMA  "

## 2020-11-03 NOTE — PROGRESS NOTES
SUBJECTIVE:   CC: Jaswinder Monroe is an 65 year old male who presents for preventative health visit.       Patient has been advised of split billing requirements and indicates understanding: Yes  Healthy Habits:     Getting at least 3 servings of Calcium per day:  Yes    Bi-annual eye exam:  Yes    Dental care twice a year:  Yes    Sleep apnea or symptoms of sleep apnea:  None    Diet:  Regular (no restrictions)    Frequency of exercise:  2-3 days/week    Duration of exercise:  15-30 minutes    Taking medications regularly:  Yes    Medication side effects:  None    PHQ-2 Total Score: 0    Additional concerns today:  No    Last year started on statin due to to mild hyperlipidemia as well as mild elevation in his calcium CT coronary score.      Today's PHQ-2 Score:   PHQ-2 ( 1999 Pfizer) 11/3/2020   Q1: Little interest or pleasure in doing things 0   Q2: Feeling down, depressed or hopeless 0   PHQ-2 Score 0   Q1: Little interest or pleasure in doing things Not at all   Q2: Feeling down, depressed or hopeless Not at all   PHQ-2 Score 0       Abuse: Current or Past(Physical, Sexual or Emotional)- NO  Do you feel safe in your environment? YES        Social History     Tobacco Use     Smoking status: Never Smoker     Smokeless tobacco: Never Used   Substance Use Topics     Alcohol use: Yes     Alcohol/week: 4.0 standard drinks     Types: 4 Standard drinks or equivalent per week     Comment: social         Alcohol Use 11/3/2020   Prescreen: >3 drinks/day or >7 drinks/week? No   Prescreen: >3 drinks/day or >7 drinks/week? -   No flowsheet data found.    Last PSA:   PSA   Date Value Ref Range Status   11/13/2019 3.80 0 - 4 ug/L Final     Comment:     Assay Method:  Chemiluminescence using Siemens Vista analyzer       Reviewed orders with patient. Reviewed health maintenance and updated orders accordingly - Yes  Labs reviewed in EPIC    Reviewed and updated as needed this visit by clinical staff  Tobacco  Allergies  Meds    Med Hx  Surg Hx  Fam Hx  Soc Hx        Reviewed and updated as needed this visit by Provider                    Review of Systems  CONSTITUTIONAL: NEGATIVE for fever, chills, change in weight  INTEGUMENTARY/SKIN: NEGATIVE for worrisome rashes, moles or lesions  EYES: NEGATIVE for vision changes or irritation  ENT: NEGATIVE for ear, mouth and throat problems  RESP: NEGATIVE for significant cough or SOB  CV: NEGATIVE for chest pain, palpitations or peripheral edema  GI: NEGATIVE for nausea, abdominal pain, heartburn, or change in bowel habits   male: negative for dysuria, hematuria, decreased urinary stream, erectile dysfunction, urethral discharge  MUSCULOSKELETAL: NEGATIVE for significant arthralgias or myalgia  NEURO: NEGATIVE for weakness, dizziness or paresthesias  ENDOCRINE: NEGATIVE for temperature intolerance, skin/hair changes  HEME/ALLERGY/IMMUNE: NEGATIVE for bleeding problems  PSYCHIATRIC: NEGATIVE for changes in mood or affect    OBJECTIVE:   There were no vitals taken for this visit.    Physical Exam  GENERAL: healthy, alert and no distress  EYES: Eyes grossly normal to inspection, PERRL and conjunctivae and sclerae normal  HENT: ear canals and TM's normal, nose and mouth without ulcers or lesions  NECK: no adenopathy, no asymmetry, masses, or scars and thyroid normal to palpation  RESP: lungs clear to auscultation - no rales, rhonchi or wheezes  CV: regular rate and rhythm, normal S1 S2, no S3 or S4, no murmur, click or rub, no peripheral edema and peripheral pulses strong  ABDOMEN: soft, nontender, no hepatosplenomegaly, no masses and bowel sounds normal  MS: no gross musculoskeletal defects noted, no edema  SKIN: no suspicious lesions or rashes  NEURO: Normal strength and tone, mentation intact and speech normal  PSYCH: mentation appears normal, affect normal/bright  LYMPH: no cervical, supraclavicular, axillary, or inguinal adenopathy    Labs reviewed in Epic    ASSESSMENT/PLAN:   1. Routine  "general medical examination at a health care facility  Excellent health.  Up-to-date on cancer screenings  Immunizations updated    2. Mixed hyperlipidemia- 10 yr CV risk 10.1%  Continue statin  - simvastatin (ZOCOR) 20 MG tablet; Take 1 tablet (20 mg) by mouth At Bedtime  Dispense: 90 tablet; Refill: 3  - Lipid panel reflex to direct LDL Fasting    3. Screening for prostate cancer  - Prostate spec antigen screen    4. Screening for diabetes mellitus  - Basic metabolic panel    Patient has been advised of split billing requirements and indicates understanding: Yes  COUNSELING:   Reviewed preventive health counseling, as reflected in patient instructions       Regular exercise       Healthy diet/nutrition    Estimated body mass index is 29.77 kg/m  as calculated from the following:    Height as of 11/13/19: 1.88 m (6' 2\").    Weight as of 11/13/19: 105.2 kg (231 lb 14.4 oz).         He reports that he has never smoked. He has never used smokeless tobacco.      Counseling Resources:  ATP IV Guidelines  Pooled Cohorts Equation Calculator  FRAX Risk Assessment  ICSI Preventive Guidelines  Dietary Guidelines for Americans, 2010  [employer]'s MyPlate  ASA Prophylaxis  Lung CA Screening    Antwan Ron MD  [facility]  "

## 2020-12-02 DIAGNOSIS — R97.20 ELEVATED PROSTATE SPECIFIC ANTIGEN (PSA): ICD-10-CM

## 2020-12-02 LAB — PSA SERPL-ACNC: 4.54 UG/L (ref 0–4)

## 2020-12-02 PROCEDURE — 36415 COLL VENOUS BLD VENIPUNCTURE: CPT | Performed by: INTERNAL MEDICINE

## 2020-12-02 PROCEDURE — G0103 PSA SCREENING: HCPCS | Performed by: INTERNAL MEDICINE

## 2020-12-07 ENCOUNTER — MYC MEDICAL ADVICE (OUTPATIENT)
Dept: INTERNAL MEDICINE | Facility: CLINIC | Age: 65
End: 2020-12-07

## 2020-12-07 DIAGNOSIS — R97.20 ELEVATED PROSTATE SPECIFIC ANTIGEN (PSA): Primary | ICD-10-CM

## 2021-02-22 ENCOUNTER — VIRTUAL VISIT (OUTPATIENT)
Dept: UROLOGY | Facility: CLINIC | Age: 66
End: 2021-02-22
Payer: COMMERCIAL

## 2021-02-22 VITALS — BODY MASS INDEX: 29.13 KG/M2 | WEIGHT: 227 LBS | HEIGHT: 74 IN

## 2021-02-22 DIAGNOSIS — R97.20 ELEVATED PROSTATE SPECIFIC ANTIGEN (PSA): ICD-10-CM

## 2021-02-22 PROCEDURE — 99203 OFFICE O/P NEW LOW 30 MIN: CPT | Mod: 95 | Performed by: STUDENT IN AN ORGANIZED HEALTH CARE EDUCATION/TRAINING PROGRAM

## 2021-02-22 ASSESSMENT — PAIN SCALES - GENERAL: PAINLEVEL: NO PAIN (0)

## 2021-02-22 ASSESSMENT — MIFFLIN-ST. JEOR: SCORE: 1890.45

## 2021-02-22 NOTE — LETTER
2/22/2021       RE: Jaswinder Monroe  [id number] [place] [address]     Dear Colleague,    Thank you for referring your patient, Jaswinder Monroe, to the [facility] [place] at [facility]. Please see a copy of my visit note below.    Jaswinder is a 65 year old who is being evaluated via a billable video visit.      How would you like to obtain your AVS? MyChart  If the video visit is dropped, the invitation should be resent by: Send to e-mail at: [email or web address]  Will anyone else be joining your video visit? No    Video Start Time: 3:18 PM    Assessment & Plan   64 yo M with elevated PSA on routine screening, up to 4.75, with FH of prostate cancer in his father. 34% risk of high grade prostate cancer on biopsy based PBCG. Discussed that PSA is a screening test for prostate cancer and that prostate MRI is a tool that can be used to risk stratify. If prostate MRI is concerning would recommend prostate biopsy. If it is not concerning, can follow up in 6 months with repeat PSA and if continues to rise would recommend biopsy due to risk of false negative MRI    Prostate MRI   If PIRADS 3, 4, or 5, plan for MRI TRUS fusion biopsy (Uronav)  If PIRADS 1 or 2, plan to recheck PSA in 6 months.      Kevin Morales MD  [facility] [place]    Rosy San is a 65 year old who presents for the following health issues: elevated PSA    HPI   64 yo M with elevated PSA on routine screening, up to 4.75. denies bothersome urinary symptoms but does have some nocturia 1-3x, some slow stream. No blood in urine.     Father with prostate cancer. Diagnosed in his 60's. Had a prostatectomy     Never smoker. No other significant health issues. Had a vasectomy 30 years ago.     Review of Systems   Constitutional, HEENT, cardiovascular, pulmonary, gi and gu systems are negative, except as otherwise noted.      Objective       Vitals:  No vitals  were obtained today due to virtual visit.    Physical Exam   GENERAL: Healthy, alert and no distress  EYES: Eyes grossly normal to inspection.  No discharge or erythema, or obvious scleral/conjunctival abnormalities.  RESP: No audible wheeze, cough, or visible cyanosis.  No visible retractions or increased work of breathing.    SKIN: Visible skin clear. No significant rash, abnormal pigmentation or lesions.  NEURO: Cranial nerves grossly intact.  Mentation and speech appropriate for age.  PSYCH: Mentation appears normal, affect normal/bright, judgement and insight intact, normal speech and appearance well-groomed.      Component PSA   Latest Ref Rng & Units 0 - 4 ug/L   11/1/2017 4.08 (H)   11/8/2018 3.71   11/13/2019 3.80   11/3/2020 4.75 (H)   12/2/2020 4.54 (H)     Video-Visit Details    Type of service:  Video Visit    Video End Time:3:31 PM    Originating Location (pt. Location): Home    Distant Location (provider location):  [facility]OLOG[facility] [place] Platform used for Video Visit: Mell

## 2021-02-22 NOTE — PROGRESS NOTES
Jaswinder is a 65 year old who is being evaluated via a billable video visit.      How would you like to obtain your AVS? MyChart  If the video visit is dropped, the invitation should be resent by: Send to e-mail at: [email or web address]  Will anyone else be joining your video visit? No    Video Start Time: 3:18 PM    Assessment & Plan   66 yo M with elevated PSA on routine screening, up to 4.75, with FH of prostate cancer in his father. 34% risk of high grade prostate cancer on biopsy based PBCG. Discussed that PSA is a screening test for prostate cancer and that prostate MRI is a tool that can be used to risk stratify. If prostate MRI is concerning would recommend prostate biopsy. If it is not concerning, can follow up in 6 months with repeat PSA and if continues to rise would recommend biopsy due to risk of false negative MRI    Prostate MRI   If PIRADS 3, 4, or 5, plan for MRI TRUS fusion biopsy (Uronav)  If PIRADS 1 or 2, plan to recheck PSA in 6 months.      Kevin Morales MD  [facility]OLOG[facility] [place]    Rosy San is a 65 year old who presents for the following health issues: elevated PSA    HPI   66 yo M with elevated PSA on routine screening, up to 4.75. denies bothersome urinary symptoms but does have some nocturia 1-3x, some slow stream. No blood in urine.     Father with prostate cancer. Diagnosed in his 60's. Had a prostatectomy     Never smoker. No other significant health issues. Had a vasectomy 30 years ago.     Review of Systems   Constitutional, HEENT, cardiovascular, pulmonary, gi and gu systems are negative, except as otherwise noted.      Objective           Vitals:  No vitals were obtained today due to virtual visit.    Physical Exam   GENERAL: Healthy, alert and no distress  EYES: Eyes grossly normal to inspection.  No discharge or erythema, or obvious scleral/conjunctival abnormalities.  RESP: No audible wheeze, cough, or visible cyanosis.  No visible retractions or  increased work of breathing.    SKIN: Visible skin clear. No significant rash, abnormal pigmentation or lesions.  NEURO: Cranial nerves grossly intact.  Mentation and speech appropriate for age.  PSYCH: Mentation appears normal, affect normal/bright, judgement and insight intact, normal speech and appearance well-groomed.      Component PSA   Latest Ref Rng & Units 0 - 4 ug/L   11/1/2017 4.08 (H)   11/8/2018 3.71   11/13/2019 3.80   11/3/2020 4.75 (H)   12/2/2020 4.54 (H)             Video-Visit Details    Type of service:  Video Visit    Video End Time:3:31 PM    Originating Location (pt. Location): Home    Distant Location (provider location):  [facility]OLOGY [facility]IN[facility] [place]     Platform used for Video Visit: Mell

## 2021-03-23 ENCOUNTER — HOSPITAL ENCOUNTER (OUTPATIENT)
Dept: MRI IMAGING | Facility: CLINIC | Age: 66
Discharge: HOME | End: 2021-03-23
Attending: STUDENT IN AN ORGANIZED HEALTH CARE EDUCATION/TRAINING PROGRAM | Admitting: STUDENT IN AN ORGANIZED HEALTH CARE EDUCATION/TRAINING PROGRAM
Payer: COMMERCIAL

## 2021-03-23 DIAGNOSIS — R97.20 ELEVATED PROSTATE SPECIFIC ANTIGEN (PSA): ICD-10-CM

## 2021-03-23 PROCEDURE — 72197 MRI PELVIS W/O & W/DYE: CPT

## 2021-03-23 PROCEDURE — 255N000002 HC RX 255 OP 636: Performed by: STUDENT IN AN ORGANIZED HEALTH CARE EDUCATION/TRAINING PROGRAM

## 2021-03-23 PROCEDURE — 72197 MRI PELVIS W/O & W/DYE: CPT | Mod: 26 | Performed by: RADIOLOGY

## 2021-03-23 PROCEDURE — A9585 GADOBUTROL INJECTION: HCPCS | Performed by: STUDENT IN AN ORGANIZED HEALTH CARE EDUCATION/TRAINING PROGRAM

## 2021-03-23 RX ORDER — GADOBUTROL 604.72 MG/ML
10 INJECTION INTRAVENOUS ONCE
Status: COMPLETED | OUTPATIENT
Start: 2021-03-23 | End: 2021-03-23

## 2021-03-23 RX ADMIN — GADOBUTROL 10 ML: 604.72 INJECTION INTRAVENOUS at 16:07

## 2021-07-26 ENCOUNTER — TELEPHONE (OUTPATIENT)
Dept: UROLOGY | Facility: CLINIC | Age: 66
End: 2021-07-26

## 2021-07-26 DIAGNOSIS — R97.20 ELEVATED PROSTATE SPECIFIC ANTIGEN (PSA): Primary | ICD-10-CM

## 2021-07-26 NOTE — TELEPHONE ENCOUNTER
[facility] Call Center    Phone Message    May a detailed message be left on voicemail: yes     Reason for Call: Other: Pt scheduled for 6 month f/u with  on 10/18. he is wondering if he needs a PSA done prior to the appt. There are no orders placed, so please place PSA orders if pt needs to get that done and give him a call back to let him know.     Action Taken: Message routed to:  [facility] ([facility] uro    Travel Screening: Not Applicable

## 2021-07-26 NOTE — TELEPHONE ENCOUNTER
Per previous patient does need PSA,pt given information for lab appt and orders are entered.  Suzanne Alexander LPN

## 2021-09-05 ENCOUNTER — HEALTH MAINTENANCE LETTER (OUTPATIENT)
Age: 66
End: 2021-09-05

## 2021-10-14 ENCOUNTER — LAB (OUTPATIENT)
Dept: LAB | Facility: CLINIC | Age: 66
End: 2021-10-14
Payer: COMMERCIAL

## 2021-10-14 ENCOUNTER — TELEPHONE (OUTPATIENT)
Dept: UROLOGY | Facility: CLINIC | Age: 66
End: 2021-10-14

## 2021-10-14 DIAGNOSIS — R97.20 ELEVATED PROSTATE SPECIFIC ANTIGEN (PSA): ICD-10-CM

## 2021-10-14 PROCEDURE — 84153 ASSAY OF PSA TOTAL: CPT

## 2021-10-14 PROCEDURE — 36415 COLL VENOUS BLD VENIPUNCTURE: CPT

## 2021-10-14 NOTE — TELEPHONE ENCOUNTER
----- Message from Lisa Gill, EMT sent at 10/13/2021  3:35 PM CDT -----  Regarding: PSA needed  This patient needs a PSA prior to his appt with Dr. Morales Monday 10/18.  Please schedule.    Thanks,    Lisa

## 2021-10-15 LAB — PSA SERPL-MCNC: 4.92 UG/L (ref 0–4)

## 2021-10-18 ENCOUNTER — OFFICE VISIT (OUTPATIENT)
Dept: UROLOGY | Facility: CLINIC | Age: 66
End: 2021-10-18
Payer: COMMERCIAL

## 2021-10-18 VITALS
SYSTOLIC BLOOD PRESSURE: 112 MMHG | HEIGHT: 74 IN | WEIGHT: 227 LBS | BODY MASS INDEX: 29.13 KG/M2 | DIASTOLIC BLOOD PRESSURE: 70 MMHG

## 2021-10-18 DIAGNOSIS — Z80.42 FAMILY HISTORY OF PROSTATE CANCER IN FATHER: ICD-10-CM

## 2021-10-18 DIAGNOSIS — N40.1 BENIGN PROSTATIC HYPERPLASIA WITH WEAK URINARY STREAM: ICD-10-CM

## 2021-10-18 DIAGNOSIS — R39.12 BENIGN PROSTATIC HYPERPLASIA WITH WEAK URINARY STREAM: ICD-10-CM

## 2021-10-18 DIAGNOSIS — R97.20 ELEVATED PROSTATE SPECIFIC ANTIGEN (PSA): Primary | ICD-10-CM

## 2021-10-18 LAB
ALBUMIN UR-MCNC: ABNORMAL MG/DL
APPEARANCE UR: CLEAR
BILIRUB UR QL STRIP: NEGATIVE
COLOR UR AUTO: ABNORMAL
GLUCOSE UR STRIP-MCNC: NEGATIVE MG/DL
HGB UR QL STRIP: NEGATIVE
KETONES UR STRIP-MCNC: ABNORMAL MG/DL
LEUKOCYTE ESTERASE UR QL STRIP: NEGATIVE
NITRATE UR QL: NEGATIVE
PH UR STRIP: 6 [PH] (ref 5–7)
RESIDUAL VOLUME (RV) (EXTERNAL): 25
SP GR UR STRIP: >=1.03 (ref 1–1.03)
UROBILINOGEN UR STRIP-ACNC: 0.2 E.U./DL

## 2021-10-18 PROCEDURE — 99213 OFFICE O/P EST LOW 20 MIN: CPT | Mod: 25 | Performed by: STUDENT IN AN ORGANIZED HEALTH CARE EDUCATION/TRAINING PROGRAM

## 2021-10-18 PROCEDURE — 81003 URINALYSIS AUTO W/O SCOPE: CPT | Mod: QW | Performed by: STUDENT IN AN ORGANIZED HEALTH CARE EDUCATION/TRAINING PROGRAM

## 2021-10-18 PROCEDURE — 51798 US URINE CAPACITY MEASURE: CPT | Performed by: STUDENT IN AN ORGANIZED HEALTH CARE EDUCATION/TRAINING PROGRAM

## 2021-10-18 ASSESSMENT — MIFFLIN-ST. JEOR: SCORE: 1889.42

## 2021-10-18 ASSESSMENT — PAIN SCALES - GENERAL: PAINLEVEL: NO PAIN (0)

## 2021-10-18 NOTE — LETTER
"10/18/2021       RE: Jaswinder Monroe  [id number] [place] [address]     Dear Colleague,    Thank you for referring your patient, Jaswinder Monroe, to the [facility] [place] at [facility]. Please see a copy of my visit note below.    CHIEF COMPLAINT   Jaswinder Monroe who is a 66 year old male returns today for follow-up of elevated PSA, + FH prostate cancer in father    HPI   Jaswinder Monroe is a 66 year old male who presents with a history of elevated PSA, + FH prostate cancer in father     Prostate MRI 3/2021 PIRADS 2, with large 87 gram prostate    Today PSA slightly higher than previously at 4.92 ng/ml    AUA symptom score 9-8-5-1-4-0-2 = 8 moderate QOL 1 pleased    PHYSICAL EXAM  Patient is a 66 year old  male   Vitals: Blood pressure 112/70, height 1.88 m (6' 2\"), weight 103 kg (227 lb).  Body mass index is 29.15 kg/m .  General Appearance Adult:   Alert, no acute distress, oriented  HENT: throat/mouth:normal, good dentition  Lungs: no respiratory distress, or pursed lip breathing  Heart: No obvious jugular venous distension present  Abdomen: soft, nontender, no organomegaly or masses  Musculoskeltal: extremities normal, no peripheral edema  Skin: no suspicious lesions or rashes  Neuro: Alert, oriented, speech and mentation normal  Psych: affect and mood normal  Gait: Normal  : large benign feeling prostate, tight sphincter tone    All pertinent imaging reviewed:    MRI prostate 3/23/2021  Size: 6 x 4.8 x 5.8 cm; 87 grams  IMPRESSION:  1. Based on the most suspicious abnormality, this exam is  characterized as PIRADS 2 - Clinically significant cancer is unlikely  to be present.    2. No suspicious adenopathy or evidence of pelvic metastases.    PVR 25 ml today    Component PSA   Latest Ref Rng & Units 0.00 - 4.00 ug/L   11/1/2017 4.08 (H)   11/8/2018 3.71   11/13/2019 3.80   11/3/2020 4.75 (H)   12/2/2020 4.54 (H)   10/14/2021 " 4.92 (H)     Results for LEILA PERZE (MRN [id number]) as of 10/18/2021 09:18   Ref. Range 10/18/2021 09:02   Color Urine Latest Ref Range: Colorless, Straw, Light Yellow, Yellow  Dark Yellow (A)   Appearance Urine Latest Ref Range: Clear  Clear   Glucose Urine Latest Ref Range: Negative mg/dL Negative   Bilirubin Urine Latest Ref Range: Negative  Negative   Ketones Urine Latest Ref Range: Negative mg/dL Trace (A)   Specific Gravity Urine Latest Ref Range: 1.003 - 1.035  >=1.030   pH Urine Latest Ref Range: 5.0 - 7.0  6.0   Protein Albumin Urine Latest Ref Range: Negative mg/dL Trace (A)   Urobilinogen Urine Latest Ref Range: 0.2, 1.0 E.U./dL 0.2   Nitrite Urine Latest Ref Range: Negative  Negative   Blood Urine Latest Ref Range: Negative  Negative   Leukocyte Esterase Urine Latest Ref Range: Negative  Negative       ASSESSMENT and PLAN  66 year old male who presents with a history of elevated PSA, + FH prostate cancer in father, BPH with weak stream    Declines medical therapy for BPH with weak stream at this time    PSA slightly higher than previously but not concerning rise. PSAD 0.06 is low. MRI was PIRADS 2. Will continue to monitor PSA and reimage as indicated.    6 months with PSA prior, with PVR, AUA Symptom Score    Kevin Morales MD   [facility]  [facility] Phone: [phone]

## 2021-10-18 NOTE — PROGRESS NOTES
"CHIEF COMPLAINT   Leila Monroe who is a 66 year old male returns today for follow-up of elevated PSA, + FH prostate cancer in father    HPI   Leila Monroe is a 66 year old male who presents with a history of elevated PSA, + FH prostate cancer in father     Prostate MRI 3/2021 PIRADS 2, with large 87 gram prostate    Today PSA slightly higher than previously at 4.92 ng/ml    AUA symptom score 2-9-1-1-4-0-2 = 8 moderate QOL 1 pleased    PHYSICAL EXAM  Patient is a 66 year old  male   Vitals: Blood pressure 112/70, height 1.88 m (6' 2\"), weight 103 kg (227 lb).  Body mass index is 29.15 kg/m .  General Appearance Adult:   Alert, no acute distress, oriented  HENT: throat/mouth:normal, good dentition  Lungs: no respiratory distress, or pursed lip breathing  Heart: No obvious jugular venous distension present  Abdomen: soft, nontender, no organomegaly or masses  Musculoskeltal: extremities normal, no peripheral edema  Skin: no suspicious lesions or rashes  Neuro: Alert, oriented, speech and mentation normal  Psych: affect and mood normal  Gait: Normal  : large benign feeling prostate, tight sphincter tone    All pertinent imaging reviewed:    MRI prostate 3/23/2021  Size: 6 x 4.8 x 5.8 cm; 87 grams  IMPRESSION:  1. Based on the most suspicious abnormality, this exam is  characterized as PIRADS 2 - Clinically significant cancer is unlikely  to be present.    2. No suspicious adenopathy or evidence of pelvic metastases.    PVR 25 ml today    Component PSA   Latest Ref Rng & Units 0.00 - 4.00 ug/L   11/1/2017 4.08 (H)   11/8/2018 3.71   11/13/2019 3.80   11/3/2020 4.75 (H)   12/2/2020 4.54 (H)   10/14/2021 4.92 (H)     Results for LEILA MONROE (MRN [id number]) as of 10/18/2021 09:18   Ref. Range 10/18/2021 09:02   Color Urine Latest Ref Range: Colorless, Straw, Light Yellow, Yellow  Dark Yellow (A)   Appearance Urine Latest Ref Range: Clear  Clear   Glucose Urine Latest Ref Range: Negative mg/dL Negative "   Bilirubin Urine Latest Ref Range: Negative  Negative   Ketones Urine Latest Ref Range: Negative mg/dL Trace (A)   Specific Gravity Urine Latest Ref Range: 1.003 - 1.035  >=1.030   pH Urine Latest Ref Range: 5.0 - 7.0  6.0   Protein Albumin Urine Latest Ref Range: Negative mg/dL Trace (A)   Urobilinogen Urine Latest Ref Range: 0.2, 1.0 E.U./dL 0.2   Nitrite Urine Latest Ref Range: Negative  Negative   Blood Urine Latest Ref Range: Negative  Negative   Leukocyte Esterase Urine Latest Ref Range: Negative  Negative       ASSESSMENT and PLAN  66 year old male who presents with a history of elevated PSA, + FH prostate cancer in father, BPH with weak stream    Declines medical therapy for BPH with weak stream at this time    PSA slightly higher than previously but not concerning rise. PSAD 0.06 is low. MRI was PIRADS 2. Will continue to monitor PSA and reimage as indicated.    6 months with PSA prior, with PVR, AUA Symptom Score    Kevin Morales MD   [facility]  [facility] Phone: [phone]

## 2021-11-04 ENCOUNTER — OFFICE VISIT (OUTPATIENT)
Dept: INTERNAL MEDICINE | Facility: CLINIC | Age: 66
End: 2021-11-04
Payer: COMMERCIAL

## 2021-11-04 VITALS
TEMPERATURE: 97.3 F | RESPIRATION RATE: 16 BRPM | SYSTOLIC BLOOD PRESSURE: 124 MMHG | BODY MASS INDEX: 29.99 KG/M2 | OXYGEN SATURATION: 96 % | HEART RATE: 72 BPM | HEIGHT: 74 IN | DIASTOLIC BLOOD PRESSURE: 76 MMHG | WEIGHT: 233.7 LBS

## 2021-11-04 DIAGNOSIS — E78.2 MIXED HYPERLIPIDEMIA: ICD-10-CM

## 2021-11-04 DIAGNOSIS — Z00.00 ROUTINE GENERAL MEDICAL EXAMINATION AT A HEALTH CARE FACILITY: Primary | ICD-10-CM

## 2021-11-04 DIAGNOSIS — Z13.1 SCREENING FOR DIABETES MELLITUS: ICD-10-CM

## 2021-11-04 DIAGNOSIS — R23.8 PAPULE OF SKIN: ICD-10-CM

## 2021-11-04 DIAGNOSIS — R97.20 ELEVATED PROSTATE SPECIFIC ANTIGEN (PSA): ICD-10-CM

## 2021-11-04 LAB
ANION GAP SERPL CALCULATED.3IONS-SCNC: 8 MMOL/L (ref 3–14)
BUN SERPL-MCNC: 20 MG/DL (ref 7–30)
CALCIUM SERPL-MCNC: 8.9 MG/DL (ref 8.5–10.1)
CHLORIDE BLD-SCNC: 108 MMOL/L (ref 94–109)
CHOLEST SERPL-MCNC: 164 MG/DL
CO2 SERPL-SCNC: 22 MMOL/L (ref 20–32)
CREAT SERPL-MCNC: 0.98 MG/DL (ref 0.66–1.25)
FASTING STATUS PATIENT QL REPORTED: YES
GFR SERPL CREATININE-BSD FRML MDRD: 81 ML/MIN/1.73M2
GLUCOSE BLD-MCNC: 95 MG/DL (ref 70–99)
HDLC SERPL-MCNC: 41 MG/DL
LDLC SERPL CALC-MCNC: 93 MG/DL
NONHDLC SERPL-MCNC: 123 MG/DL
POTASSIUM BLD-SCNC: 3.9 MMOL/L (ref 3.4–5.3)
SODIUM SERPL-SCNC: 138 MMOL/L (ref 133–144)
TRIGL SERPL-MCNC: 148 MG/DL

## 2021-11-04 PROCEDURE — 99396 PREV VISIT EST AGE 40-64: CPT | Performed by: INTERNAL MEDICINE

## 2021-11-04 PROCEDURE — 80048 BASIC METABOLIC PNL TOTAL CA: CPT | Performed by: INTERNAL MEDICINE

## 2021-11-04 PROCEDURE — 80061 LIPID PANEL: CPT | Performed by: INTERNAL MEDICINE

## 2021-11-04 PROCEDURE — 36415 COLL VENOUS BLD VENIPUNCTURE: CPT | Performed by: INTERNAL MEDICINE

## 2021-11-04 RX ORDER — SIMVASTATIN 20 MG
20 TABLET ORAL AT BEDTIME
Qty: 90 TABLET | Refills: 3 | Status: SHIPPED | OUTPATIENT
Start: 2021-11-04 | End: 2022-11-17

## 2021-11-04 ASSESSMENT — ENCOUNTER SYMPTOMS
FREQUENCY: 0
DIARRHEA: 0
SORE THROAT: 0
WEAKNESS: 0
HEMATOCHEZIA: 0
CONSTIPATION: 0
FEVER: 0
HEADACHES: 0
MYALGIAS: 0
NERVOUS/ANXIOUS: 0
DIZZINESS: 0
ARTHRALGIAS: 0
ABDOMINAL PAIN: 0
NAUSEA: 0
HEMATURIA: 0
EYE PAIN: 0
DYSURIA: 0
COUGH: 0
SHORTNESS OF BREATH: 0
HEARTBURN: 0
CHILLS: 0
PARESTHESIAS: 0
PALPITATIONS: 0
JOINT SWELLING: 0

## 2021-11-04 ASSESSMENT — MIFFLIN-ST. JEOR: SCORE: 1919.81

## 2021-11-04 NOTE — PROGRESS NOTES
SUBJECTIVE:   CC: Jaswinder Monroe is an 66 year old male who presents for preventative health visit.       Patient has been advised of split billing requirements and indicates understanding: Yes  Healthy Habits:     Getting at least 3 servings of Calcium per day:  Yes    Bi-annual eye exam:  Yes    Dental care twice a year:  Yes    Sleep apnea or symptoms of sleep apnea:  None    Diet:  Regular (no restrictions)    Frequency of exercise:  4-5 days/week    Duration of exercise:  45-60 minutes    Taking medications regularly:  Yes    Medication side effects:  None    PHQ-2 Total Score: 0    Additional concerns today:  No        Today's PHQ-2 Score:   PHQ-2 ( 1999 [employer]) 11/4/2021   Q1: Little interest or pleasure in doing things 0   Q2: Feeling down, depressed or hopeless 0   PHQ-2 Score 0   Q1: Little interest or pleasure in doing things Not at all   Q2: Feeling down, depressed or hopeless Not at all   PHQ-2 Score 0       Abuse: Current or Past(Physical, Sexual or Emotional)- NO  Do you feel safe in your environment? YES        Social History     Tobacco Use     Smoking status: Never Smoker     Smokeless tobacco: Never Used   Substance Use Topics     Alcohol use: Yes     Alcohol/week: 4.0 standard drinks     Types: 4 Standard drinks or equivalent per week     Comment: social         Alcohol Use 11/4/2021   Prescreen: >3 drinks/day or >7 drinks/week? No   Prescreen: >3 drinks/day or >7 drinks/week? -       Last PSA:   PSA   Date Value Ref Range Status   12/02/2020 4.54 (H) 0 - 4 ug/L Final     Comment:     Assay Method:  Chemiluminescence using Siemens Vista analyzer     PSA Tumor Marker   Date Value Ref Range Status   10/14/2021 4.92 (H) 0.00 - 4.00 ug/L Final       Reviewed orders with patient. Reviewed health maintenance and updated orders accordingly - Yes      Reviewed and updated as needed this visit by clinical staff  Tobacco  Allergies  Meds   Med Hx  Surg Hx  Fam Hx  Soc Hx        Reviewed and updated  "as needed this visit by Provider  Tobacco                   Review of Systems   Constitutional: Negative for chills and fever.   HENT: Negative for congestion, ear pain, hearing loss and sore throat.    Eyes: Negative for pain and visual disturbance.   Respiratory: Negative for cough and shortness of breath.    Cardiovascular: Negative for chest pain, palpitations and peripheral edema.   Gastrointestinal: Negative for abdominal pain, constipation, diarrhea, heartburn, hematochezia and nausea.   Genitourinary: Negative for discharge, dysuria, frequency, genital sores, hematuria, impotence and urgency.   Musculoskeletal: Negative for arthralgias, joint swelling and myalgias.   Skin: Negative for rash.   Neurological: Negative for dizziness, weakness, headaches and paresthesias.   Psychiatric/Behavioral: Negative for mood changes. The patient is not nervous/anxious.        OBJECTIVE:   /76   Pulse 72   Temp 97.3  F (36.3  C) (Temporal)   Resp 16   Ht 1.88 m (6' 2\")   Wt 106 kg (233 lb 11.2 oz)   SpO2 96%   BMI 30.01 kg/m      Physical Exam  GENERAL: healthy, alert and no distress  EYES: Eyes grossly normal to inspection, PERRL and conjunctivae and sclerae normal  HENT: ear canals and TM's normal, nose and mouth without ulcers or lesions  NECK: no adenopathy, no asymmetry, masses, or scars and thyroid normal to palpation  RESP: lungs clear to auscultation - no rales, rhonchi or wheezes  CV: regular rate and rhythm, normal S1 S2, no S3 or S4, no murmur, click or rub, no peripheral edema and peripheral pulses strong  ABDOMEN: soft, nontender, no hepatosplenomegaly, no masses and bowel sounds normal  MS: no gross musculoskeletal defects noted, no edema  SKIN: a few papules on face, one excoriated R cheek   NEURO: Normal strength and tone, mentation intact and speech normal  PSYCH: mentation appears normal, affect normal/bright    Labs reviewed in Epic    ASSESSMENT/PLAN:   (Z00.00) Routine general medical " "examination at a health care facility  (primary encounter diagnosis)  Plan: Updated screening, immunizations, prevention.  Please see health maintenance list, care gaps   Defers JJ covid booster    Elevated PSA/ BPH  F/u with urology for elevated PSA/BPH- no high risk areas on MRI. PSA being monitored    (E78.2) Mixed hyperlipidemia- 10 yr CV risk 10%  Plan: Lipid Profile          (Z13.1) Screening for diabetes mellitus  Plan: Basic metabolic panel        (R23.8) Papule of skin  Plan: Adult Dermatology Referral          Patient has been advised of split billing requirements and indicates understanding: Yes  COUNSELING:   Reviewed preventive health counseling, as reflected in patient instructions    Estimated body mass index is 30.01 kg/m  as calculated from the following:    Height as of this encounter: 1.88 m (6' 2\").    Weight as of this encounter: 106 kg (233 lb 11.2 oz).     He reports that he has never smoked. He has never used smokeless tobacco.    Counseling Resources:  ATP IV Guidelines  Pooled Cohorts Equation Calculator  FRAX Risk Assessment  ICSI Preventive Guidelines  Dietary Guidelines for Americans, 2010  USDA's MyPlate  ASA Prophylaxis  Lung CA Screening    Antwan Ron MD  [facility]  "

## 2021-11-05 ENCOUNTER — TELEPHONE (OUTPATIENT)
Dept: DERMATOLOGY | Facility: CLINIC | Age: 66
End: 2021-11-05
Payer: COMMERCIAL

## 2021-11-05 NOTE — TELEPHONE ENCOUNTER
[facility] Call Center    Phone Message    May a detailed message be left on voicemail: yes     Reason for Call: Other: Pt called and wanted to schedule an appointment off his referral for Papule of skin, per protcol i do not see that and could not call back line as clinic was closed. Please call Pt at [phone] to further discuss. Thank you.     Action Taken: Message routed to:  Other: [facility]    Travel Screening: Not Applicable

## 2021-11-08 NOTE — TELEPHONE ENCOUNTER
Called and spoke to patient.    Patient referred to Dermatology by Dr. Ron for lesion on right cheek.    Scheduled next available appointment w/ Dr. Moore.    Patient voiced understanding.    ARIANNA Dacosta-BSN-PHN  [facility]  [phone]

## 2022-01-06 ENCOUNTER — OFFICE VISIT (OUTPATIENT)
Dept: DERMATOLOGY | Facility: CLINIC | Age: 67
End: 2022-01-06
Payer: COMMERCIAL

## 2022-01-06 VITALS — DIASTOLIC BLOOD PRESSURE: 76 MMHG | HEART RATE: 75 BPM | SYSTOLIC BLOOD PRESSURE: 119 MMHG | OXYGEN SATURATION: 98 %

## 2022-01-06 DIAGNOSIS — R23.8 PAPULE OF SKIN: ICD-10-CM

## 2022-01-06 DIAGNOSIS — D17.24 LIPOMA OF LEFT LOWER EXTREMITY: ICD-10-CM

## 2022-01-06 DIAGNOSIS — D23.9 DERMAL NEVUS: ICD-10-CM

## 2022-01-06 DIAGNOSIS — L82.1 SEBORRHEIC KERATOSIS: ICD-10-CM

## 2022-01-06 DIAGNOSIS — D18.01 ANGIOMA OF SKIN: ICD-10-CM

## 2022-01-06 DIAGNOSIS — L81.4 LENTIGO: Primary | ICD-10-CM

## 2022-01-06 PROCEDURE — 99243 OFF/OP CNSLTJ NEW/EST LOW 30: CPT | Performed by: DERMATOLOGY

## 2022-01-06 NOTE — PROGRESS NOTES
Jaswinder Monroe , a 66 year old year old male patient, I was asked to see by Dr. Musa for spot on left knee and r cheek.  Patient states this has been present for a while.  Patient reports the following symptoms:  none .  Patient reports the following previous treatments none.  Patient reports the following modifying factors none.  Associated symptoms: none.  Patient has no other skin complaints today.  Remainder of the HPI, Meds, PMH, Allergies, FH, and SH was reviewed in chart.      Past Medical History:   Diagnosis Date     Fibula fracture     Left     Mixed hyperlipidemia- 10 yr CV risk 10% 11/1/2017     Mumps        Past Surgical History:   Procedure Laterality Date     COLONOSCOPY N/A 02/16/2018    Procedure: COLONOSCOPY;  colonoscopy;  Surgeon: Александр Madrid MD;  Location: [facility]     VASECTOMY          Family History   Problem Relation Age of Onset     Breast Cancer Mother      Alzheimer Disease Father      Prostate Cancer Father 60     Thyroid Disease Sister      Breast Cancer Maternal Grandmother      Colon Cancer No family hx of        Social History     Socioeconomic History     Marital status:      Spouse name: Not on file     Number of children: 4     Years of education: Not on file     Highest education level: Not on file   Occupational History     Employer: [employer]   Tobacco Use     Smoking status: Never Smoker     Smokeless tobacco: Never Used   Substance and Sexual Activity     Alcohol use: Yes     Alcohol/week: 4.0 standard drinks     Types: 4 Standard drinks or equivalent per week     Comment: social     Drug use: No     Sexual activity: Yes     Partners: Female   Other Topics Concern     Parent/sibling w/ CABG, MI or angioplasty before 65F 55M? Not Asked   Social History Narrative     Not on file     Social Determinants of Health     Financial Resource Strain: Not on file   Food Insecurity: Not on file   Transportation Needs: Not on file   Physical Activity: Not on file   Stress: Not on  "file   Social Connections: Not on file   Intimate Partner Violence: Not on file   Housing Stability: Not on file       Outpatient Encounter Medications as of 1/6/2022   Medication Sig Dispense Refill     Cholecalciferol (VITAMIN D3 PO) Take 250 mcg by mouth daily       Multiple Vitamins-Minerals (ZINC PO) Take 50 mg by mouth daily       Nutritional Supplements (NUTRITIONAL SUPPLEMENT PO) Balance of nature  Apple cider vinegar  Dietary supplemeant, \"Release\"  Super beets       POTASSIUM PO Take 99 mg by mouth daily       simvastatin (ZOCOR) 20 MG tablet Take 1 tablet (20 mg) by mouth At Bedtime 90 tablet 3     No facility-administered encounter medications on file as of 1/6/2022.             Review Of Systems  Skin: As above  Eyes: negative  Ears/Nose/Throat: negative  Respiratory: No shortness of breath, dyspnea on exertion, cough, or hemoptysis  Cardiovascular: negative  Gastrointestinal: negative  Genitourinary: negative  Musculoskeletal: negative  Neurologic: negative  Psychiatric: negative  Hematologic/Lymphatic/Immunologic: negative  Endocrine: negative      O:   NAD, WDWN, Alert & Oriented, Mood & Affect wnl, Vitals stable   Here today alone   General appearance      Vitals christina ii   Alert, oriented and in no acute distress      Following lymph nodes palpated: Occipital, Cervical, Supraclavicular no lad   Stuck on papules and brown macules on face  L knee movable nodule  R cheek flesh colored papule  L orbit flesh colored papule   Red papules on scalp   Flesh colored papules on face     The remainder of expanded problem focused exam was normal; the following areas were examined:  scalp/hair, conjunctiva/lids, face, neck, lips      Eyes: Conjunctivae/lids:Normal     ENT: Lips, buccal mucosa, tongue: normal    MSK:Normal    Cardiovascular: peripheral edema none    Pulm: Breathing Normal    Lymph Nodes: No Head and Neck Lymphadenopathy     Neuro/Psych: Orientation:Normal; Mood/Affect:Normal      A/P:  1. " Seborrheic keratosis, lentigo, angioma, dermal nevus, lipoma  It was a pleasure speaking to Jaswinder Monroe today.  Previous clinic  notes and pertinent laboratory tests were reviewed prior to Jaswinder Monroe's visit.    Nature and genetics of benign skin lesions dicussed with patient.  Signs and Symptoms of skin cancer discussed with patient.  Patient encouraged to perform monthly skin exams.  UV precautions reviewed with patient.  Return to clinic 12 months

## 2022-01-06 NOTE — LETTER
1/6/2022         RE: Jaswinder Monroe  [address] [place] [address]        Dear Colleague,    Thank you for referring your patient, Jaswinder Monroe, to the [facility]. Please see a copy of my visit note below.    Jaswinder Monroe , a 66 year old year old male patient, I was asked to see by Dr. Musa for spot on left knee and r cheek.  Patient states this has been present for a while.  Patient reports the following symptoms:  none .  Patient reports the following previous treatments none.  Patient reports the following modifying factors none.  Associated symptoms: none.  Patient has no other skin complaints today.  Remainder of the HPI, Meds, PMH, Allergies, FH, and SH was reviewed in chart.      Past Medical History:   Diagnosis Date     Fibula fracture     Left     Mixed hyperlipidemia- 10 yr CV risk 10% 11/1/2017     Mumps        Past Surgical History:   Procedure Laterality Date     COLONOSCOPY N/A 02/16/2018    Procedure: COLONOSCOPY;  colonoscopy;  Surgeon: Александр Madrid MD;  Location: [facility]     VASECTOMY          Family History   Problem Relation Age of Onset     Breast Cancer Mother      Alzheimer Disease Father      Prostate Cancer Father 60     Thyroid Disease Sister      Breast Cancer Maternal Grandmother      Colon Cancer No family hx of        Social History     Socioeconomic History     Marital status:      Spouse name: Not on file     Number of children: 4     Years of education: Not on file     Highest education level: Not on file   Occupational History     Employer: [employer]   Tobacco Use     Smoking status: Never Smoker     Smokeless tobacco: Never Used   Substance and Sexual Activity     Alcohol use: Yes     Alcohol/week: 4.0 standard drinks     Types: 4 Standard drinks or equivalent per week     Comment: social     Drug use: No     Sexual activity: Yes     Partners: Female   Other Topics Concern     Parent/sibling w/ CABG, MI or angioplasty  "before 65F 55M? Not Asked   Social History Narrative     Not on file     Social Determinants of Health     Financial Resource Strain: Not on file   Food Insecurity: Not on file   Transportation Needs: Not on file   Physical Activity: Not on file   Stress: Not on file   Social Connections: Not on file   Intimate Partner Violence: Not on file   Housing Stability: Not on file       Outpatient Encounter Medications as of 1/6/2022   Medication Sig Dispense Refill     Cholecalciferol (VITAMIN D3 PO) Take 250 mcg by mouth daily       Multiple Vitamins-Minerals (ZINC PO) Take 50 mg by mouth daily       Nutritional Supplements (NUTRITIONAL SUPPLEMENT PO) Balance of nature  Apple cider vinegar  Dietary supplemeant, \"Release\"  Super beets       POTASSIUM PO Take 99 mg by mouth daily       simvastatin (ZOCOR) 20 MG tablet Take 1 tablet (20 mg) by mouth At Bedtime 90 tablet 3     No facility-administered encounter medications on file as of 1/6/2022.             Review Of Systems  Skin: As above  Eyes: negative  Ears/Nose/Throat: negative  Respiratory: No shortness of breath, dyspnea on exertion, cough, or hemoptysis  Cardiovascular: negative  Gastrointestinal: negative  Genitourinary: negative  Musculoskeletal: negative  Neurologic: negative  Psychiatric: negative  Hematologic/Lymphatic/Immunologic: negative  Endocrine: negative      O:   NAD, WDWN, Alert & Oriented, Mood & Affect wnl, Vitals stable   Here today alone   General appearance      Vitals christina ii   Alert, oriented and in no acute distress      Following lymph nodes palpated: Occipital, Cervical, Supraclavicular no lad   Stuck on papules and brown macules on face  L knee movable nodule  R cheek flesh colored papule  L orbit flesh colored papule   Red papules on scalp   Flesh colored papules on face     The remainder of expanded problem focused exam was normal; the following areas were examined:  scalp/hair, conjunctiva/lids, face, neck, lips      Eyes: " Conjunctivae/lids:Normal     ENT: Lips, buccal mucosa, tongue: normal    MSK:Normal    Cardiovascular: peripheral edema none    Pulm: Breathing Normal    Lymph Nodes: No Head and Neck Lymphadenopathy     Neuro/Psych: Orientation:Normal; Mood/Affect:Normal      A/P:  1. Seborrheic keratosis, lentigo, angioma, dermal nevus, lipoma  It was a pleasure speaking to Jaswinder Monroe today.  Previous clinic  notes and pertinent laboratory tests were reviewed prior to Jaswinder Monroe's visit.    Nature and genetics of benign skin lesions dicussed with patient.  Signs and Symptoms of skin cancer discussed with patient.  Patient encouraged to perform monthly skin exams.  UV precautions reviewed with patient.  Return to clinic 12 months        Again, thank you for allowing me to participate in the care of your patient.        Sincerely,        Xavier Moore MD

## 2022-03-23 ENCOUNTER — OFFICE VISIT (OUTPATIENT)
Dept: FAMILY MEDICINE | Facility: CLINIC | Age: 67
End: 2022-03-23
Payer: COMMERCIAL

## 2022-03-23 VITALS — DIASTOLIC BLOOD PRESSURE: 64 MMHG | SYSTOLIC BLOOD PRESSURE: 120 MMHG

## 2022-03-23 DIAGNOSIS — L72.0 EPIDERMOID CYST OF SKIN: Primary | ICD-10-CM

## 2022-03-23 PROCEDURE — 99212 OFFICE O/P EST SF 10 MIN: CPT | Performed by: NURSE PRACTITIONER

## 2022-03-23 NOTE — PROGRESS NOTES
[facility] Dermatology Note  Encounter Date: Mar 23, 2022  Office Visit     Dermatology Problem List:  1. Cyst, left superior knee    ____________________________________________    Assessment & Plan:     # Cyst. Benign, no further treatment needed. Discussed excision of patient is bothered by the lesion due to irritation. Patient prefers to have the lesion removed and is aware of the risk of infection, scar, incomplete removal and recurrence. Photo taken for chart. Patient scheduled follow up with another provider for removal at their convenience at the end of appointment today.      Follow-up: for excision at patients convenience.     Milena Day, APRN CNP on 3/23/2022 at 10:08 AM   _______________________________________    CC: Derm Problem (left thigh cyst)    HPI:  Mr. Jaswinder Monroe is a(n) 66 year old male who presents today as a return patient for evaluation of spot on knee. Patient wants to set up removal,       Patient is otherwise feeling well, without additional skin concerns.      Physical Exam:  Vitals: /64   SKIN: Focused examination of lesion on left knee was performed.  - left superior knee, 2 cm subcutaneous nodule with punctum overlying.     - No other lesions of concern on areas examined.         Medications:  Current Outpatient Medications   Medication     Cholecalciferol (VITAMIN D3 PO)     Multiple Vitamins-Minerals (ZINC PO)     Nutritional Supplements (NUTRITIONAL SUPPLEMENT PO)     POTASSIUM PO     simvastatin (ZOCOR) 20 MG tablet     No current facility-administered medications for this visit.      Past Medical History:   Patient Active Problem List   Diagnosis     Mixed hyperlipidemia- 10 yr CV risk 10%     Elevated prostate specific antigen (PSA)     Past Medical History:   Diagnosis Date     Fibula fracture     Left     Mixed hyperlipidemia- 10 yr CV risk 10% 11/1/2017     Mumps        CC Antwan Ron MD  [address]   [address]  [place]  [address] on close of this encounter.

## 2022-03-23 NOTE — LETTER
3/23/2022         RE: Jaswinder Monroe  [address] [place] [address]        Dear Colleague,    [facility] you [facility]. Please see a copy of my visit note below.    [facility] Dermatology Note  Encounter Date: Mar 23, 2022  Office Visit     Dermatology Problem List:  1. Cyst, left superior knee    ____________________________________________    Assessment & Plan:     # Cyst. Benign, no further treatment needed. Discussed excision of patient is bothered by the lesion due to irritation. Patient prefers to have the lesion removed and is aware of the risk of infection, scar, incomplete removal and recurrence. Photo taken for chart. Patient scheduled follow up with another provider for removal at their convenience at the end of appointment today.      Follow-up: for excision at patients convenience.     Milena Day, APRN CNP on 3/23/2022 at 10:08 AM   _______________________________________    CC: Derm Problem (left thigh cyst)    HPI:  Mr. Jaswinder Monroe is a(n) 66 year old male who presents today as a return patient for evaluation of spot on knee. Patient wants to set up removal,       Patient is otherwise feeling well, without additional skin concerns.      Physical Exam:  Vitals: /64   SKIN: Focused examination of lesion on left knee was performed.  - left superior knee, 2 cm subcutaneous nodule with punctum overlying.     - No other lesions of concern on areas examined.         Medications:  Current Outpatient Medications   Medication     Cholecalciferol (VITAMIN D3 PO)     Multiple Vitamins-Minerals (ZINC PO)     Nutritional Supplements (NUTRITIONAL SUPPLEMENT PO)     POTASSIUM PO     simvastatin (ZOCOR) 20 MG tablet     No current facility-administered medications for this visit.      Past Medical History:   Patient Active Problem List   Diagnosis     Mixed hyperlipidemia- 10 yr CV risk 10%      Elevated prostate specific antigen (PSA)     Past Medical History:   Diagnosis Date     Fibula fracture     Left     Mixed hyperlipidemia- 10 yr CV risk 10% 11/1/2017     Mumps        CC Antwan Ron MD  [address]   [address]  [place] [address] on close of this encounter.      Again, thank you for allowing me to participate in the care of your patient.        Sincerely,        LEO Metz CNP

## 2022-04-04 ENCOUNTER — DOCUMENTATION ONLY (OUTPATIENT)
Dept: LAB | Facility: CLINIC | Age: 67
End: 2022-04-04
Payer: COMMERCIAL

## 2022-04-04 DIAGNOSIS — R97.20 ELEVATED PROSTATE SPECIFIC ANTIGEN (PSA): Primary | ICD-10-CM

## 2022-04-13 ENCOUNTER — LAB (OUTPATIENT)
Dept: LAB | Facility: CLINIC | Age: 67
End: 2022-04-13
Payer: COMMERCIAL

## 2022-04-13 DIAGNOSIS — R97.20 ELEVATED PROSTATE SPECIFIC ANTIGEN (PSA): ICD-10-CM

## 2022-04-13 LAB — PSA SERPL-MCNC: 6 UG/L (ref 0–4)

## 2022-04-13 PROCEDURE — 36415 COLL VENOUS BLD VENIPUNCTURE: CPT

## 2022-04-13 PROCEDURE — 84153 ASSAY OF PSA TOTAL: CPT

## 2022-04-15 DIAGNOSIS — R39.12 BENIGN PROSTATIC HYPERPLASIA WITH WEAK URINARY STREAM: Primary | ICD-10-CM

## 2022-04-15 DIAGNOSIS — N40.1 BENIGN PROSTATIC HYPERPLASIA WITH WEAK URINARY STREAM: Primary | ICD-10-CM

## 2022-04-19 ENCOUNTER — OFFICE VISIT (OUTPATIENT)
Dept: UROLOGY | Facility: CLINIC | Age: 67
End: 2022-04-19
Payer: COMMERCIAL

## 2022-04-19 VITALS
WEIGHT: 230 LBS | SYSTOLIC BLOOD PRESSURE: 100 MMHG | DIASTOLIC BLOOD PRESSURE: 64 MMHG | HEIGHT: 74 IN | BODY MASS INDEX: 29.52 KG/M2

## 2022-04-19 DIAGNOSIS — R97.20 ELEVATED PROSTATE SPECIFIC ANTIGEN (PSA): Primary | ICD-10-CM

## 2022-04-19 DIAGNOSIS — Z80.42 FAMILY HISTORY OF PROSTATE CANCER IN FATHER: ICD-10-CM

## 2022-04-19 DIAGNOSIS — N40.1 BENIGN PROSTATIC HYPERPLASIA WITH WEAK URINARY STREAM: ICD-10-CM

## 2022-04-19 DIAGNOSIS — R39.12 BENIGN PROSTATIC HYPERPLASIA WITH WEAK URINARY STREAM: ICD-10-CM

## 2022-04-19 LAB
ALBUMIN UR-MCNC: NEGATIVE MG/DL
APPEARANCE UR: CLEAR
BILIRUB UR QL STRIP: NEGATIVE
COLOR UR AUTO: YELLOW
GLUCOSE UR STRIP-MCNC: NEGATIVE MG/DL
HGB UR QL STRIP: NEGATIVE
KETONES UR STRIP-MCNC: NEGATIVE MG/DL
LEUKOCYTE ESTERASE UR QL STRIP: NEGATIVE
NITRATE UR QL: NEGATIVE
PH UR STRIP: 5.5 [PH] (ref 5–7)
RESIDUAL VOLUME (RV) (EXTERNAL): 10
SP GR UR STRIP: >=1.03 (ref 1–1.03)
UROBILINOGEN UR STRIP-ACNC: 0.2 E.U./DL

## 2022-04-19 PROCEDURE — 51798 US URINE CAPACITY MEASURE: CPT | Performed by: STUDENT IN AN ORGANIZED HEALTH CARE EDUCATION/TRAINING PROGRAM

## 2022-04-19 PROCEDURE — 99213 OFFICE O/P EST LOW 20 MIN: CPT | Mod: 25 | Performed by: STUDENT IN AN ORGANIZED HEALTH CARE EDUCATION/TRAINING PROGRAM

## 2022-04-19 PROCEDURE — 81003 URINALYSIS AUTO W/O SCOPE: CPT | Mod: QW | Performed by: STUDENT IN AN ORGANIZED HEALTH CARE EDUCATION/TRAINING PROGRAM

## 2022-04-19 ASSESSMENT — PAIN SCALES - GENERAL: PAINLEVEL: NO PAIN (0)

## 2022-04-19 NOTE — PATIENT INSTRUCTIONS
Repeat prostate MRI  If PIRADS 3-4-5 -> [facility] prostate biopsy  If PIRADS 1-2-> recheck PSA in 6 months and CHARIS at that time

## 2022-04-19 NOTE — NURSING NOTE
Chief Complaint   Patient presents with     Benign Prostatic Hypertrophy     Here for PVR,UA ans AUA sxs score.   post void residual 10 ml  Suzanne Alexander LPN

## 2022-04-19 NOTE — LETTER
"4/19/2022       RE: Jaswinder Monroe  [id number] [place] [address]     Dear Colleague,    Thank you for referring your patient, Jaswinder Monroe, to the [facility] [place] [facility]. Please see a copy of my visit note below.    CHIEF COMPLAINT   Jaswinder Monroe who is a 66 year old male returns today for follow-up of elevated PSA. + FH prostate cancer in father.      HPI   Jaswinder Monroe is a  66 year old male returns today for follow-up of elevated PSA. + FH prostate cancer in father.      AUA symptom score 3-5-0-1-4-1-1 = 9 moderate QOL pleased      PHYSICAL EXAM  Patient is a 66 year old  male   Vitals: Blood pressure 100/64, height 1.88 m (6' 2\"), weight 104.3 kg (230 lb).  Body mass index is 29.53 kg/m .  General Appearance Adult:   Alert, no acute distress, oriented  HENT: throat/mouth:normal, good dentition  Lungs: no respiratory distress, or pursed lip breathing  Heart: No obvious jugular venous distension present  Abdomen: nondistended  Musculoskeltal: extremities normal, no peripheral edema  Skin: no suspicious lesions or rashes  Neuro: Alert, oriented, speech and mentation normal  Psych: affect and mood normal     Latest Reference Range & Units 04/19/22 08:00   Color Urine Colorless, Straw, Light Yellow, Yellow  Yellow   Appearance Urine Clear  Clear   Glucose Urine Negative mg/dL Negative   Bilirubin Urine Negative  Negative   Ketones Urine Negative mg/dL Negative   Specific Gravity Urine 1.003 - 1.035  >=1.030   PH Urine 5.0 - 7.0  5.5   Protein Albumin Urine Negative mg/dL Negative   Urobilinogen Urine 0.2, 1.0 E.U./dL 0.2   Nitrite Urine Negative  Negative   Blood Urine Negative  Negative   Leukocyte Esterase Urine Negative  Negative       Component PSA   Latest Ref Rng & Units 0.00 - 4.00 ug/L   11/1/2017 4.08 (H)   11/8/2018 3.71   11/13/2019 3.80   11/3/2020 4.75 (H)   12/2/2020 4.54 (H)   10/14/2021 4.92 (H) "   4/13/2022 6.00 (H)       PVR 10 ml    MRI PROSTATE: 3/23/2021 4:07 PM  Size: 6 x 4.8 x 5.8 cm; 87 grams  IMPRESSION:  1. Based on the most suspicious abnormality, this exam is  characterized as PIRADS 2 - Clinically significant cancer is unlikely  to be present.    2. No suspicious adenopathy or evidence of pelvic metastases.       ASSESSMENT and PLAN  66 year old male returns today for follow-up of elevated PSA. + FH prostate cancer in father.  Rising PSA to 6.0 from 4.92 ng/ml about 6 months ago    Based on the patient's demographics, the Prostate Biopsy Collaborative Group nomogram indicates 37 percent risk of high-grade prostate cancer and 22 percent risk of low-grade prostate cancer (reference: Elmira Caicedo, Aylin Stroud, Ely Almaraz, Esther Rivers, Allie Mc, Frankie Workman, Xavier Ybarra, et al.  A Contemporary Prostate Biopsy Risk Calculator Based on Multiple Heterogeneous Cohorts.   Urology 74, no. 2 (2018): 197-203. Doi:[email or web address]/[email or web address].)    Re: BPH with weak stream, declines treatment at this time. PVR very low. UA unremarkable    Discussed reimaging as well as possible biopsy  Risks of imaging include false negative   Risks of biopsy including bleeding, false negative, infection    Repeat prostate MRI  If PIRADS 3-4-5 -> Uronav prostate biopsy  If PIRADS 1-2-> recheck PSA in 6 months and CHARIS at that time      Kevin Morales MD   [facility]  [facility] Phone: [phone]

## 2022-04-19 NOTE — PROGRESS NOTES
"CHIEF COMPLAINT   Jaswinder Monroe who is a 66 year old male returns today for follow-up of elevated PSA. + FH prostate cancer in father.      HPI   Jaswinder Monroe is a  66 year old male returns today for follow-up of elevated PSA. + FH prostate cancer in father.      AUA symptom score 4-2-7-1-4-1-1 = 9 moderate QOL pleased      PHYSICAL EXAM  Patient is a 66 year old  male   Vitals: Blood pressure 100/64, height 1.88 m (6' 2\"), weight 104.3 kg (230 lb).  Body mass index is 29.53 kg/m .  General Appearance Adult:   Alert, no acute distress, oriented  HENT: throat/mouth:normal, good dentition  Lungs: no respiratory distress, or pursed lip breathing  Heart: No obvious jugular venous distension present  Abdomen: nondistended  Musculoskeltal: extremities normal, no peripheral edema  Skin: no suspicious lesions or rashes  Neuro: Alert, oriented, speech and mentation normal  Psych: affect and mood normal     Latest Reference Range & Units 04/19/22 08:00   Color Urine Colorless, Straw, Light Yellow, Yellow  Yellow   Appearance Urine Clear  Clear   Glucose Urine Negative mg/dL Negative   Bilirubin Urine Negative  Negative   Ketones Urine Negative mg/dL Negative   Specific Gravity Urine 1.003 - 1.035  >=1.030   PH Urine 5.0 - 7.0  5.5   Protein Albumin Urine Negative mg/dL Negative   Urobilinogen Urine 0.2, 1.0 E.U./dL 0.2   Nitrite Urine Negative  Negative   Blood Urine Negative  Negative   Leukocyte Esterase Urine Negative  Negative       Component PSA   Latest Ref Rng & Units 0.00 - 4.00 ug/L   11/1/2017 4.08 (H)   11/8/2018 3.71   11/13/2019 3.80   11/3/2020 4.75 (H)   12/2/2020 4.54 (H)   10/14/2021 4.92 (H)   4/13/2022 6.00 (H)       PVR 10 ml    MRI PROSTATE: 3/23/2021 4:07 PM  Size: 6 x 4.8 x 5.8 cm; 87 grams  IMPRESSION:  1. Based on the most suspicious abnormality, this exam is  characterized as PIRADS 2 - Clinically significant cancer is unlikely  to be present.    2. No suspicious adenopathy or evidence of pelvic " metastases.       ASSESSMENT and PLAN  66 year old male returns today for follow-up of elevated PSA. + FH prostate cancer in father.  Rising PSA to 6.0 from 4.92 ng/ml about 6 months ago    Based on the patient's demographics, the Prostate Biopsy Collaborative Group nomogram indicates 37 percent risk of high-grade prostate cancer and 22 percent risk of low-grade prostate cancer (reference: Elmira Caicedo, Aylin Stroud, Ely Almaraz, Esther Rivers, Allie Mc, Frankie Workman, Xavier Ybarra, et al.  A Contemporary Prostate Biopsy Risk Calculator Based on Multiple Heterogeneous Cohorts.   Urology 74, no. 2 (2018): 197-203. Doi:[email or web address]/[email or web address].)    Re: BPH with weak stream, declines treatment at this time. PVR very low. UA unremarkable    Discussed reimaging as well as possible biopsy  Risks of imaging include false negative   Risks of biopsy including bleeding, false negative, infection    Repeat prostate MRI  If PIRADS 3-4-5 -> Uronav prostate biopsy  If PIRADS 1-2-> recheck PSA in 6 months and CHARIS at that time      Kevin Morales MD   [facility]  [facility] Phone: [phone]

## 2022-04-21 ENCOUNTER — OFFICE VISIT (OUTPATIENT)
Dept: FAMILY MEDICINE | Facility: CLINIC | Age: 67
End: 2022-04-21
Payer: COMMERCIAL

## 2022-04-21 VITALS — DIASTOLIC BLOOD PRESSURE: 72 MMHG | SYSTOLIC BLOOD PRESSURE: 130 MMHG

## 2022-04-21 DIAGNOSIS — L72.0 EIC (EPIDERMAL INCLUSION CYST): Primary | ICD-10-CM

## 2022-04-21 PROCEDURE — 99207 PR DROP WITH A PROCEDURE: CPT | Performed by: PHYSICIAN ASSISTANT

## 2022-04-21 PROCEDURE — 11402 EXC TR-EXT B9+MARG 1.1-2 CM: CPT | Performed by: PHYSICIAN ASSISTANT

## 2022-04-21 PROCEDURE — 88304 TISSUE EXAM BY PATHOLOGIST: CPT | Performed by: PATHOLOGY

## 2022-04-21 NOTE — PROGRESS NOTES
[facility]  Encounter Date: Apr 21, 2022  Office Visit   ____________________________________________    Assessment & Plan:    1. EIC, left superior knee. 2 cm.  EXCISIONAL BIOPSY AND COMPLEX:  After thorough discussion of PGACAC, consent obtained, anesthesia with LEC and prep, the margins of the lesion were identified and an elliptical incision was made encompassing the lesion.  The incisions were made through to include the mid-subcutaneous tissue.  The lesion was removed en bloc and submitted for derm pathologic review. Because of the full-thickness nature of the wound and to avoid standing cone deformities, a complex linear repair was planned.  The wound edges were widely undermined until adequate tissue mobility was obtained.  Hemostasis was achieved.  The wound edges were then closed in a layered fashion, using Vicryl for subcutaneous stitches and FAPG sutures for running top stitches.  Postoperative length was 2.5 cm.  Patient will be contacted with result.  EBL minimal; complications none; wound care routine.  The patient was discharged in good condition and will return in one week for wound evaluation.  Discussed the etiology and benign course of lesion. Discussed treatment options including vertical excision. Lesion can recur even after vertical excision.   The extent of scar, wound care, activity restriction, complications,  and healing time were discussed with patient. All questions were answered and an informed decision was made regarding treatment.       Based on lesion type may need to completely remove lesion. Patient will be notified in 7-10 days of results. Wound care directions given.      Reviewed pertinent charts and labs prior to office visit.       Follow-up: one week bandage change      Provider Disclosure:   The documentation recorded by the scribe accurately reflects the services I personally performed and the decisions made by me.    Akua Scott, MS, PAMoiC      Ethan  Disclosure:  I, Susi Bruno, am serving as a scribe to document services personally performed by Akua Scott PA-C based on data collection and the provider's statements to me.   ____________________________________________________    HPI:  Mr. Jaswinder Monroe is a(n) 66 year old male who presents today as a return patient for cyst excision. Patient states this has been present for a while.  Patient reports the following symptoms: growing.  Patient reports the following previous treatments: none.  Patient reports the following modifying factors: none.  Associated symptoms: none.  Patient has no other skin complaints today.  Remainder of the HPI, Meds, PMH, Allergies, FH, and SH was reviewed in chart.       Pertinent findings: None     Medications:  Current Outpatient Medications   Medication     Cholecalciferol (VITAMIN D3 PO)     Multiple Vitamins-Minerals (ZINC PO)     Nutritional Supplements (NUTRITIONAL SUPPLEMENT PO)     POTASSIUM PO     simvastatin (ZOCOR) 20 MG tablet     No current facility-administered medications for this visit.        Past Medical History:   Patient Active Problem List   Diagnosis     Mixed hyperlipidemia- 10 yr CV risk 10%     Elevated prostate specific antigen (PSA)     Past Medical History:   Diagnosis Date     Fibula fracture     Left     Mixed hyperlipidemia- 10 yr CV risk 10% 11/1/2017     Mumps        Past Surgical History:   Past Surgical History:   Procedure Laterality Date     COLONOSCOPY N/A 02/16/2018    Procedure: COLONOSCOPY;  colonoscopy;  Surgeon: Александр Madrid MD;  Location:  GI     VASECTOMY         Family History:  Family History   Problem Relation Age of Onset     Breast Cancer Mother      Alzheimer Disease Father      Prostate Cancer Father 60     Thyroid Disease Sister      Breast Cancer Maternal Grandmother      Colon Cancer No family hx of        Social History:  Social History     Tobacco Use     Smoking status: Never Smoker     Smokeless tobacco:  Never Used   Substance Use Topics     Alcohol use: Yes     Alcohol/week: 4.0 standard drinks     Types: 4 Standard drinks or equivalent per week     Comment: social          Review Of Systems:  Skin: cyst, left knee  Eyes: negative  Ears/Nose/Throat: negative  Respiratory: No shortness of breath, dyspnea on exertion, cough, or hemoptysis  Cardiovascular: negative  Gastrointestinal: negative  Genitourinary: negative  Musculoskeletal: negative  Neurologic: negative  Psychiatric: negative  Hematologic/Lymphatic/Immunologic: negative  Endocrine: negative      Objective:     /72   Eyes: Conjunctivae/lids: Normal   ENT: Lips:  Normal  MSK: Normal  Cardiovascular: Peripheral edema none  Pulm: Breathing Normal  Neuro/Psych: Orientation: Normal; Mood/Affect: Normal, NAD, WDWN  Pt accompanied by: self  Following areas examined: face, ant neck, left distal ventral thigh/knee  Carrillo skin type: I   Findings:  2.0 cm subcutaneous nodule with overlying punctum on left superior knee

## 2022-04-21 NOTE — PATIENT INSTRUCTIONS
Sutured Wound Care     [facility]: [phone]    [facility]: [phone]          No strenuous activity for 48 hours. Resume moderate activity in 48 hours. No heavy exercising until you are seen for follow up in one week.     Take Tylenol as needed for discomfort.                         Do not drink alcoholic beverages for 48 hours.     Keep the pressure bandage in place for 24 hours. If the bandage becomes blood tinged or loose, reinforce it with gauze and tape.        (Refer to the reverse side of this page for management of bleeding).    Remove pressure bandage in 24 hours     Leave the flat bandage in place until your follow up appointment.    Keep the bandage dry. Wash around it carefully.    If the tape becomes soiled or starts to come off, reinforce it with additional paper tape.    Do not smoke for 3 weeks; smoking is detrimental to wound healing.    It is normal to have swelling and bruising around the surgical site. The bruising will fade in approximately 10-14 days. Elevate the area to reduce swelling.    Numbness, itchiness and sensitivity to temperature changes can occur after surgery and may take up to 18 months to normalize.      POSSIBLE COMPLICATIONS    BLEEDING:    Leave the bandage in place.  Use tightly rolled up gauze or a cloth to apply direct pressure over the bandage for 20   minutes.  Reapply pressure for an additional 20 minutes if necessary  Call the office or go to the nearest emergency room if pressure fails to stop the bleeding.  Use additional gauze and tape to maintain pressure once the bleeding has stopped.        PAIN:    Post operative pain should slowly get better, never worse.  A severe increase in pain may indicate a problem. Call the office if this occurs.    In case of emergency phone: [phone]

## 2022-04-21 NOTE — LETTER
4/21/2022         RE: Jaswinder Monroe  [address] [place] [address]        Dear Colleague,    Thank you for referring your patient, Jaswinder Monroe, to the [facility]. Please see a copy of my visit note below.    [facility]  Encounter Date: Apr 21, 2022  Office Visit   ____________________________________________    Assessment & Plan:    1. EIC, left superior knee. 2 cm.  EXCISIONAL BIOPSY AND COMPLEX:  After thorough discussion of PGACAC, consent obtained, anesthesia with LEC and prep, the margins of the lesion were identified and an elliptical incision was made encompassing the lesion.  The incisions were made through to include the mid-subcutaneous tissue.  The lesion was removed en bloc and submitted for derm pathologic review. Because of the full-thickness nature of the wound and to avoid standing cone deformities, a complex linear repair was planned.  The wound edges were widely undermined until adequate tissue mobility was obtained.  Hemostasis was achieved.  The wound edges were then closed in a layered fashion, using Vicryl for subcutaneous stitches and FAPG sutures for running top stitches.  Postoperative length was 2.5 cm.  Patient will be contacted with result.  EBL minimal; complications none; wound care routine.  The patient was discharged in good condition and will return in one week for wound evaluation.  Discussed the etiology and benign course of lesion. Discussed treatment options including vertical excision. Lesion can recur even after vertical excision.   The extent of scar, wound care, activity restriction, complications,  and healing time were discussed with patient. All questions were answered and an informed decision was made regarding treatment.       Based on lesion type may need to completely remove lesion. Patient will be notified in 7-10 days of results. Wound care directions given.      Reviewed pertinent charts and labs prior  to office visit.       Follow-up: one week bandage change      Provider Disclosure:   The documentation recorded by the scribe accurately reflects the services I personally performed and the decisions made by me.    Akua Scott, MS, AZRA      Scribe Disclosure:  I, Susi Bruno, am serving as a scribe to document services personally performed by Akua Scott PA-C based on data collection and the provider's statements to me.   ____________________________________________________    HPI:  Mr. Jaswinder Monroe is a(n) 66 year old male who presents today as a return patient for cyst excision. Patient states this has been present for a while.  Patient reports the following symptoms: growing.  Patient reports the following previous treatments: none.  Patient reports the following modifying factors: none.  Associated symptoms: none.  Patient has no other skin complaints today.  Remainder of the HPI, Meds, PMH, Allergies, FH, and SH was reviewed in chart.       Pertinent findings: None     Medications:  Current Outpatient Medications   Medication     Cholecalciferol (VITAMIN D3 PO)     Multiple Vitamins-Minerals (ZINC PO)     Nutritional Supplements (NUTRITIONAL SUPPLEMENT PO)     POTASSIUM PO     simvastatin (ZOCOR) 20 MG tablet     No current facility-administered medications for this visit.        Past Medical History:   Patient Active Problem List   Diagnosis     Mixed hyperlipidemia- 10 yr CV risk 10%     Elevated prostate specific antigen (PSA)     Past Medical History:   Diagnosis Date     Fibula fracture     Left     Mixed hyperlipidemia- 10 yr CV risk 10% 11/1/2017     Mumps        Past Surgical History:   Past Surgical History:   Procedure Laterality Date     COLONOSCOPY N/A 02/16/2018    Procedure: COLONOSCOPY;  colonoscopy;  Surgeon: Александр Madrid MD;  Location:  GI     VASECTOMY         Family History:  Family History   Problem Relation Age of Onset     Breast Cancer Mother      Alzheimer  Disease Father      Prostate Cancer Father 60     Thyroid Disease Sister      Breast Cancer Maternal Grandmother      Colon Cancer No family hx of        Social History:  Social History     Tobacco Use     Smoking status: Never Smoker     Smokeless tobacco: Never Used   Substance Use Topics     Alcohol use: Yes     Alcohol/week: 4.0 standard drinks     Types: 4 Standard drinks or equivalent per week     Comment: social          Review Of Systems:  Skin: cyst, left knee  Eyes: negative  Ears/Nose/Throat: negative  Respiratory: No shortness of breath, dyspnea on exertion, cough, or hemoptysis  Cardiovascular: negative  Gastrointestinal: negative  Genitourinary: negative  Musculoskeletal: negative  Neurologic: negative  Psychiatric: negative  Hematologic/Lymphatic/Immunologic: negative  Endocrine: negative      Objective:     /72   Eyes: Conjunctivae/lids: Normal   ENT: Lips:  Normal  MSK: Normal  Cardiovascular: Peripheral edema none  Pulm: Breathing Normal  Neuro/Psych: Orientation: Normal; Mood/Affect: Normal, NAD, WDWN  Pt accompanied by: self  Following areas examined: face, ant neck, left distal ventral thigh/knee  Carrillo skin type: I   Findings:  2.0 cm subcutaneous nodule with overlying punctum on left superior knee             Again, thank you for allowing me to participate in the care of your patient.        Sincerely,        Akua Scott PA-C

## 2022-04-26 LAB
PATH REPORT.COMMENTS IMP SPEC: NORMAL
PATH REPORT.COMMENTS IMP SPEC: NORMAL
PATH REPORT.FINAL DX SPEC: NORMAL
PATH REPORT.GROSS SPEC: NORMAL
PATH REPORT.MICROSCOPIC SPEC OTHER STN: NORMAL
PATH REPORT.RELEVANT HX SPEC: NORMAL

## 2022-04-28 ENCOUNTER — ALLIED HEALTH/NURSE VISIT (OUTPATIENT)
Dept: FAMILY MEDICINE | Facility: CLINIC | Age: 67
End: 2022-04-28
Payer: COMMERCIAL

## 2022-04-28 DIAGNOSIS — Z48.01 ENCOUNTER FOR CHANGE OR REMOVAL OF SURGICAL WOUND DRESSING: Primary | ICD-10-CM

## 2022-04-28 PROCEDURE — 99207 PR NO CHARGE NURSE ONLY: CPT | Performed by: PHYSICIAN ASSISTANT

## 2022-04-28 NOTE — NURSING NOTE
Pt returned to clinic for post surgery 1 week follow up bandage change. Pt has no complaints, denies pain. Bandage removed to left knee, area cleansed with normal saline. Site is healing and wound edges approximating well. Reapplied new steri strips and paper tape.    Advised to watch for signs/sx of infection; spreading redness, drainage, odor, fever. Call or report promptly to clinic. Pt given written instructions and informed to rtc as needed. Patient verbalized understanding.       Jaswinder JUAN Monroe comes into clinic today at the request of Akua Scott-See order from 4/21 OV. Ordering Provider for Wound Check Action taken: Dressing change.        This service provided today was under the supervising provider of the day Akua Scott, who was available if needed.    Piedad Price MA

## 2022-04-28 NOTE — PATIENT INSTRUCTIONS
WOUND CARE INSTRUCTIONS  for  ONE WEEK AFTER SURGERY              Leave flat bandage on your skin for one week after today s bandage change.  In one week when you remove the bandage, you may resume your regular skin care routine, including washing with mild soap and water, applying moisturizer, make-up and sunscreen.     If there are any open or bleeding areas at the incision/graft site you should begin to cover the area with a bandage daily as follows:     Clean and dry the area with plain tap water using a Q-tip or sterile gauze pad.  Apply Polysporin or Bacitracin ointment to the open area.  Cover the wound with a band-aid or a sterile non-stick gauze pad and micropore paper tape.                  *Once the bandages are removed, the scar will be red and firm (especially in the lip/chin area). This is normal and will fade in time. It might take 6-12 months for this to happen.      *Massaging the area will help the scar soften and fade quicker. Begin to massage the area one month after the bandages have been removed. To massage apply pressure directly and firmly over the scar with the fingertips and move in a circular motion. Massage the area for a few minutes several times a day. Continue to massage the site for several months.     *Approximately 6-8 weeks after surgery it is not uncommon to see the formation of  tender pimple-like  bump along the scar. This is normal. As the scar continues to mature and the stitches underneath the skin begin to dissolve, this might occur. Do not pick or squeeze, this will resolve on it s own. Should one break open producing a small amount of drainage, apply Polysporin or Bacitracin ointment a few times a day until the wound is completely healed.     *Numbness in the surgical area is expected. It might take 12-18 months for the feeling to return to normal. During this time sensations of itchiness, tingling and occasional sharp pains might be noted. These feelings are normal and  will subside once the nerves have completely healed.

## 2022-05-08 ENCOUNTER — ANCILLARY PROCEDURE (OUTPATIENT)
Dept: MRI IMAGING | Facility: CLINIC | Age: 67
End: 2022-05-08
Attending: STUDENT IN AN ORGANIZED HEALTH CARE EDUCATION/TRAINING PROGRAM
Payer: COMMERCIAL

## 2022-05-08 DIAGNOSIS — Z80.42 FAMILY HISTORY OF PROSTATE CANCER IN FATHER: ICD-10-CM

## 2022-05-08 DIAGNOSIS — R97.20 ELEVATED PROSTATE SPECIFIC ANTIGEN (PSA): ICD-10-CM

## 2022-05-08 PROCEDURE — A9585 GADOBUTROL INJECTION: HCPCS | Performed by: RADIOLOGY

## 2022-05-08 PROCEDURE — 72197 MRI PELVIS W/O & W/DYE: CPT | Performed by: RADIOLOGY

## 2022-05-08 RX ORDER — GADOBUTROL 604.72 MG/ML
10 INJECTION INTRAVENOUS ONCE
Status: COMPLETED | OUTPATIENT
Start: 2022-05-08 | End: 2022-05-08

## 2022-05-08 RX ADMIN — GADOBUTROL 10 ML: 604.72 INJECTION INTRAVENOUS at 07:31

## 2022-06-15 ENCOUNTER — OFFICE VISIT (OUTPATIENT)
Dept: UROLOGY | Facility: CLINIC | Age: 67
End: 2022-06-15
Payer: COMMERCIAL

## 2022-06-15 VITALS
SYSTOLIC BLOOD PRESSURE: 129 MMHG | OXYGEN SATURATION: 94 % | HEART RATE: 72 BPM | WEIGHT: 238 LBS | BODY MASS INDEX: 30.54 KG/M2 | HEIGHT: 74 IN | DIASTOLIC BLOOD PRESSURE: 85 MMHG

## 2022-06-15 DIAGNOSIS — R97.20 ELEVATED PROSTATE SPECIFIC ANTIGEN (PSA): Primary | ICD-10-CM

## 2022-06-15 DIAGNOSIS — Z79.2 PROPHYLACTIC ANTIBIOTIC: ICD-10-CM

## 2022-06-15 PROCEDURE — 96372 THER/PROPH/DIAG INJ SC/IM: CPT | Mod: 59 | Performed by: STUDENT IN AN ORGANIZED HEALTH CARE EDUCATION/TRAINING PROGRAM

## 2022-06-15 PROCEDURE — 88305 TISSUE EXAM BY PATHOLOGIST: CPT | Performed by: PATHOLOGY

## 2022-06-15 PROCEDURE — 55700 PR BIOPSY OF PROSTATE,NEEDLE/PUNCH: CPT | Performed by: STUDENT IN AN ORGANIZED HEALTH CARE EDUCATION/TRAINING PROGRAM

## 2022-06-15 PROCEDURE — 76942 ECHO GUIDE FOR BIOPSY: CPT | Performed by: STUDENT IN AN ORGANIZED HEALTH CARE EDUCATION/TRAINING PROGRAM

## 2022-06-15 RX ORDER — LIDOCAINE HYDROCHLORIDE 10 MG/ML
20 INJECTION, SOLUTION EPIDURAL; INFILTRATION; INTRACAUDAL; PERINEURAL ONCE
Status: DISCONTINUED | OUTPATIENT
Start: 2022-06-15 | End: 2022-06-15 | Stop reason: CLARIF

## 2022-06-15 RX ORDER — GENTAMICIN 40 MG/ML
80 INJECTION, SOLUTION INTRAMUSCULAR; INTRAVENOUS ONCE
Status: COMPLETED | OUTPATIENT
Start: 2022-06-15 | End: 2022-06-15

## 2022-06-15 RX ORDER — LIDOCAINE HYDROCHLORIDE 10 MG/ML
20 INJECTION, SOLUTION EPIDURAL; INFILTRATION; INTRACAUDAL; PERINEURAL ONCE
Status: COMPLETED | OUTPATIENT
Start: 2022-06-15 | End: 2022-06-15

## 2022-06-15 RX ADMIN — GENTAMICIN 80 MG: 40 INJECTION, SOLUTION INTRAMUSCULAR; INTRAVENOUS at 10:00

## 2022-06-15 RX ADMIN — LIDOCAINE HYDROCHLORIDE 20 ML: 10 INJECTION, SOLUTION EPIDURAL; INFILTRATION; INTRACAUDAL; PERINEURAL at 10:45

## 2022-06-15 ASSESSMENT — PAIN SCALES - GENERAL: PAINLEVEL: NO PAIN (0)

## 2022-06-15 NOTE — PATIENT INSTRUCTIONS
[facility]  Transrectal Ultrasound  Post Operative Information    The physician who performed your Transrectal Ultrasound is Dr. Morales  telephone number [phone]).  Please contact this doctor if you have any problems or questions.  If unable to reach your doctor, please return to the Emergency Department.    Take one antibiotic the evening of the procedure and then as directed on your prescription.  Drink at least 6-8 glasses of fluids for the first 48 hours.  Avoid heavy lifting and strenuous activity for 48 hours.  Avoid sexual intercourse for the first 24 hours.  No aspirin or ibuprofen products (Motrin, Advil, Nuprin, ect.) for one week.  You may take acetaminophen (Tylenol) for pain.  You may notice a small amount of blood on the tissue after a bowel movement.  You may pass blood with clots in your urine following the procedure.  The amount will decrease with time but may be visible for up to two weeks.   You make have blood in your semen for 4 weeks after the procedure.  You may experience mild perineal (groin area) discomfort after the procedure.  Please call you doctor if you have any of the follow symptoms:  Fever  Increase in the amount of blood passed  Severe discomfort or pain

## 2022-06-15 NOTE — PROGRESS NOTES
PREPROCEDURE DIAGNOSIS: Elevated PSA.     POSTPROCEDURE DIAGNOSIS: Elevated PSA.     PROCEDURE: Transrectal ultrasound sizing and MRI - transrectal ultrasound fusion (Uronav) guided prostatic needle biopsy    SURGEON: Kevin Morales MD    ANESTHESIA: 7.5 mL of 1% lidocaine periprostatic block bilaterally     DESCRIPTION OF PROCEDURE: The procedure, the outcome, the anesthesia, and the risks were discussed with the patient. Informed consent was obtained and signed and a timeout was completed prior to the procedure. Digital rectal examination was performed with the below findings noted. Anesthesia was administered as noted above and the transrectal ultrasound probe was inserted, sizing was performed, MRI-TRUS fusion registration was performed and the below findings were noted. 15 core biopsies were taken as described below. The probe was then withdrawn. Patient tolerated the procedure well.     FINDINGS: Digital rectal exam reveals enlarged prostate. Total volume is 72 mL. Then 15 core biopsies were taken with 3 of the MRI target, one at each mid and lateral base, mid and apical regions of the prostate bilaterally.    PLAN: We will release the results on N[employer] as soon as they are available. The patient understands that the pathology results may inform him that he has prostate cancer. He will return in about a week for a scheduled discussion of the results     Kevin Morales MD   [facility] Urology  [phone] clinic phone

## 2022-06-15 NOTE — NURSING NOTE
"Chief Complaint   Patient presents with     Elevated PSA        MRI-Transrectal Ultrasound Fusion-Guided Prostate Biopsy       Blood pressure 135/79, pulse 74, height 1.88 m (6' 2\"), weight 108 kg (238 lb), SpO2 95 %. Body mass index is 30.56 kg/m .    Patient Active Problem List   Diagnosis     Mixed hyperlipidemia- 10 yr CV risk 10%     Elevated prostate specific antigen (PSA)       No Known Allergies    Current Outpatient Medications   Medication Sig Dispense Refill     Cholecalciferol (VITAMIN D3 PO) Take 250 mcg by mouth daily       Multiple Vitamins-Minerals (ZINC PO) Take 50 mg by mouth daily       Nutritional Supplements (NUTRITIONAL SUPPLEMENT PO) Balance of nature  Apple cider vinegar  Dietary supplemeant, \"Release\"  Super beets       simvastatin (ZOCOR) 20 MG tablet Take 1 tablet (20 mg) by mouth At Bedtime 90 tablet 3     POTASSIUM PO Take 99 mg by mouth daily (Patient not taking: Reported on 6/15/2022)         Social History     Tobacco Use     Smoking status: Never Smoker     Smokeless tobacco: Never Used   Substance Use Topics     Alcohol use: Yes     Alcohol/week: 4.0 standard drinks     Types: 4 Standard drinks or equivalent per week     Comment: social     Drug use: No       Procedure was explained to patient prior to performing said procedure. The patient signed the consent form and all questions were answered prior to the procedure. Any pre-procedural antibiotics were given according to the performing physicians recommendation. Pt's information was confirmed on samples and samples were sent for analysis. [facility] reviewed information on labels sent with patient and confirmed the accuracy of all the labels.    Consent read and signed: Yes   No Known Allergies  Performing Physician: Dr. Morales  Antibiotic taken?  YES  Aspirin or other blood thinning medications discontinued 7-10 days:  Yes  Time of Fleet's enema:  Yes  Patient given Gentamicin intramuscular injection 30 minutes prior to procedure " Yes    12 samples were taken from the right and left, medial and lateral base, mid, and apex of the prostate respectively. Yes additional samples were taken Vitals were repeated prior to patient leaving and instructions for post    MRI-Transrectal Ultrasound Fusion-Guided Prostate Biopsy care were explained to the patient.         The following medication was given:     MEDICATION:  GENTAMICIN 80MG  ROUTE: IM  SITE: LUQ - Gluteus  DOSE: 80MG  LOT #: [id number]  : [employer]  EXPIRATION DATE: 10/22  NDC#: 44444-291-70  Was there drug waste? No  Multi-dose vial: No    The following medication was given:     MEDICATION:  Lidocaine 2% Soln  ROUTE: RECTAL / INFILTRATION  SITE: PROSTATE/ INFILTRATION  DOSE: 16ML  LOT #: [id number]  : [employer]  EXPIRATION DATE: 01/2024  NDC#: [id number]  Was there drug waste? Yes  Amount of drug waste (mL): 2ML.  Reason for waste:  Single use vial  Multi-dose vial: No      RANDALL Fields  6/15/2022  10:16 AM

## 2022-06-15 NOTE — LETTER
6/15/2022       RE: Jaswinder Monroe  [id number] [place] [address]     Dear Colleague,    Thank you for referring your patient, Jaswinder Monroe, to the [facility] UROLOGY CLINIC [place] at [facility]. Please see a copy of my visit note below.    PREPROCEDURE DIAGNOSIS: Elevated PSA.     POSTPROCEDURE DIAGNOSIS: Elevated PSA.     PROCEDURE: Transrectal ultrasound sizing and MRI - transrectal ultrasound fusion (Uronav) guided prostatic needle biopsy    SURGEON: Kevin Morales MD    ANESTHESIA: 7.5 mL of 1% lidocaine periprostatic block bilaterally     DESCRIPTION OF PROCEDURE: The procedure, the outcome, the anesthesia, and the risks were discussed with the patient. Informed consent was obtained and signed and a timeout was completed prior to the procedure. Digital rectal examination was performed with the below findings noted. Anesthesia was administered as noted above and the transrectal ultrasound probe was inserted, sizing was performed, MRI-TRUS fusion registration was performed and the below findings were noted. 15 core biopsies were taken as described below. The probe was then withdrawn. Patient tolerated the procedure well.     FINDINGS: Digital rectal exam reveals enlarged prostate. Total volume is 72 mL. Then 15 core biopsies were taken with 3 of the MRI target, one at each mid and lateral base, mid and apical regions of the prostate bilaterally.    PLAN: We will release the results on [employer] as soon as they are available. The patient understands that the pathology results may inform him that he has prostate cancer. He will return in about a week for a scheduled discussion of the results     Kevin Morales MD   [facility] Urology    [phone] clinic phone

## 2022-06-16 LAB
PATH REPORT.COMMENTS IMP SPEC: ABNORMAL
PATH REPORT.COMMENTS IMP SPEC: ABNORMAL
PATH REPORT.COMMENTS IMP SPEC: YES
PATH REPORT.FINAL DX SPEC: ABNORMAL
PATH REPORT.GROSS SPEC: ABNORMAL
PATH REPORT.MICROSCOPIC SPEC OTHER STN: ABNORMAL
PATH REPORT.RELEVANT HX SPEC: ABNORMAL
PHOTO IMAGE: ABNORMAL

## 2022-06-22 ENCOUNTER — VIRTUAL VISIT (OUTPATIENT)
Dept: UROLOGY | Facility: CLINIC | Age: 67
End: 2022-06-22
Payer: COMMERCIAL

## 2022-06-22 VITALS — WEIGHT: 238 LBS | HEIGHT: 74 IN | BODY MASS INDEX: 30.54 KG/M2

## 2022-06-22 DIAGNOSIS — C61 PROSTATE CANCER (H): Primary | ICD-10-CM

## 2022-06-22 PROCEDURE — 99213 OFFICE O/P EST LOW 20 MIN: CPT | Mod: 95 | Performed by: STUDENT IN AN ORGANIZED HEALTH CARE EDUCATION/TRAINING PROGRAM

## 2022-06-22 ASSESSMENT — PAIN SCALES - GENERAL: PAINLEVEL: NO PAIN (0)

## 2022-06-22 NOTE — LETTER
"6/22/2022       RE: Jaswinder Monroe  [id number] [place] [address]     Dear Colleague,    Thank you for referring your patient, Jaswinder Monroe, to the [facility]OLOGY [facility] [place] at [facility]. Please see a copy of my visit note below.    PT WILL MEET YOU IN [facility]    Jaswinder is a 66 year old who is being evaluated via a billable video visit.      How would you like to obtain your AVS? [facility]  If the video visit is dropped, the invitation should be resent by: Text to cell phone: [phone]  Will anyone else be joining your video visit? No    CHIEF COMPLAINT   Jaswinder Monroe who is a 66 year old male returns today for follow-up of elevated PSA, + FH prostate cancer in father, new diagnosis of prostate cancer    HPI   Jaswinder Monroe is a 66 year old male returns today for follow-up of elevated PSA, + FH prostate cancer in father.      Doing well after biopsy    PHYSICAL EXAM  Patient is a 66 year old  male   Vitals: Height 1.88 m (6' 2\"), weight 108 kg (238 lb).  Body mass index is 30.56 kg/m .  General Appearance Adult:   Alert, no acute distress, oriented  HENT: throat/mouth:normal, good dentition  Lungs: no respiratory distress, or pursed lip breathing  Heart: No obvious jugular venous distension present  Abdomen: nondistended  Musculoskeltal: extremities normal, no peripheral edema  Skin: no suspicious lesions or rashes  Neuro: Alert, oriented, speech and mentation normal  Psych: affect and mood normal    6/15/2022 surgical pathology    A.  Prostate, left lateral base: Biopsy:  - Benign prostatic tissue      B.  Prostate, left lateral mid: Biopsy:  - Benign prostatic tissue      C.  Prostate, left lateral apex: Biopsy:  - Benign prostatic tissue      D.  Prostate, left base: Biopsy:  - Benign prostatic tissue      E.  Prostate, left mid: Biopsy:  - Benign prostatic tissue      F.  Prostate, left apex: Biopsy:  - Benign prostatic tissue "      G.  Prostate, right base: Biopsy:  - Atypical small acinar proliferation      H.  Prostate, right mid: Biopsy:  - Atypical small acinar proliferation       I.  Prostate, right apex: Biopsy:  - Benign prostatic tissue       J.  Prostate, right lateral base: Biopsy:  - Benign prostatic tissue        K.  Prostate, right lateral mid: Biopsy:  - Benign prostatic tissue       L.  Prostate, right lateral apex: Biopsy:  - Benign prostatic tissue       M.  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 I discussed my recommendation of active surveillance with delayed intervention for this cancer. Briefly discussed potential role of definitive therapy in the future (radical prostatectomy or radiation) but at this time the best option is ASDI which consists of periodic PSA monitoring, physical exam, reimaging and rebiopsy as indicated    Follow up in 6 months with repeat PSA.    Kevin Morales MD   [facility] Urology  [facility] Phone: [phone]      Video-Visit Details    Video Start Time: 11:39am    Type of service:  Video Visit    Video End Time:11:51 AM    Originating Location (pt. Location): Home    Distant Location (provider location):  [facility] UROLOGY CL[facility] [place]     Platform used for Video Visit: [employer]

## 2022-06-22 NOTE — PROGRESS NOTES
"PT WILL MEET YOU IN [facility]    Jaswinder is a 66 year old who is being evaluated via a billable video visit.      How would you like to obtain your AVS? [facility]  If the video visit is dropped, the invitation should be resent by: Text to cell phone: [phone]  Will anyone else be joining your video visit? No    CHIEF COMPLAINT   Jaswinder Monroe who is a 66 year old male returns today for follow-up of elevated PSA, + FH prostate cancer in father, new diagnosis of prostate cancer    HPI   Jaswinder Monroe is a 66 year old male returns today for follow-up of elevated PSA, + FH prostate cancer in father.      Doing well after biopsy    PHYSICAL EXAM  Patient is a 66 year old  male   Vitals: Height 1.88 m (6' 2\"), weight 108 kg (238 lb).  Body mass index is 30.56 kg/m .  General Appearance Adult:   Alert, no acute distress, oriented  HENT: throat/mouth:normal, good dentition  Lungs: no respiratory distress, or pursed lip breathing  Heart: No obvious jugular venous distension present  Abdomen: nondistended  Musculoskeltal: extremities normal, no peripheral edema  Skin: no suspicious lesions or rashes  Neuro: Alert, oriented, speech and mentation normal  Psych: affect and mood normal    6/15/2022 surgical pathology    A.  Prostate, left lateral base: Biopsy:  - Benign prostatic tissue      B.  Prostate, left lateral mid: Biopsy:  - Benign prostatic tissue      C.  Prostate, left lateral apex: Biopsy:  - Benign prostatic tissue      D.  Prostate, left base: Biopsy:  - Benign prostatic tissue      E.  Prostate, left mid: Biopsy:  - Benign prostatic tissue      F.  Prostate, left apex: Biopsy:  - Benign prostatic tissue      G.  Prostate, right base: Biopsy:  - Atypical small acinar proliferation      H.  Prostate, right mid: Biopsy:  - Atypical small acinar proliferation       I.  Prostate, right apex: Biopsy:  - Benign prostatic tissue       J.  Prostate, right lateral base: Biopsy:  - Benign prostatic tissue        K.  " Prostate, right lateral mid: Biopsy:  - Benign prostatic tissue       L.  Prostate, right lateral apex: Biopsy:  - Benign prostatic tissue       M.  Prostate, target lesion: Biopsy:  - Prostatic acinar adenocarcinoma, [place] score 3 + 3 = 6, grade group 1, involving 3 of 3 core(s), measuring 3 mm in greatest linear dimension, representing 20% of total core volume   - Perineural invasion present          ASSESSMENT and PLAN  66 year old male returns today for follow-up of elevated PSA, + FH prostate cancer in father, new diagnosis of low risk clinically localized prostate cancer (iPSA 6.0 ng/ml, Renae 3+3=6, 3/15 cores, dx 6/15/22). I discussed my recommendation of active surveillance with delayed intervention for this cancer. Briefly discussed potential role of definitive therapy in the future (radical prostatectomy or radiation) but at this time the best option is ASDI which consists of periodic PSA monitoring, physical exam, reimaging and rebiopsy as indicated    Follow up in 6 months with repeat PSA.    Kevin Morales MD   [facility] Urology  [facility] Phone: [phone]      Video-Visit Details    Video Start Time: 11:39am    Type of service:  Video Visit    Video End Time:11:51 AM    Originating Location (pt. Location): Home    Distant Location (provider location):  [facility] UROLOGY [facility] [place]     Platform used for Video Visit: Pedro

## 2022-10-23 ENCOUNTER — HEALTH MAINTENANCE LETTER (OUTPATIENT)
Age: 67
End: 2022-10-23

## 2022-11-13 DIAGNOSIS — E78.2 MIXED HYPERLIPIDEMIA: ICD-10-CM

## 2022-11-14 NOTE — TELEPHONE ENCOUNTER
Routing refill request to provider for review/approval because:  Patient needs to be seen because it has been more than 1 year since last office visit.  LOV 11/04/2021    Justice L. Phoenix, RN

## 2022-11-17 RX ORDER — SIMVASTATIN 20 MG
TABLET ORAL
Qty: 90 TABLET | Refills: 0 | Status: SHIPPED | OUTPATIENT
Start: 2022-11-17 | End: 2023-01-05

## 2022-12-08 ENCOUNTER — LAB (OUTPATIENT)
Dept: LAB | Facility: CLINIC | Age: 67
End: 2022-12-08
Payer: COMMERCIAL

## 2022-12-08 DIAGNOSIS — C61 PROSTATE CANCER (H): ICD-10-CM

## 2022-12-08 LAB — PSA SERPL-MCNC: 6.88 NG/ML (ref 0–4.5)

## 2022-12-08 PROCEDURE — 36415 COLL VENOUS BLD VENIPUNCTURE: CPT

## 2022-12-08 PROCEDURE — 84153 ASSAY OF PSA TOTAL: CPT

## 2022-12-10 ENCOUNTER — HEALTH MAINTENANCE LETTER (OUTPATIENT)
Age: 67
End: 2022-12-10

## 2022-12-13 ENCOUNTER — OFFICE VISIT (OUTPATIENT)
Dept: UROLOGY | Facility: CLINIC | Age: 67
End: 2022-12-13
Payer: COMMERCIAL

## 2022-12-13 VITALS
WEIGHT: 238 LBS | HEIGHT: 74 IN | BODY MASS INDEX: 30.54 KG/M2 | DIASTOLIC BLOOD PRESSURE: 84 MMHG | SYSTOLIC BLOOD PRESSURE: 122 MMHG

## 2022-12-13 DIAGNOSIS — Z80.42 FAMILY HISTORY OF PROSTATE CANCER IN FATHER: ICD-10-CM

## 2022-12-13 DIAGNOSIS — C61 PROSTATE CANCER (H): Primary | ICD-10-CM

## 2022-12-13 PROCEDURE — 99213 OFFICE O/P EST LOW 20 MIN: CPT | Performed by: STUDENT IN AN ORGANIZED HEALTH CARE EDUCATION/TRAINING PROGRAM

## 2022-12-13 ASSESSMENT — PAIN SCALES - GENERAL: PAINLEVEL: NO PAIN (0)

## 2022-12-13 NOTE — PROGRESS NOTES
"CHIEF COMPLAINT   Jaswinder Monroe who is a 67 year old male returns today for follow-up +FH prostate cancer in father, low risk prostate cancer (iPSA 6.0 ng/ml, [place] 3+3=6, 3/15 cores, dx 6/15/22)    HPI   Jaswinder Monroe is a 67 year old male returns today for follow-up +FH prostate cancer in father, low risk prostate cancer (iPSA 6.0 ng/ml, Renae 3+3=6, 3/15 cores, dx 6/15/22) on active surveillance    Here for followup. No issues in the interim    PHYSICAL EXAM  Patient is a 67 year old  male   Vitals: Blood pressure 122/84, height 1.88 m (6' 2\"), weight 108 kg (238 lb).  Body mass index is 30.56 kg/m .  General Appearance Adult:   Alert, no acute distress, oriented  HENT: throat/mouth:normal, good dentition  Lungs: no respiratory distress, or pursed lip breathing  Heart: No obvious jugular venous distension present  Abdomen: nondistended  Musculoskeltal: extremities normal, no peripheral edema  Skin: no suspicious lesions or rashes  Neuro: Alert, oriented, speech and mentation normal  Psych: affect and mood normal  Gait: Normal      Component PSA PSA Tumor Marker   Latest Ref Rng & Units 0.00 - 4.00 ug/L 0.00 - 4.50 ng/mL   11/1/2017 4.08 (H)    11/8/2018 3.71    11/13/2019 3.80    11/3/2020 4.75 (H)    12/2/2020 4.54 (H)    10/14/2021 4.92 (H)    4/13/2022 6.00 (H)    12/8/2022  6.88 (H)       ASSESSMENT and PLAN  67 year old male returns today for follow-up +FH prostate cancer in father, low risk prostate cancer (iPSA 6.0 ng/ml, [place] 3+3=6, 3/15 cores, dx 6/15/22) on active surveillance with delayed intervention    Slight rise in PSA to 6.88. will continue active surveillance given the low risk low volume disease    - return 6 months with PSA, prostate MRI prior to discuss    Kevin Morales MD   [facility]rology  [facility] Phone: [phone]    "

## 2022-12-13 NOTE — NURSING NOTE
Chief Complaint   Patient presents with     Elevated PSA     Here to review latest PSA.  No new urinary concerns.    Lisa Gill, EMT

## 2022-12-13 NOTE — LETTER
"12/13/2022       RE: Jaswinder Monroe  [id number] [place] [address]     Dear Colleague,    Thank you for referring your patient, Jaswinder Monroe, to the [facility] UROLOGY CLINIC [place] at [facility]. Please see a copy of my visit note below.    CHIEF COMPLAINT   Jaswinder Monroe who is a 67 year old male returns today for follow-up +FH prostate cancer in father, low risk prostate cancer (iPSA 6.0 ng/ml, [place] 3+3=6, 3/15 cores, dx 6/15/22)    HPI   Jaswinder Monroe is a 67 year old male returns today for follow-up +FH prostate cancer in father, low risk prostate cancer (iPSA 6.0 ng/ml, [place] 3+3=6, 3/15 cores, dx 6/15/22) on active surveillance    Here for followup. No issues in the interim    PHYSICAL EXAM  Patient is a 67 year old  male   Vitals: Blood pressure 122/84, height 1.88 m (6' 2\"), weight 108 kg (238 lb).  Body mass index is 30.56 kg/m .  General Appearance Adult:   Alert, no acute distress, oriented  HENT: throat/mouth:normal, good dentition  Lungs: no respiratory distress, or pursed lip breathing  Heart: No obvious jugular venous distension present  Abdomen: nondistended  Musculoskeltal: extremities normal, no peripheral edema  Skin: no suspicious lesions or rashes  Neuro: Alert, oriented, speech and mentation normal  Psych: affect and mood normal  Gait: Normal      Component PSA PSA Tumor Marker   Latest Ref Rng & Units 0.00 - 4.00 ug/L 0.00 - 4.50 ng/mL   11/1/2017 4.08 (H)    11/8/2018 3.71    11/13/2019 3.80    11/3/2020 4.75 (H)    12/2/2020 4.54 (H)    10/14/2021 4.92 (H)    4/13/2022 6.00 (H)    12/8/2022  6.88 (H)       ASSESSMENT and PLAN  67 year old male returns today for follow-up +FH prostate cancer in father, low risk prostate cancer (iPSA 6.0 ng/ml, [place]ae 3+3=6, 3/15 cores, dx 6/15/22) on active surveillance with delayed intervention    Slight rise in PSA to 6.88. will continue active surveillance given the low " risk low volume disease    - return 6 months with PSA, prostate MRI prior to discuss    Kevin Morales MD   [facility] Urology  [facility] Phone: [phone]

## 2022-12-30 ASSESSMENT — ACTIVITIES OF DAILY LIVING (ADL): CURRENT_FUNCTION: NO ASSISTANCE NEEDED

## 2022-12-30 ASSESSMENT — ENCOUNTER SYMPTOMS
DIZZINESS: 0
HEADACHES: 0
ARTHRALGIAS: 0
CHILLS: 0
FREQUENCY: 0
PALPITATIONS: 0
NAUSEA: 0
SORE THROAT: 0
HEARTBURN: 0
MYALGIAS: 0
HEMATOCHEZIA: 0
PARESTHESIAS: 0
ABDOMINAL PAIN: 0
FEVER: 0
CONSTIPATION: 0
COUGH: 0
JOINT SWELLING: 0
NERVOUS/ANXIOUS: 0
EYE PAIN: 0
HEMATURIA: 0
SHORTNESS OF BREATH: 0
WEAKNESS: 0
DYSURIA: 0
DIARRHEA: 0

## 2023-01-05 ENCOUNTER — OFFICE VISIT (OUTPATIENT)
Dept: INTERNAL MEDICINE | Facility: CLINIC | Age: 68
End: 2023-01-05
Payer: COMMERCIAL

## 2023-01-05 VITALS
TEMPERATURE: 97.9 F | HEIGHT: 74 IN | RESPIRATION RATE: 16 BRPM | WEIGHT: 243.7 LBS | SYSTOLIC BLOOD PRESSURE: 126 MMHG | BODY MASS INDEX: 31.28 KG/M2 | DIASTOLIC BLOOD PRESSURE: 80 MMHG | OXYGEN SATURATION: 96 % | HEART RATE: 66 BPM

## 2023-01-05 DIAGNOSIS — C61 PROSTATE CANCER (H): ICD-10-CM

## 2023-01-05 DIAGNOSIS — Z00.01 ENCOUNTER FOR GENERAL ADULT MEDICAL EXAMINATION WITH ABNORMAL FINDINGS: Primary | ICD-10-CM

## 2023-01-05 DIAGNOSIS — E78.2 MIXED HYPERLIPIDEMIA: ICD-10-CM

## 2023-01-05 DIAGNOSIS — Z23 NEED FOR INFLUENZA VACCINATION: ICD-10-CM

## 2023-01-05 DIAGNOSIS — Z13.1 SCREENING FOR DIABETES MELLITUS: ICD-10-CM

## 2023-01-05 PROCEDURE — 90472 IMMUNIZATION ADMIN EACH ADD: CPT | Performed by: INTERNAL MEDICINE

## 2023-01-05 PROCEDURE — 90471 IMMUNIZATION ADMIN: CPT | Performed by: INTERNAL MEDICINE

## 2023-01-05 PROCEDURE — 90662 IIV NO PRSV INCREASED AG IM: CPT | Performed by: INTERNAL MEDICINE

## 2023-01-05 PROCEDURE — 99397 PER PM REEVAL EST PAT 65+ YR: CPT | Mod: 25 | Performed by: INTERNAL MEDICINE

## 2023-01-05 PROCEDURE — 90677 PCV20 VACCINE IM: CPT | Performed by: INTERNAL MEDICINE

## 2023-01-05 RX ORDER — SIMVASTATIN 20 MG
20 TABLET ORAL AT BEDTIME
Qty: 90 TABLET | Refills: 3 | Status: SHIPPED | OUTPATIENT
Start: 2023-01-05 | End: 2024-01-08

## 2023-01-05 ASSESSMENT — ENCOUNTER SYMPTOMS
CHILLS: 0
SHORTNESS OF BREATH: 0
WEAKNESS: 0
FREQUENCY: 0
EYE PAIN: 0
SORE THROAT: 0
CONSTIPATION: 0
MYALGIAS: 0
HEARTBURN: 0
NERVOUS/ANXIOUS: 0
ABDOMINAL PAIN: 0
ARTHRALGIAS: 0
DIARRHEA: 0
JOINT SWELLING: 0
PARESTHESIAS: 0
HEMATURIA: 0
HEMATOCHEZIA: 0
NAUSEA: 0
DIZZINESS: 0
DYSURIA: 0
COUGH: 0
FEVER: 0
HEADACHES: 0
PALPITATIONS: 0

## 2023-01-05 ASSESSMENT — ACTIVITIES OF DAILY LIVING (ADL): CURRENT_FUNCTION: NO ASSISTANCE NEEDED

## 2023-01-05 NOTE — PATIENT INSTRUCTIONS
Patient Education   Personalized Prevention Plan  You are due for the preventive services outlined below.  Your care team is available to assist you in scheduling these services.  If you have already completed any of these items, please share that information with your care team to update in your medical record.  Health Maintenance Due   Topic Date Due    COVID-19 Vaccine (2 - Booster for [place]leen series) 07/14/2021    Flu Vaccine (1) 09/01/2022    Annual Wellness Visit  11/04/2022    Cholesterol Lab  11/04/2022    Pneumococcal Vaccine (1 - PCV) 07/30/2022       Exercise for a Healthier Heart  You may wonder how you can improve the health of your heart. If you re thinking about exercise, you re on the right track. You don t need to become an athlete. But you do need a certain amount of brisk exercise to help strengthen your heart. If you have been diagnosed with a heart condition, your healthcare provider may advise exercise to help stabilize your condition. To help make exercise a habit, choose safe, fun activities.      Exercise with a friend. When activity is fun, you're more likely to stick with it.   Before you start  Check with your healthcare provider before starting an exercise program. This is especially important if you have not been active for a while. It's also important if you have a long-term (chronic) health problem such as heart disease, diabetes, or obesity. Or if you are at high risk for having these problems.   Why exercise?  Exercising regularly offers many healthy rewards. It can help you do all of the following:   Improve your blood cholesterol level to help prevent further heart trouble  Lower your blood pressure to help prevent a stroke or heart attack  Control diabetes, or reduce your risk of getting this disease  Improve your heart and lung function  Reach and stay at a healthy weight  Make your muscles stronger so you can stay active  Prevent falls and fractures by slowing the loss of  bone mass (osteoporosis)  Manage stress better  Reduce your blood pressure  Improve your sense of self and your body image  Exercise tips    Ease into your routine. Set small goals. Then build on them. If you are not sure what your activity level should be, talk with your healthcare provider first before starting an exercise routine.  Exercise on most days. Aim for a total of 150 minutes (2 hours and 30 minutes) or more of moderate-intensity aerobic activity each week. Or 75 minutes (1 hour and 15 minutes) or more of vigorous-intensity aerobic activity each week. Or try for a combination of both. Moderate activity means that you breathe heavier and your heart rate increases but you can still talk. Think about doing 40 minutes of moderate exercise, 3 to 4 times a week. For best results, activity should last for about 40 minutes to lower blood pressure and cholesterol. It's OK to work up to the 40-minute period over time. Examples of moderate-intensity activity are walking 1 mile in 15 minutes. Or doing 30 to 45 minutes of yard work.  Step up your daily activity level.  Along with your exercise program, try being more active the whole day. Walk instead of drive. Or park further away so that you take more steps each day. Do more household tasks or yard work. You may not be able to meet the advised mount of physical activity. But doing some moderate- or vigorous-intensity aerobic activity can help reduce your risk for heart disease. Your healthcare provider can help you figure out what is best for you.  Choose 1 or more activities you enjoy.  Walking is one of the easiest things you can do. You can also try swimming, riding a bike, dancing, or taking an exercise class.    When to call your healthcare provider  Call your healthcare provider if you have any of these:   Chest pain or feel dizzy or lightheaded  Burning, tightness, pressure, or heaviness in your chest, neck, shoulders, back, or arms  Abnormal shortness of  breath  More joint or muscle pain  A very fast or irregular heartbeat (palpitations)  Joseph last reviewed this educational content on 7/1/2019 2000-2021 [employer]. All rights reserved. This information is not intended as a substitute for professional medical care. Always follow your healthcare professional's instructions.

## 2023-01-05 NOTE — PROGRESS NOTES
"SUBJECTIVE:   Jaswinder is a 67 year old who presents for Preventive Visit.    Patient has been advised of split billing requirements and indicates understanding: Yes  Are you in the first 12 months of your Medicare coverage?  No    Healthy Habits:     In general, how would you rate your overall health?  Excellent    Frequency of exercise:  None    Do you usually eat at least 4 servings of fruit and vegetables a day, include whole grains    & fiber and avoid regularly eating high fat or \"junk\" foods?  Yes    Taking medications regularly:  Yes    Medication side effects:  None    Ability to successfully perform activities of daily living:  No assistance needed    Home Safety:  No safety concerns identified    Hearing Impairment:  No hearing concerns    In the past 6 months, have you been bothered by leaking of urine?  No    In general, how would you rate your overall mental or emotional health?  Excellent      PHQ-2 Total Score: 0    Additional concerns today:  No      Have you ever done Advance Care Planning? (For example, a Health Directive, POLST, or a discussion with a medical provider or your loved ones about your wishes): Yes, patient states has an Advance Care Planning document and will bring a copy to the clinic.       Fall risk  Fallen 2 or more times in the past year?: No  Any fall with injury in the past year?: No    Cognitive Screening   1) Repeat 3 items (Leader, Season, Table)    2) Clock draw: NORMAL  3) 3 item recall: Recalls 3 objects  Results: 3 items recalled: COGNITIVE IMPAIRMENT LESS LIKELY    Mini-CogTM Copyright NARGIS Schroeder. Licensed by the author for use in [facility]; reprinted with permission (mihaela@.[facility]). All rights reserved.      Do you have sleep apnea, excessive snoring or daytime drowsiness?: no    Reviewed and updated as needed this visit by clinical staff    Allergies  Meds     Jaime Garza          Reviewed and updated as needed this visit by Provider         Jaime Garza       "   Social History     Tobacco Use     Smoking status: Never     Smokeless tobacco: Never   Substance Use Topics     Alcohol use: Yes     Alcohol/week: 4.0 standard drinks     Types: 4 Standard drinks or equivalent per week     Comment: social         Alcohol Use 12/30/2022   Prescreen: >3 drinks/day or >7 drinks/week? No   Prescreen: >3 drinks/day or >7 drinks/week? -               Current providers sharing in care for this patient include:   Patient Care Team:  Antwan Ron MD as PCP - General (Internal Medicine)  Antwan Ron MD as Assigned PCP  Kevin Morales MD as MD (Urology)  Antwan Ron MD as Referring Physician (Internal Medicine)  Kevin Morales MD as Assigned Surgical Provider  Milena Day APRN CNP as Nurse Practitioner (Dermatology)    The following health maintenance items are reviewed in Epic and correct as of today:  Health Maintenance   Topic Date Due     COVID-19 Vaccine (2 - Booster for Colleen series) 07/14/2021     INFLUENZA VACCINE (1) 09/01/2022     MEDICARE ANNUAL WELLNESS VISIT  11/04/2022     LIPID  11/04/2022     ANNUAL REVIEW OF HM ORDERS  11/04/2022     Pneumococcal Vaccine: 65+ Years (1 - PCV) 07/30/2022     FALL RISK ASSESSMENT  01/05/2024     DTAP/TDAP/TD IMMUNIZATION (2 - Td or Tdap) 06/01/2024     ADVANCE CARE PLANNING  01/05/2028     COLORECTAL CANCER SCREENING  02/16/2028     HEPATITIS C SCREENING  Completed     PHQ-2 (once per calendar year)  Completed     ZOSTER IMMUNIZATION  Completed     HIV SCREENING  Addressed     IPV IMMUNIZATION  Aged Out     MENINGITIS IMMUNIZATION  Aged Out     AORTIC ANEURYSM SCREENING (SYSTEM ASSIGNED)  Discontinued     Lab work is in process  Labs reviewed in EPIC  Pneumonia Vaccine:P20 today      Review of Systems   Constitutional: Negative for chills and fever.   HENT: Negative for congestion, ear pain, hearing loss and sore throat.    Eyes: Negative for pain and visual disturbance.   Respiratory: Negative for cough  "and shortness of breath.    Cardiovascular: Negative for chest pain, palpitations and peripheral edema.   Gastrointestinal: Negative for abdominal pain, constipation, diarrhea, heartburn, hematochezia and nausea.   Genitourinary: Positive for impotence. Negative for dysuria, frequency, genital sores, hematuria, penile discharge and urgency.   Musculoskeletal: Negative for arthralgias, joint swelling and myalgias.   Skin: Negative for rash.   Neurological: Negative for dizziness, weakness, headaches and paresthesias.   Psychiatric/Behavioral: Negative for mood changes. The patient is not nervous/anxious.          OBJECTIVE:   /80   Pulse 66   Temp 97.9  F (36.6  C) (Temporal)   Resp 16   Ht 1.88 m (6' 2\")   Wt 110.5 kg (243 lb 11.2 oz)   SpO2 96%   BMI 31.29 kg/m   Estimated body mass index is 31.29 kg/m  as calculated from the following:    Height as of this encounter: 1.88 m (6' 2\").    Weight as of this encounter: 110.5 kg (243 lb 11.2 oz).  Physical Exam  GENERAL: healthy, alert and no distress  EYES: Eyes grossly normal to inspection, PERRL and conjunctivae and sclerae normal  HENT: ear canals and TM's normal, nose and mouth without ulcers or lesions  NECK: no adenopathy, no asymmetry, masses, or scars and thyroid normal to palpation  RESP: lungs clear to auscultation - no rales, rhonchi or wheezes  CV: regular rate and rhythm, normal S1 S2, no S3 or S4, no murmur, click or rub, no peripheral edema and peripheral pulses strong  ABDOMEN: soft, nontender, no hepatosplenomegaly, no masses and bowel sounds normal  MS: no gross musculoskeletal defects noted, no edema  SKIN: no suspicious lesions or rashes  NEURO: Normal strength and tone, mentation intact and speech normal  PSYCH: mentation appears normal, affect normal/bright  LYMPH: no cervical, supraclavicular, axillary, or inguinal adenopathy    Labs reviewed in Epic    ASSESSMENT / PLAN:       ICD-10-CM    1. Encounter for general adult medical " "examination with abnormal findings  Z00.01       2. Prostate cancer (H)- active surveillance   C61       3. Mixed hyperlipidemia- 10 yr CV risk 10%  E78.2 Lipid panel reflex to direct LDL Fasting      4. Need for influenza vaccination  Z23       5. Screening for diabetes mellitus  Z13.1 Basic metabolic panel  (Ca, Cl, CO2, Creat, Gluc, K, Na, BUN)      6. Mixed hyperlipidemia- 10 yr CV risk 10.1%  E78.2 simvastatin (ZOCOR) 20 MG tablet        -Updated screening, immunizations, prevention.  Please see health maintenance list, care gaps  -active surv for prostate ca per urology  -immuniz. Refused covid  Patient has been advised of split billing requirements and indicates understanding: Yes      COUNSELING:  Reviewed preventive health counseling, as reflected in patient instructions      BMI:   Estimated body mass index is 31.29 kg/m  as calculated from the following:    Height as of this encounter: 1.88 m (6' 2\").    Weight as of this encounter: 110.5 kg (243 lb 11.2 oz).         He reports that he has never smoked. He has never used smokeless tobacco.      Appropriate preventive services were discussed with this patient, including applicable screening as appropriate for cardiovascular disease, diabetes, osteopenia/osteoporosis, and glaucoma.  As appropriate for age/gender, discussed screening for colorectal cancer, prostate cancer, breast cancer, and cervical cancer. Checklist reviewing preventive services available has been given to the patient.    Reviewed patients plan of care and provided an AVS. The Basic Care Plan (routine screening as documented in Health Maintenance) for Jaswinder meets the Care Plan requirement. This Care Plan has been established and reviewed with the Patient.          Antwan Ron MD  [facility]    Identified Health Risks:  "

## 2023-01-16 ENCOUNTER — LAB (OUTPATIENT)
Dept: LAB | Facility: CLINIC | Age: 68
End: 2023-01-16
Payer: COMMERCIAL

## 2023-01-16 DIAGNOSIS — E78.2 MIXED HYPERLIPIDEMIA: ICD-10-CM

## 2023-01-16 DIAGNOSIS — Z13.1 SCREENING FOR DIABETES MELLITUS: ICD-10-CM

## 2023-01-16 LAB
ANION GAP SERPL CALCULATED.3IONS-SCNC: 12 MMOL/L (ref 7–15)
BUN SERPL-MCNC: 18.4 MG/DL (ref 8–23)
CALCIUM SERPL-MCNC: 9.5 MG/DL (ref 8.8–10.2)
CHLORIDE SERPL-SCNC: 103 MMOL/L (ref 98–107)
CHOLEST SERPL-MCNC: 172 MG/DL
CREAT SERPL-MCNC: 1.1 MG/DL (ref 0.67–1.17)
DEPRECATED HCO3 PLAS-SCNC: 25 MMOL/L (ref 22–29)
GFR SERPL CREATININE-BSD FRML MDRD: 74 ML/MIN/1.73M2
GLUCOSE SERPL-MCNC: 110 MG/DL (ref 70–99)
HDLC SERPL-MCNC: 42 MG/DL
LDLC SERPL CALC-MCNC: 105 MG/DL
NONHDLC SERPL-MCNC: 130 MG/DL
POTASSIUM SERPL-SCNC: 4.4 MMOL/L (ref 3.4–5.3)
SODIUM SERPL-SCNC: 140 MMOL/L (ref 136–145)
TRIGL SERPL-MCNC: 123 MG/DL

## 2023-01-16 PROCEDURE — 80061 LIPID PANEL: CPT

## 2023-01-16 PROCEDURE — 80048 BASIC METABOLIC PNL TOTAL CA: CPT

## 2023-01-16 PROCEDURE — 36415 COLL VENOUS BLD VENIPUNCTURE: CPT

## 2023-01-18 ENCOUNTER — MYC MEDICAL ADVICE (OUTPATIENT)
Dept: INTERNAL MEDICINE | Facility: CLINIC | Age: 68
End: 2023-01-18
Payer: COMMERCIAL

## 2023-01-18 DIAGNOSIS — Z13.1 SCREENING FOR DIABETES MELLITUS: Primary | ICD-10-CM

## 2023-06-09 ENCOUNTER — LAB (OUTPATIENT)
Dept: LAB | Facility: CLINIC | Age: 68
End: 2023-06-09
Payer: COMMERCIAL

## 2023-06-09 DIAGNOSIS — C61 PROSTATE CANCER (H): ICD-10-CM

## 2023-06-09 DIAGNOSIS — Z13.1 SCREENING FOR DIABETES MELLITUS: ICD-10-CM

## 2023-06-09 LAB
FASTING STATUS PATIENT QL REPORTED: YES
GLUCOSE SERPL-MCNC: 104 MG/DL (ref 70–99)
HBA1C MFR BLD: 5.3 % (ref 0–5.6)
PSA SERPL DL<=0.01 NG/ML-MCNC: 5.48 NG/ML (ref 0–4.5)

## 2023-06-09 PROCEDURE — 36415 COLL VENOUS BLD VENIPUNCTURE: CPT

## 2023-06-09 PROCEDURE — 82947 ASSAY GLUCOSE BLOOD QUANT: CPT | Performed by: INTERNAL MEDICINE

## 2023-06-09 PROCEDURE — 83036 HEMOGLOBIN GLYCOSYLATED A1C: CPT | Performed by: INTERNAL MEDICINE

## 2023-06-09 PROCEDURE — 84153 ASSAY OF PSA TOTAL: CPT

## 2023-07-16 ENCOUNTER — ANCILLARY PROCEDURE (OUTPATIENT)
Dept: MRI IMAGING | Facility: CLINIC | Age: 68
End: 2023-07-16
Attending: STUDENT IN AN ORGANIZED HEALTH CARE EDUCATION/TRAINING PROGRAM
Payer: COMMERCIAL

## 2023-07-16 DIAGNOSIS — C61 PROSTATE CANCER (H): ICD-10-CM

## 2023-07-16 PROCEDURE — A9585 GADOBUTROL INJECTION: HCPCS | Mod: JZ | Performed by: RADIOLOGY

## 2023-07-16 PROCEDURE — 72197 MRI PELVIS W/O & W/DYE: CPT | Performed by: RADIOLOGY

## 2023-07-16 RX ORDER — GADOBUTROL 604.72 MG/ML
10 INJECTION INTRAVENOUS ONCE
Status: COMPLETED | OUTPATIENT
Start: 2023-07-16 | End: 2023-07-16

## 2023-07-16 RX ADMIN — GADOBUTROL 10 ML: 604.72 INJECTION INTRAVENOUS at 09:23

## 2023-07-20 DIAGNOSIS — Z79.2 PROPHYLACTIC ANTIBIOTIC: Primary | ICD-10-CM

## 2023-07-20 RX ORDER — CIPROFLOXACIN 500 MG/1
TABLET, FILM COATED ORAL
Qty: 6 TABLET | Refills: 0 | Status: SHIPPED | OUTPATIENT
Start: 2023-07-20 | End: 2024-01-08

## 2023-08-07 ENCOUNTER — OFFICE VISIT (OUTPATIENT)
Dept: UROLOGY | Facility: CLINIC | Age: 68
End: 2023-08-07
Payer: COMMERCIAL

## 2023-08-07 VITALS
OXYGEN SATURATION: 94 % | BODY MASS INDEX: 30.54 KG/M2 | DIASTOLIC BLOOD PRESSURE: 87 MMHG | SYSTOLIC BLOOD PRESSURE: 139 MMHG | WEIGHT: 238 LBS | HEART RATE: 69 BPM | HEIGHT: 74 IN

## 2023-08-07 DIAGNOSIS — C61 PROSTATE CANCER (H): ICD-10-CM

## 2023-08-07 DIAGNOSIS — R97.20 ELEVATED PROSTATE SPECIFIC ANTIGEN (PSA): Primary | ICD-10-CM

## 2023-08-07 PROCEDURE — 88342 IMHCHEM/IMCYTCHM 1ST ANTB: CPT | Performed by: PATHOLOGY

## 2023-08-07 PROCEDURE — G0416 PROSTATE BIOPSY, ANY MTHD: HCPCS | Performed by: PATHOLOGY

## 2023-08-07 PROCEDURE — 96372 THER/PROPH/DIAG INJ SC/IM: CPT | Mod: 59 | Performed by: STUDENT IN AN ORGANIZED HEALTH CARE EDUCATION/TRAINING PROGRAM

## 2023-08-07 PROCEDURE — 55700 PR BIOPSY OF PROSTATE,NEEDLE/PUNCH: CPT | Performed by: STUDENT IN AN ORGANIZED HEALTH CARE EDUCATION/TRAINING PROGRAM

## 2023-08-07 PROCEDURE — 76942 ECHO GUIDE FOR BIOPSY: CPT | Performed by: STUDENT IN AN ORGANIZED HEALTH CARE EDUCATION/TRAINING PROGRAM

## 2023-08-07 PROCEDURE — 2894A SURGICAL PATHOLOGY EXAM: CPT | Performed by: PATHOLOGY

## 2023-08-07 RX ORDER — GENTAMICIN 40 MG/ML
80 INJECTION, SOLUTION INTRAMUSCULAR; INTRAVENOUS ONCE
Status: COMPLETED | OUTPATIENT
Start: 2023-08-07 | End: 2023-08-07

## 2023-08-07 RX ORDER — LIDOCAINE HYDROCHLORIDE 10 MG/ML
20 INJECTION, SOLUTION INFILTRATION; PERINEURAL ONCE
Status: COMPLETED | OUTPATIENT
Start: 2023-08-07 | End: 2023-08-07

## 2023-08-07 RX ADMIN — GENTAMICIN 80 MG: 40 INJECTION, SOLUTION INTRAMUSCULAR; INTRAVENOUS at 14:10

## 2023-08-07 RX ADMIN — LIDOCAINE HYDROCHLORIDE 20 ML: 10 INJECTION, SOLUTION INFILTRATION; PERINEURAL at 14:25

## 2023-08-07 ASSESSMENT — PAIN SCALES - GENERAL: PAINLEVEL: NO PAIN (0)

## 2023-08-07 NOTE — PROGRESS NOTES
PREPROCEDURE DIAGNOSIS: Elevated PSA, h/o prostate cancer    POSTPROCEDURE DIAGNOSIS: Elevated PSA, h/o prostate cancer    PROCEDURE: Transrectal ultrasound sizing and MRI - transrectal ultrasound fusion (Uronav) guided prostatic needle biopsy    SURGEON: Kevin Morales MD    ANESTHESIA: 10 mL of 1% lidocaine periprostatic block bilaterally     DESCRIPTION OF PROCEDURE: The procedure, the outcome, the anesthesia, and the risks were discussed with the patient. Informed consent was obtained and signed and a timeout was completed prior to the procedure. Digital rectal examination was performed with the below findings noted. Anesthesia was administered as noted above and the transrectal ultrasound probe was inserted, sizing was performed, MRI-TRUS fusion registration was performed and the below findings were noted. 15 core biopsies were taken as described below. The probe was then withdrawn. Patient tolerated the procedure well.     FINDINGS: Digital rectal exam reveals enlarged prostate. Total volume is 95 mL. Then 15 core biopsies were taken with 3 of the MRI target, one at each mid and lateral base, mid and apical regions of the prostate bilaterally.    PLAN: We will release the results on SET as soon as they are available. He has known prostate cancer on active surveillance. We will return for discussion as scheduled    Kevin Morales MD   [facility]  [phone] clinic phone

## 2023-08-07 NOTE — PATIENT INSTRUCTIONS
[facility]  Transrectal Ultrasound  Post Operative Information    The physician who performed your Transrectal Ultrasound is Dr. Morales (telephone number [phone] 8-4:30pm after hours please call [phone].  Please contact this doctor if you have any problems or questions.  If unable to reach your doctor, please return to the Emergency Department.    Take one antibiotic the evening of the procedure and then as directed on your prescription.  Drink at least 6-8 glasses of fluids for the first 48 hours.  Avoid heavy lifting and strenuous activity for 48 hours.  Avoid sexual intercourse for the first 24 hours.  No aspirin or ibuprofen products (Motrin, Advil, Nuprin, ect.) for one week.  You may take acetaminophen (Tylenol) for pain. You may take 2-500mg extra strength tylenol every 6 hours, not to exceed the daily recommendation of 4,000mg.   You may notice a small amount of blood on the tissue after a bowel movement.  You may pass blood with clots in your urine following the procedure.  The amount will decrease with time but may be visible for up to two weeks. If you have trouble urinating due to a possible blood clot, please call our office.  You make have blood in your semen for 4 weeks after the procedure.  You may experience mild perineal (groin area) discomfort after the procedure. If you were not prescribed a sedative, you may take a tub soak to alleviate groin discomfort.   Please call you doctor if you have any of the follow symptoms:  Fever over 101.1  Increase in the amount of blood passed, dark-red blood cherry color urine/Trouble Urinating  Severe discomfort or pain not well managed with methods above

## 2023-08-07 NOTE — LETTER
8/7/2023       RE: Jaswinder Monroe  [address] [place] [address]     Dear Colleague,    Thank you for referring your patient, Jaswinder Monroe, to the [facility] UROLOGY CLINIC [place] at [facility]. Please see a copy of my visit note below.    PREPROCEDURE DIAGNOSIS: Elevated PSA, h/o prostate cancer    POSTPROCEDURE DIAGNOSIS: Elevated PSA, h/o prostate cancer    PROCEDURE: Transrectal ultrasound sizing and MRI - transrectal ultrasound fusion (Uronav) guided prostatic needle biopsy    SURGEON: Kevin Morales MD    ANESTHESIA: 10 mL of 1% lidocaine periprostatic block bilaterally     DESCRIPTION OF PROCEDURE: The procedure, the outcome, the anesthesia, and the risks were discussed with the patient. Informed consent was obtained and signed and a timeout was completed prior to the procedure. Digital rectal examination was performed with the below findings noted. Anesthesia was administered as noted above and the transrectal ultrasound probe was inserted, sizing was performed, MRI-TRUS fusion registration was performed and the below findings were noted. 15 core biopsies were taken as described below. The probe was then withdrawn. Patient tolerated the procedure well.     FINDINGS: Digital rectal exam reveals enlarged prostate. Total volume is 95 mL. Then 15 core biopsies were taken with 3 of the MRI target, one at each mid and lateral base, mid and apical regions of the prostate bilaterally.    PLAN: We will release the results on [email or web address] as soon as they are available. He has known prostate cancer on active surveillance. We will return for discussion as scheduled    Kevin Morales MD   [facility]   [phone] clinic phone

## 2023-08-07 NOTE — NURSING NOTE
Chief Complaint   Patient presents with    Elevated PSA     Patient is here for Transrectal Ultrasound/MRI Guided Prostate Biopsies      Procedure was explained to patient prior to performing said procedure.  The patient signed the [place] consent form and all questions were answered prior to the procedure.  Any pre-procedural antibiotics were given according to the performing physician's recommendation.  Patient reviewed information on the labels attached to samples and confirmed the accuracy of all of the labels.     Performing Physician:  Dr. Morales  Antibiotic taken?  yes  Aspirin or other blood thinning medications discontinued 7-10 days:  yes  Time of enema:  10:30am  Patient given Gentamicin intramuscular injection 30 minutes prior to procedure  Yes  The following medication was given:     MEDICATION: Gentamicin   ROUTE: IM  SITE: LUQ - Gluteus  DOSE:80mg/2ml  LOT #: [id number]  : E-nterview  EXPIRATION DATE: 07/24  NDC#:  05112-612-02  Was there drug waste? No  Multi-dose vial: Yes     Using a 1 1/2 inch 21 guage needle medication drawn up as ordered with sterile syringe. Using sterile technique, a new 1 1/2 needle 21 gauge placed on syringe and patient cleansed with alcohol pad. Site was mapped out using palm of hand on the greater trochanter and forefinger on iliac crest. Using V technique between middle finger and index finger. Skin was tracted and Injection was given using a 90 degree angle dart method and after aspiration of needle and no blood, medication was slowly given via IM injection.  Patient tolerated injection well, and bandage placed on site following insertion removal.    The following medication was given by MD:     MEDICATION:  Lidocaine 1% Soln  ROUTE: Local Infiltration   SITE: Prostate  DOSE: 200mg/20ml  LOT #: [id number]  : E-nterview  EXPIRATION DATE: 07/24  NDC#: 12933-443-69  Was there drug waste? No  Multi-dose vial: Yes     Twelve samples were taken from  the right and left, medial and lateral base, mid and apex of the prostate respectively. Three  additional samples were taken from Target Area .  Vitals were repeated prior to patient leaving and instructions for post TRUS care were explained to the patient.      Anum Kraft LPN

## 2023-08-11 ENCOUNTER — VIRTUAL VISIT (OUTPATIENT)
Dept: UROLOGY | Facility: CLINIC | Age: 68
End: 2023-08-11
Payer: COMMERCIAL

## 2023-08-11 VITALS — BODY MASS INDEX: 30.54 KG/M2 | HEIGHT: 74 IN | WEIGHT: 238 LBS

## 2023-08-11 DIAGNOSIS — C61 PROSTATE CANCER (H): Primary | ICD-10-CM

## 2023-08-11 LAB
PATH REPORT.COMMENTS IMP SPEC: ABNORMAL
PATH REPORT.COMMENTS IMP SPEC: YES
PATH REPORT.FINAL DX SPEC: ABNORMAL
PATH REPORT.GROSS SPEC: ABNORMAL
PATH REPORT.MICROSCOPIC SPEC OTHER STN: ABNORMAL
PATH REPORT.MICROSCOPIC SPEC OTHER STN: ABNORMAL
PATH REPORT.RELEVANT HX SPEC: ABNORMAL
PHOTO IMAGE: ABNORMAL

## 2023-08-11 PROCEDURE — 99214 OFFICE O/P EST MOD 30 MIN: CPT | Mod: VID | Performed by: STUDENT IN AN ORGANIZED HEALTH CARE EDUCATION/TRAINING PROGRAM

## 2023-08-11 ASSESSMENT — PAIN SCALES - GENERAL: PAINLEVEL: NO PAIN (0)

## 2023-08-11 NOTE — NURSING NOTE
Chief Complaint   Patient presents with    Elevated PSA     Review pathology results     Lisa Gill, EMT

## 2023-08-11 NOTE — LETTER
"8/11/2023       RE: Jaswinder Monroe  [address] [place] [address]     Dear Colleague,    Thank you for referring your patient, Jaswinder Monroe, to the [facility] UROLOGY CLINIC [place] at [facility]. Please see a copy of my visit note below.    **TEXT LINK TO CELL, [phone]**      Jaswinder is a 68 year old who is being evaluated via a billable video visit.      How would you like to obtain your AVS? MyChart    If the video visit is dropped, the invitation should be resent by: Text to cell phone: [phone]    Will anyone else be joining your video visit? No          CHIEF COMPLAINT   Jaswinder Monroe who is a 68 year old male returns today for follow-up of + FH prostate cancer, prostate cancer on active surveillance (iPSA 6.0 ng/ml, [place] 3+3=6, 3/15 cores, dx 6/15/22), here for follow up after repeat biopsy 8/7/2023 now with Renae 3+4=7    HPI   Jaswinder Monroe is a 68 year old male returns today for follow-up of + FH prostate cancer, prostate cancer on active surveillance (iPSA 6.0 ng/ml, [place] 3+3=6, 3/15 cores, dx 6/15/22), here for follow up after repeat biopsy 8/7/2023 now with [place] 3+4=7 on repeat biopsy    Patient is having some erectile dysfunction currently, is hard to get and stay erect but is not on any medications for this    PHYSICAL EXAM  Patient is a 68 year old  male   Vitals: Height 1.88 m (6' 2\"), weight 108 kg (238 lb).  Body mass index is 30.56 kg/m .  General Appearance Adult:   Alert, no acute distress, oriented  HENT: throat/mouth:normal, good dentition  Lungs: no respiratory distress, or pursed lip breathing  Heart: No obvious jugular venous distension present  Abdomen: nondistended  Musculoskeltal: extremities normal, no peripheral edema  Skin: no suspicious lesions or rashes  Neuro: Alert, oriented, speech and mentation normal  Psych: affect and mood normal    All pertinent imaging reviewed:    All imaging " studies reviewed by me.    Mri prostate 7/16/2023  IMPRESSION:  1. Based on the most suspicious abnormality, this exam is  characterized as PIRADS 5 - Clinically significant cancer is highly  likely to be present.  The most suspicious abnormality is located at  the right mid gland peripheral zone at the 6:30-9 o'clock position  relative to the urethra and there is short segment capsular abutment  with low likelihood of minimal extraprostatic extension. The lesion is  not significantly changed from 5/8/22 MRI.  2. No suspicious adenopathy or evidence of pelvic metastases.       Surgical pathology 8/7/2023  Final Diagnosis   A.  Prostate gland, left lateral base, needle biopsy:  - Negative for malignancy.     B.  Prostate gland, left base, needle biopsy:  - Negative for malignancy.     C.  Prostate gland, left lateral mid, needle biopsy:  - - Negative for malignancy.     D.  Prostate gland, left mid, needle biopsy:  - Negative for malignancy.     E.  Prostate gland, left lateral apex, needle biopsy:  - Negative for malignancy.     F.  Prostate gland, left apex, needle biopsy:  - Negative for malignancy.     G.  Prostate gland, right lateral base, needle biopsy:  - Negative for malignancy.     H.  Prostate gland, right base, needle biopsy:  - Negative for malignancy.     I.  Prostate gland, right lateral mid, needle biopsy:  - Prostatic acinar adenocarcinoma.      - Renae score:  7 (3+4) with grade 4 comprising <5% of the tumor     - WHO Grade Group:  2     - Proportion of tissue involved by tumor:  20%     - Number of tissue cores involved by tumor:  1 (out of 1)     - Angiolymphatic invasion:  Not identified     - Perineural invasion:  Not identified     J.  Prostate gland, right mid, needle biopsy:  - Prostatic acinar adenocarcinoma.      - [place] score:  6 (3+3)     - WHO Grade Group:  1     - Proportion of tissue involved by tumor:  25%     - Number of tissue cores involved by tumor:  1 (out of 1)     -  Angiolymphatic invasion:  Not identified     - Perineural invasion:  Not identified     K.  Prostate gland, right lateral apex, needle biopsy:  - Negative for malignancy.     L.  Prostate gland, right apex, needle biopsy:  - Negative for malignancy.     M.  Prostate gland, target lesion, needle biopsies X3:  - Prostatic acinar adenocarcinoma.      - Renae score:  7 (3+4) with grade 4 comprising <1% of the tumor     - WHO Grade Group:   2     - Proportion of tissue involved by tumor: 70%     - Number of tissue cores involved by tumor:  3 (out of 3)     - Angiolymphatic invasion:  Not identified     - Perineural invasion:  Not identified       ASSESSMENT and PLAN  68 year old male returns today for follow-up of + FH prostate cancer, prostate cancer on active surveillance (iPSA 6.0 ng/ml, Renae 3+3=6, 3/15 cores, dx 6/15/22), here for follow up after repeat biopsy 8/7/2023 now with [place] 3+4=7 on repeat biopsy. This recategorizes patient from low risk to intermediate favorable risk.    We had an extensive discussion about the significance of intermediate favorable risk clinically localized prostate cancer. We discussed the options for treatment of a localized prostate cancer including active surveillance, brachytherapy, external beam radiation therapy, and surgical removal. We discussed that based on his Renae score he could still be a candidate for active surveillance if genomic testing confirms low risk (he has mostly pattern 3 disease with only a minor amount of pattern 4). I also discussed that he could be a candidate for focal therapy but this is not currently offered at [facility]      We discussed the relative merits of robotic assisted radical prostatectomy. The anticipated post-operative course was explained, including an anticipated 1-2 day hospital stay. Catheter will remain in place 10-14 days.      We discussed that there was no clear evidence of advantage between surgery and radiation with  regard to risk of recurrence. Regarding radiation, we discussed risks including but not limited to cancer recurrence, exacerbation of voiding symptoms or hematuria, urinary retention, incontinence, stricture of the urinary tract, induction of second malignancy, and risks of rectal symptoms or bleeding.  We discussed fact that rectal symptoms may be more common with radiation than with other treatment modalities. With radiation therapy, we also discussed the difficulties in diagnosing recurrent disease at an early stage due to variability in PSA levels and the fact that most patients are not candidates for local salvage therapy when biochemical recurrence is declared.  We also discussed the significant morbidity of post-radiation local salvage therapy in terms of perioperative complications, erectile dysfunction and urinary incontinence. With surgery, we also discussed the potential advantage over radiation therapy in that biochemical recurrence can be detected at a relatively earlier stage and that salvage radiotherapy is successful in controlling recurrent disease in a substantial proportion of patients.  We also discussed that salvage radiotherapy was associated with a considerably more favorable morbidity profile compared to local salvage therapies for recurrent disease post-radiation.       The risks, benefits, alternatives, and personnel involved with robotic prostatectomy were discussed in detail. Specifically, we discussed that risks include but are not limited to anesthetic complications including stroke, MI, DVT/PE, as well as risk of bleeding requiring transfusion, bowel injury, infection, lymphocele, ureteral injury, nerve injury, urine leak and other potentially unforseen complications. Also discussed the risk of bladder neck contracture and other urinary symptoms after procedure. In addition, we discussed risk long-term of urinary incontinence and erectile dysfunction following the procedure. Finally,  we discussed risk for adverse pathologic features, including positive surgical margins and potential for post-surgical radiation, hormone ablation and long-term need for PSA monitoring.  All questions were answered in detail.       He wishes to get Decipher testing and follow up to discuss        Kevin Morales MD   [facility]  [facility] Phone: [phone]      Video-Visit Details    Type of service:  Video Visit   Video Start Time: 4:36 PM  Video End Time:4:55 PM    Originating Location (pt. Location): Home    Distant Location (provider location):  On-site  Platform used for Video Visit: Pedro     Over 30 minutes spent on this encounter including time spent reviewing path, discussion with patient, documentation, coordinating care

## 2023-08-11 NOTE — PROGRESS NOTES
"**TEXT LINK TO CELL, [phone]**      Jaswinder is a 68 year old who is being evaluated via a billable video visit.      How would you like to obtain your AVS? MyChart    If the video visit is dropped, the invitation should be resent by: Text to cell phone: [phone]    Will anyone else be joining your video visit? No          CHIEF COMPLAINT   Jaswinder Monroe who is a 68 year old male returns today for follow-up of + FH prostate cancer, prostate cancer on active surveillance (iPSA 6.0 ng/ml, Renae 3+3=6, 3/15 cores, dx 6/15/22), here for follow up after repeat biopsy 8/7/2023 now with [place] 3+4=7    HPI   Jaswinder Monroe is a 68 year old male returns today for follow-up of + FH prostate cancer, prostate cancer on active surveillance (iPSA 6.0 ng/ml, [place] 3+3=6, 3/15 cores, dx 6/15/22), here for follow up after repeat biopsy 8/7/2023 now with Renae 3+4=7 on repeat biopsy    Patient is having some erectile dysfunction currently, is hard to get and stay erect but is not on any medications for this    PHYSICAL EXAM  Patient is a 68 year old  male   Vitals: Height 1.88 m (6' 2\"), weight 108 kg (238 lb).  Body mass index is 30.56 kg/m .  General Appearance Adult:   Alert, no acute distress, oriented  HENT: throat/mouth:normal, good dentition  Lungs: no respiratory distress, or pursed lip breathing  Heart: No obvious jugular venous distension present  Abdomen: nondistended  Musculoskeltal: extremities normal, no peripheral edema  Skin: no suspicious lesions or rashes  Neuro: Alert, oriented, speech and mentation normal  Psych: affect and mood normal    All pertinent imaging reviewed:    All imaging studies reviewed by me.    Mri prostate 7/16/2023  IMPRESSION:  1. Based on the most suspicious abnormality, this exam is  characterized as PIRADS 5 - Clinically significant cancer is highly  likely to be present.  The most suspicious abnormality is located at  the right mid gland peripheral zone at the 6:30-9 " o'clock position  relative to the urethra and there is short segment capsular abutment  with low likelihood of minimal extraprostatic extension. The lesion is  not significantly changed from 5/8/22 MRI.  2. No suspicious adenopathy or evidence of pelvic metastases.       Surgical pathology 8/7/2023  Final Diagnosis   A.  Prostate gland, left lateral base, needle biopsy:  - Negative for malignancy.     B.  Prostate gland, left base, needle biopsy:  - Negative for malignancy.     C.  Prostate gland, left lateral mid, needle biopsy:  - - Negative for malignancy.     D.  Prostate gland, left mid, needle biopsy:  - Negative for malignancy.     E.  Prostate gland, left lateral apex, needle biopsy:  - Negative for malignancy.     F.  Prostate gland, left apex, needle biopsy:  - Negative for malignancy.     G.  Prostate gland, right lateral base, needle biopsy:  - Negative for malignancy.     H.  Prostate gland, right base, needle biopsy:  - Negative for malignancy.     I.  Prostate gland, right lateral mid, needle biopsy:  - Prostatic acinar adenocarcinoma.      - Enfield score:  7 (3+4) with grade 4 comprising <5% of the tumor     - WHO Grade Group:  2     - Proportion of tissue involved by tumor:  20%     - Number of tissue cores involved by tumor:  1 (out of 1)     - Angiolymphatic invasion:  Not identified     - Perineural invasion:  Not identified     J.  Prostate gland, right mid, needle biopsy:  - Prostatic acinar adenocarcinoma.      - Renae score:  6 (3+3)     - WHO Grade Group:  1     - Proportion of tissue involved by tumor:  25%     - Number of tissue cores involved by tumor:  1 (out of 1)     - Angiolymphatic invasion:  Not identified     - Perineural invasion:  Not identified     K.  Prostate gland, right lateral apex, needle biopsy:  - Negative for malignancy.     L.  Prostate gland, right apex, needle biopsy:  - Negative for malignancy.     M.  Prostate gland, target lesion, needle biopsies X3:  -  Prostatic acinar adenocarcinoma.      - Renae score:  7 (3+4) with grade 4 comprising <1% of the tumor     - WHO Grade Group:   2     - Proportion of tissue involved by tumor: 70%     - Number of tissue cores involved by tumor:  3 (out of 3)     - Angiolymphatic invasion:  Not identified     - Perineural invasion:  Not identified       ASSESSMENT and PLAN  68 year old male returns today for follow-up of + FH prostate cancer, prostate cancer on active surveillance (iPSA 6.0 ng/ml, [place] 3+3=6, 3/15 cores, dx 6/15/22), here for follow up after repeat biopsy 8/7/2023 now with Renae 3+4=7 on repeat biopsy. This recategorizes patient from low risk to intermediate favorable risk.    We had an extensive discussion about the significance of intermediate favorable risk clinically localized prostate cancer. We discussed the options for treatment of a localized prostate cancer including active surveillance, brachytherapy, external beam radiation therapy, and surgical removal. We discussed that based on his Iola score he could still be a candidate for active surveillance if genomic testing confirms low risk (he has mostly pattern 3 disease with only a minor amount of pattern 4). I also discussed that he could be a candidate for focal therapy but this is not currently offered at [facility]      We discussed the relative merits of robotic assisted radical prostatectomy. The anticipated post-operative course was explained, including an anticipated 1-2 day hospital stay. Catheter will remain in place 10-14 days.      We discussed that there was no clear evidence of advantage between surgery and radiation with regard to risk of recurrence. Regarding radiation, we discussed risks including but not limited to cancer recurrence, exacerbation of voiding symptoms or hematuria, urinary retention, incontinence, stricture of the urinary tract, induction of second malignancy, and risks of rectal symptoms or bleeding.  We  discussed fact that rectal symptoms may be more common with radiation than with other treatment modalities. With radiation therapy, we also discussed the difficulties in diagnosing recurrent disease at an early stage due to variability in PSA levels and the fact that most patients are not candidates for local salvage therapy when biochemical recurrence is declared.  We also discussed the significant morbidity of post-radiation local salvage therapy in terms of perioperative complications, erectile dysfunction and urinary incontinence. With surgery, we also discussed the potential advantage over radiation therapy in that biochemical recurrence can be detected at a relatively earlier stage and that salvage radiotherapy is successful in controlling recurrent disease in a substantial proportion of patients.  We also discussed that salvage radiotherapy was associated with a considerably more favorable morbidity profile compared to local salvage therapies for recurrent disease post-radiation.       The risks, benefits, alternatives, and personnel involved with robotic prostatectomy were discussed in detail. Specifically, we discussed that risks include but are not limited to anesthetic complications including stroke, MI, DVT/PE, as well as risk of bleeding requiring transfusion, bowel injury, infection, lymphocele, ureteral injury, nerve injury, urine leak and other potentially unforseen complications. Also discussed the risk of bladder neck contracture and other urinary symptoms after procedure. In addition, we discussed risk long-term of urinary incontinence and erectile dysfunction following the procedure. Finally, we discussed risk for adverse pathologic features, including positive surgical margins and potential for post-surgical radiation, hormone ablation and long-term need for PSA monitoring.  All questions were answered in detail.       He wishes to get Decipher testing and follow up to discuss        Kevin REYES  MD Carmen   [facility]  [facility] Phone: [phone]      Video-Visit Details    Type of service:  Video Visit   Video Start Time: 4:36 PM  Video End Time:4:55 PM    Originating Location (pt. Location): Home    Distant Location (provider location):  On-site  Platform used for Video Visit: Pedro     Over 30 minutes spent on this encounter including time spent reviewing path, discussion with patient, documentation, coordinating care

## 2023-09-11 LAB — SCANNED LAB RESULT: NORMAL

## 2023-09-15 ENCOUNTER — TELEPHONE (OUTPATIENT)
Dept: UROLOGY | Facility: CLINIC | Age: 68
End: 2023-09-15
Payer: COMMERCIAL

## 2023-09-15 NOTE — TELEPHONE ENCOUNTER
----- Message from Kevin Morales MD sent at 9/13/2023  2:55 PM CDT -----  Decipher low risk  Follow up in 3 months with repeat PSA prior    Kevin Morales MD   [facility]  [phone] clinic phone

## 2023-11-28 ENCOUNTER — DOCUMENTATION ONLY (OUTPATIENT)
Dept: LAB | Facility: CLINIC | Age: 68
End: 2023-11-28
Payer: COMMERCIAL

## 2023-11-28 DIAGNOSIS — C61 PROSTATE CANCER (H): Primary | ICD-10-CM

## 2023-11-28 NOTE — PROGRESS NOTES
Jaswinder MARTINEZ Christopheremma has an upcoming lab appointment:    Future Appointments   Date Time Provider Department Center   12/11/2023  9:15 AM [facility] LAB [facility]   12/14/2023 11:00 AM Kevin Morales MD [facility]   1/8/2024  9:30 AM Antwan Ron MD [facility]     Patient is scheduled for the following lab(s): PSA    There is no order available. Please review and place either future orders or HMPO (Review of Health Maintenance Protocol Orders), as appropriate.    There are no preventive care reminders to display for this patient.  Pita Manrique

## 2023-12-11 ENCOUNTER — LAB (OUTPATIENT)
Dept: LAB | Facility: CLINIC | Age: 68
End: 2023-12-11
Payer: COMMERCIAL

## 2023-12-11 DIAGNOSIS — C61 PROSTATE CANCER (H): ICD-10-CM

## 2023-12-11 PROCEDURE — 84153 ASSAY OF PSA TOTAL: CPT

## 2023-12-11 PROCEDURE — 36415 COLL VENOUS BLD VENIPUNCTURE: CPT

## 2023-12-12 LAB — PSA SERPL DL<=0.01 NG/ML-MCNC: 7.87 NG/ML (ref 0–4.5)

## 2023-12-21 ENCOUNTER — OFFICE VISIT (OUTPATIENT)
Dept: UROLOGY | Facility: CLINIC | Age: 68
End: 2023-12-21
Payer: COMMERCIAL

## 2023-12-21 VITALS
BODY MASS INDEX: 26.31 KG/M2 | HEIGHT: 74 IN | WEIGHT: 205 LBS | DIASTOLIC BLOOD PRESSURE: 85 MMHG | SYSTOLIC BLOOD PRESSURE: 133 MMHG | HEART RATE: 76 BPM

## 2023-12-21 DIAGNOSIS — C61 PROSTATE CANCER (H): Primary | ICD-10-CM

## 2023-12-21 PROCEDURE — 99213 OFFICE O/P EST LOW 20 MIN: CPT | Performed by: STUDENT IN AN ORGANIZED HEALTH CARE EDUCATION/TRAINING PROGRAM

## 2023-12-21 ASSESSMENT — PAIN SCALES - GENERAL: PAINLEVEL: NO PAIN (0)

## 2023-12-21 NOTE — NURSING NOTE
Chief Complaint   Patient presents with    Prostate Cancer     3 months with PSA      Julissa Lopez CMA

## 2023-12-21 NOTE — PROGRESS NOTES
"CHIEF COMPLAINT   Jaswinder Monroe who is a 68 year old male returns today for follow-up of + FH prostate cancer, prostate cancer on active surveillance (iPSA 6.0 ng/ml, Renae 3+3=6, 3/15 cores, dx 6/15/22), now with [place] 3+4=7 on repeat biopsy 8/7/2023    HPI   Jaswinder Monroe is a 68 year old male returns today for follow-up of + FH prostate cancer, prostate cancer on active surveillance (iPSA 6.0 ng/ml, Renae 3+3=6, 3/15 cores, dx 6/15/22), now with Renae 3+4=7 on repeat biopsy 8/7/2023    Still has good erections but very sexually active with a partner.      PHYSICAL EXAM  Patient is a 68 year old  male   Vitals: Blood pressure 133/85, pulse 76, height 1.88 m (6' 2\"), weight 93 kg (205 lb).  Body mass index is 26.32 kg/m .  General Appearance Adult:   Alert, no acute distress, oriented  HENT: throat/mouth:normal, good dentition  Lungs: no respiratory distress, or pursed lip breathing  Heart: No obvious jugular venous distension present  Abdomen: nondistended  Musculoskeltal: extremities normal, no peripheral edema  Skin: no suspicious lesions or rashes  Neuro: Alert, oriented, speech and mentation normal  Psych: affect and mood normal  Gait: Normal          Decipher score remains low risk     PSA PSA Tumor Marker   Latest Ref Rng 0.00 - 4.00 ug/L 0.00 - 4.50 ng/mL   11/1/2017  7:55 AM 4.08 (H)     11/8/2018  8:08 AM 3.71     11/13/2019  8:15 AM 3.80     11/3/2020  11:09 AM 4.75 (H)     12/2/2020  12:13 PM 4.54 (H)     10/14/2021  12:36 PM 4.92 (H)     4/13/2022  7:27 AM 6.00 (H)     12/8/2022  2:16 PM  6.88 (H)    6/9/2023  7:27 AM  5.48 (H)    12/11/2023  9:18 AM  7.87 (H)       Legend:  (H) High    ASSESSMENT and PLAN  68 year old male returns today for follow-up of + FH prostate cancer, prostate cancer on active surveillance (iPSA 6.0 ng/ml, [place] 3+3=6, 3/15 cores, dx 6/15/22), now with [place] 3+4=7 on repeat biopsy 8/7/2023    Decipher returned as LOW risk which is good news. However, his PSA is " rising slightly now 7.87.    Patient advised of treatment options including continued active surveillance, radical prostatectomy, radiation, or focal therapies. Given all of these options, he wishes to keep on active surveillance and defer decision on a definitive therapy    Return 3 months with PSA prior    Kevin Morales MD   [facility]  [facility] Phone: [phone]

## 2023-12-21 NOTE — LETTER
"12/21/2023       RE: Jaswinder Monroe  [id number] [place] [address]     Dear Colleague,    Thank you for referring your patient, Jaswinder Monroe, to the [facility] [place] [facility]. Please see a copy of my visit note below.    CHIEF COMPLAINT   Jaswinder Monroe who is a 68 year old male returns today for follow-up of + FH prostate cancer, prostate cancer on active surveillance (iPSA 6.0 ng/ml, Renae 3+3=6, 3/15 cores, dx 6/15/22), now with Renae 3+4=7 on repeat biopsy 8/7/2023    HPI   Jaswinder Monroe is a 68 year old male returns today for follow-up of + FH prostate cancer, prostate cancer on active surveillance (iPSA 6.0 ng/ml, Renae 3+3=6, 3/15 cores, dx 6/15/22), now with Renae 3+4=7 on repeat biopsy 8/7/2023    Still has good erections but very sexually active with a partner.      PHYSICAL EXAM  Patient is a 68 year old  male   Vitals: Blood pressure 133/85, pulse 76, height 1.88 m (6' 2\"), weight 93 kg (205 lb).  Body mass index is 26.32 kg/m .  General Appearance Adult:   Alert, no acute distress, oriented  HENT: throat/mouth:normal, good dentition  Lungs: no respiratory distress, or pursed lip breathing  Heart: No obvious jugular venous distension present  Abdomen: nondistended  Musculoskeltal: extremities normal, no peripheral edema  Skin: no suspicious lesions or rashes  Neuro: Alert, oriented, speech and mentation normal  Psych: affect and mood normal  Gait: Normal          Decipher score remains low risk     PSA PSA Tumor Marker   Latest Ref Rng 0.00 - 4.00 ug/L 0.00 - 4.50 ng/mL   11/1/2017  7:55 AM 4.08 (H)     11/8/2018  8:08 AM 3.71     11/13/2019  8:15 AM 3.80     11/3/2020  11:09 AM 4.75 (H)     12/2/2020  12:13 PM 4.54 (H)     10/14/2021  12:36 PM 4.92 (H)     4/13/2022  7:27 AM 6.00 (H)     12/8/2022  2:16 PM  6.88 (H)    6/9/2023  7:27 AM  5.48 (H)    12/11/2023  9:18 AM  7.87 (H)       Legend:  (H) " High    ASSESSMENT and PLAN  68 year old male returns today for follow-up of + FH prostate cancer, prostate cancer on active surveillance (iPSA 6.0 ng/ml, Renae 3+3=6, 3/15 cores, dx 6/15/22), now with Re[place] 3+4=7 on repeat biopsy 8/7/2023    Decipher returned as LOW risk which is good news. However, his PSA is rising slightly now 7.87.    Patient advised of treatment options including continued active surveillance, radical prostatectomy, radiation, or focal therapies. Given all of these options, he wishes to keep on active surveillance and defer decision on a definitive therapy    Return 3 months with PSA prior    Kevin Morales MD   [facility]  [facility] Phone: [phone]

## 2024-01-01 ASSESSMENT — ENCOUNTER SYMPTOMS
DYSURIA: 0
EYE PAIN: 0
DIZZINESS: 0
HEARTBURN: 0
MYALGIAS: 0
NAUSEA: 0
FEVER: 0
CONSTIPATION: 0
SORE THROAT: 0
PARESTHESIAS: 0
WEAKNESS: 0
PALPITATIONS: 0
HEMATOCHEZIA: 0
HEMATURIA: 0
NERVOUS/ANXIOUS: 0
CHILLS: 0
COUGH: 0
ABDOMINAL PAIN: 0
ARTHRALGIAS: 0
SHORTNESS OF BREATH: 0
JOINT SWELLING: 0
DIARRHEA: 0
HEADACHES: 0
FREQUENCY: 1

## 2024-01-01 ASSESSMENT — ACTIVITIES OF DAILY LIVING (ADL): CURRENT_FUNCTION: NO ASSISTANCE NEEDED

## 2024-01-08 ENCOUNTER — OFFICE VISIT (OUTPATIENT)
Dept: INTERNAL MEDICINE | Facility: CLINIC | Age: 69
End: 2024-01-08
Payer: COMMERCIAL

## 2024-01-08 VITALS
WEIGHT: 211.2 LBS | SYSTOLIC BLOOD PRESSURE: 122 MMHG | DIASTOLIC BLOOD PRESSURE: 80 MMHG | OXYGEN SATURATION: 98 % | HEIGHT: 74 IN | RESPIRATION RATE: 16 BRPM | BODY MASS INDEX: 27.11 KG/M2 | HEART RATE: 72 BPM | TEMPERATURE: 98 F

## 2024-01-08 DIAGNOSIS — E78.2 MIXED HYPERLIPIDEMIA: ICD-10-CM

## 2024-01-08 DIAGNOSIS — Z00.00 WELCOME TO MEDICARE PREVENTIVE VISIT: Primary | ICD-10-CM

## 2024-01-08 DIAGNOSIS — C61 PROSTATE CANCER (H): ICD-10-CM

## 2024-01-08 DIAGNOSIS — Z13.1 SCREENING FOR DIABETES MELLITUS: ICD-10-CM

## 2024-01-08 PROCEDURE — G0402 INITIAL PREVENTIVE EXAM: HCPCS | Performed by: INTERNAL MEDICINE

## 2024-01-08 RX ORDER — SIMVASTATIN 20 MG
20 TABLET ORAL AT BEDTIME
Qty: 90 TABLET | Refills: 3 | Status: SHIPPED | OUTPATIENT
Start: 2024-01-08

## 2024-01-08 ASSESSMENT — ENCOUNTER SYMPTOMS
CHILLS: 0
HEADACHES: 0
CONSTIPATION: 0
SHORTNESS OF BREATH: 0
ABDOMINAL PAIN: 0
NERVOUS/ANXIOUS: 0
DYSURIA: 0
HEMATOCHEZIA: 0
FEVER: 0
ARTHRALGIAS: 0
PARESTHESIAS: 0
HEARTBURN: 0
MYALGIAS: 0
NAUSEA: 0
PALPITATIONS: 0
FREQUENCY: 1
DIZZINESS: 0
SORE THROAT: 0
WEAKNESS: 0
JOINT SWELLING: 0
COUGH: 0
EYE PAIN: 0
HEMATURIA: 0
DIARRHEA: 0

## 2024-01-08 ASSESSMENT — ACTIVITIES OF DAILY LIVING (ADL): CURRENT_FUNCTION: NO ASSISTANCE NEEDED

## 2024-01-08 NOTE — PATIENT INSTRUCTIONS
Patient Education   Personalized Prevention Plan  You are due for the preventive services outlined below.  Your care team is available to assist you in scheduling these services.  If you have already completed any of these items, please share that information with your care team to update in your medical record.  Health Maintenance Due   Topic Date Due     RSV VACCINE (Pregnancy & 60+) (1 - 1-dose 60+ series) Never done     Flu Vaccine (1) 09/01/2023     COVID-19 Vaccine (2 - 2023-24 season) 09/01/2023     ANNUAL REVIEW OF HM ORDERS  01/05/2024     Cholesterol Lab  01/16/2024

## 2024-01-08 NOTE — PROGRESS NOTES
"SUBJECTIVE:   Jaswinder is a 68 year old, presenting for the following:  Medicare Visit    Are you in the first 12 months of your Medicare coverage?  Yes,  Visual Acuity:  Right Eye: 10/16   Left Eye: 10/25  Both Eyes: 10/12.5    Healthy Habits:     In general, how would you rate your overall health?  Excellent    Frequency of exercise:  2-3 days/week    Duration of exercise:  30-45 minutes    Do you usually eat at least 4 servings of fruit and vegetables a day, include whole grains    & fiber and avoid regularly eating high fat or \"junk\" foods?  Yes    Taking medications regularly:  Yes    Medication side effects:  None    Ability to successfully perform activities of daily living:  No assistance needed    Home Safety:  No safety concerns identified    Hearing Impairment:  No hearing concerns    In the past 6 months, have you been bothered by leaking of urine?  No    In general, how would you rate your overall mental or emotional health?  Excellent    Additional concerns today:  No      Today's PHQ-2 Score:       1/8/2024     9:12 AM   PHQ-2 ( 1999 Pfizer)   Q1: Little interest or pleasure in doing things 0   Q2: Feeling down, depressed or hopeless 0   PHQ-2 Score 0   Q1: Little interest or pleasure in doing things Not at all   Q2: Feeling down, depressed or hopeless Not at all   PHQ-2 Score 0           Have you ever done Advance Care Planning? (For example, a Health Directive, POLST, or a discussion with a medical provider or your loved ones about your wishes): No, advance care planning information given to patient to review.  Patient plans to discuss their wishes with loved ones or provider.         Fall risk  Fallen 2 or more times in the past year?: No  Any fall with injury in the past year?: No    Cognitive Screening   1) Repeat 3 items (Leader, Season, Table)    2) Clock draw: NORMAL  3) 3 item recall: Recalls 3 objects  Results: 3 items recalled: COGNITIVE IMPAIRMENT LESS LIKELY    Mini-CogTM Copyright S Alexandre. " Licensed by the author for use in [facility]; reprinted with permission (mihaela@[facility]). All rights reserved.      Do you have sleep apnea, excessive snoring or daytime drowsiness? : no    Reviewed and updated as needed this visit by clinical staff   Tobacco  Allergies  Meds     Jaime Hx          Reviewed and updated as needed this visit by Provider         Jaime Hx         Social History     Tobacco Use    Smoking status: Never    Smokeless tobacco: Never   Substance Use Topics    Alcohol use: Yes     Alcohol/week: 4.0 standard drinks of alcohol     Types: 4 Standard drinks or equivalent per week     Comment: social             1/1/2024     9:25 PM   Alcohol Use   Prescreen: >3 drinks/day or >7 drinks/week? No     Do you have a current opioid prescription? No  Do you use any other controlled substances or medications that are not prescribed by a provider? None              Current providers sharing in care for this patient include:   Patient Care Team:  Antwan Ron MD as PCP - General (Internal Medicine)  Antwan Ron MD as Assigned PCP  Kevin Morales MD as MD (Urology)  Antwan Ron MD as Referring Physician (Internal Medicine)  Kevin Morales MD as Assigned Surgical Provider  Milena Day APRN CNP as Nurse Practitioner (Dermatology)    The following health maintenance items are reviewed in Epic and correct as of today:  Health Maintenance   Topic Date Due    RSV VACCINE (Pregnancy & 60+) (1 - 1-dose 60+ series) Never done    INFLUENZA VACCINE (1) 09/01/2023    COVID-19 Vaccine (2 - 2023-24 season) 09/01/2023    LIPID  01/16/2024    DTAP/TDAP/TD IMMUNIZATION (2 - Td or Tdap) 06/01/2024    MEDICARE ANNUAL WELLNESS VISIT  01/08/2025    ANNUAL REVIEW OF HM ORDERS  01/08/2025    FALL RISK ASSESSMENT  01/08/2025    COLORECTAL CANCER SCREENING  02/16/2028    ADVANCE CARE PLANNING  01/08/2029    HEPATITIS C SCREENING  Completed    PHQ-2 (once per calendar year)  Completed  "   Pneumococcal Vaccine: 65+ Years  Completed    ZOSTER IMMUNIZATION  Completed    IPV IMMUNIZATION  Aged Out    HPV IMMUNIZATION  Aged Out    MENINGITIS IMMUNIZATION  Aged Out    RSV MONOCLONAL ANTIBODY  Aged Out    AORTIC ANEURYSM SCREENING (SYSTEM ASSIGNED)  Discontinued               Review of Systems   Constitutional:  Negative for chills and fever.   HENT:  Negative for congestion, ear pain, hearing loss and sore throat.    Eyes:  Negative for pain and visual disturbance.   Respiratory:  Negative for cough and shortness of breath.    Cardiovascular:  Negative for chest pain, palpitations and peripheral edema.   Gastrointestinal:  Negative for abdominal pain, constipation, diarrhea, heartburn, hematochezia and nausea.   Genitourinary:  Positive for frequency. Negative for dysuria, genital sores, hematuria, impotence, penile discharge and urgency.   Musculoskeletal:  Negative for arthralgias, joint swelling and myalgias.   Skin:  Negative for rash.   Neurological:  Negative for dizziness, weakness, headaches and paresthesias.   Psychiatric/Behavioral:  Negative for mood changes. The patient is not nervous/anxious.          OBJECTIVE:   /80   Pulse 72   Temp 98  F (36.7  C) (Temporal)   Resp 16   Ht 1.88 m (6' 2\")   Wt 95.8 kg (211 lb 3.2 oz)   SpO2 98%   BMI 27.12 kg/m   Estimated body mass index is 27.12 kg/m  as calculated from the following:    Height as of this encounter: 1.88 m (6' 2\").    Weight as of this encounter: 95.8 kg (211 lb 3.2 oz).  Physical Exam  GENERAL: healthy, alert and no distress  EYES: Eyes grossly normal to inspection, PERRL and conjunctivae and sclerae normal  HENT: normal cephalic/atraumatic, nose and mouth without ulcers or lesions, oropharynx clear, and oral mucous membranes moist  NECK: no adenopathy, no asymmetry, masses, or scars and thyroid normal to palpation  RESP: lungs clear to auscultation - no rales, rhonchi or wheezes  CV: regular rate and rhythm, normal S1 " "S2, no S3 or S4, no murmur, click or rub, no peripheral edema and peripheral pulses strong  ABDOMEN: soft, nontender, no hepatosplenomegaly, no masses and bowel sounds normal  MS: no gross musculoskeletal defects noted, no edema  SKIN: no suspicious lesions or rashes  NEURO: Normal strength and tone, mentation intact and speech normal  LYMPH: no cervical, supraclavicular, axillary, or inguinal adenopathy        ASSESSMENT / PLAN:       ICD-10-CM    1. Welcome to Medicare preventive visit  Z00.00       2. Mixed hyperlipidemia- 10 yr CV risk 10.1%  E78.2 simvastatin (ZOCOR) 20 MG tablet      3. Prostate cancer (H)  C61       4. Screening for diabetes mellitus  Z13.1 Basic metabolic panel  (Ca, Cl, CO2, Creat, Gluc, K, Na, BUN)        -Updated screening, immunizations, prevention.  Please see health maintenance list, care gaps  -cont statin for primary prevention  -prostate ca active surveillance per urology    Patient has been advised of split billing requirements and indicates understanding: Yes      COUNSELING:  Reviewed preventive health counseling, as reflected in patient instructions       Regular exercise       Healthy diet/nutrition      BMI:   Estimated body mass index is 27.12 kg/m  as calculated from the following:    Height as of this encounter: 1.88 m (6' 2\").    Weight as of this encounter: 95.8 kg (211 lb 3.2 oz).         He reports that he has never smoked. He has never used smokeless tobacco.      Appropriate preventive services were discussed with this patient, including applicable screening as appropriate for fall prevention, nutrition, physical activity, Tobacco-use cessation, weight loss and cognition.  Checklist reviewing preventive services available has been given to the patient.    Reviewed patients plan of care and provided an AVS. The Basic Care Plan (routine screening as documented in Health Maintenance) for Jaswinder meets the Care Plan requirement. This Care Plan has been established and " reviewed with the Patient.          Antwan Ron MD  [facility]    Identified Health Risks:  I have reviewed Opioid Use Disorder and Substance Use Disorder risk factors and made any needed referrals.

## 2024-01-22 ENCOUNTER — LAB (OUTPATIENT)
Dept: LAB | Facility: CLINIC | Age: 69
End: 2024-01-22
Attending: INTERNAL MEDICINE
Payer: COMMERCIAL

## 2024-01-22 DIAGNOSIS — Z13.1 SCREENING FOR DIABETES MELLITUS: ICD-10-CM

## 2024-01-22 DIAGNOSIS — R97.20 ELEVATED PROSTATE SPECIFIC ANTIGEN (PSA): ICD-10-CM

## 2024-01-22 LAB
ANION GAP SERPL CALCULATED.3IONS-SCNC: 9 MMOL/L (ref 7–15)
BUN SERPL-MCNC: 18.5 MG/DL (ref 8–23)
CALCIUM SERPL-MCNC: 9.4 MG/DL (ref 8.8–10.2)
CHLORIDE SERPL-SCNC: 103 MMOL/L (ref 98–107)
CHOLEST SERPL-MCNC: 145 MG/DL
CREAT SERPL-MCNC: 1.11 MG/DL (ref 0.67–1.17)
DEPRECATED HCO3 PLAS-SCNC: 27 MMOL/L (ref 22–29)
EGFRCR SERPLBLD CKD-EPI 2021: 73 ML/MIN/1.73M2
FASTING STATUS PATIENT QL REPORTED: YES
GLUCOSE SERPL-MCNC: 98 MG/DL (ref 70–99)
HDLC SERPL-MCNC: 50 MG/DL
LDLC SERPL CALC-MCNC: 76 MG/DL
NONHDLC SERPL-MCNC: 95 MG/DL
POTASSIUM SERPL-SCNC: 4.3 MMOL/L (ref 3.4–5.3)
PSA SERPL DL<=0.01 NG/ML-MCNC: 6.8 NG/ML (ref 0–4.5)
SODIUM SERPL-SCNC: 139 MMOL/L (ref 135–145)
TRIGL SERPL-MCNC: 95 MG/DL

## 2024-01-22 PROCEDURE — 80048 BASIC METABOLIC PNL TOTAL CA: CPT

## 2024-01-22 PROCEDURE — 80061 LIPID PANEL: CPT

## 2024-01-22 PROCEDURE — 84153 ASSAY OF PSA TOTAL: CPT

## 2024-01-22 PROCEDURE — 36415 COLL VENOUS BLD VENIPUNCTURE: CPT

## 2024-03-18 ENCOUNTER — DOCUMENTATION ONLY (OUTPATIENT)
Dept: LAB | Facility: CLINIC | Age: 69
End: 2024-03-18
Payer: COMMERCIAL

## 2024-03-18 DIAGNOSIS — R97.20 ELEVATED PROSTATE SPECIFIC ANTIGEN (PSA): Primary | ICD-10-CM

## 2024-03-18 NOTE — PROGRESS NOTES
Jaswinder MARTINEZ Christopheremma has an upcoming lab appointment:    Future Appointments   Date Time Provider Department Center   3/25/2024  8:00 AM [facility] LAB [facility]   3/28/2024 10:00 AM Kevin Morales MD [facility]   1/20/2025  8:30 AM Antwan Ron MD [facility] OX     Patient is scheduled for the following lab(s): PSA    There is no order available. Please review and place either future orders or HMPO (Review of Health Maintenance Protocol Orders), as appropriate.    There are no preventive care reminders to display for this patient.  Pita Manrique

## 2024-03-25 ENCOUNTER — LAB (OUTPATIENT)
Dept: LAB | Facility: CLINIC | Age: 69
End: 2024-03-25
Payer: COMMERCIAL

## 2024-03-25 DIAGNOSIS — R97.20 ELEVATED PROSTATE SPECIFIC ANTIGEN (PSA): ICD-10-CM

## 2024-03-25 LAB — PSA SERPL DL<=0.01 NG/ML-MCNC: 6.54 NG/ML (ref 0–4.5)

## 2024-03-25 PROCEDURE — 84153 ASSAY OF PSA TOTAL: CPT

## 2024-03-25 PROCEDURE — 36415 COLL VENOUS BLD VENIPUNCTURE: CPT

## 2024-03-28 ENCOUNTER — OFFICE VISIT (OUTPATIENT)
Dept: UROLOGY | Facility: CLINIC | Age: 69
End: 2024-03-28
Payer: COMMERCIAL

## 2024-03-28 VITALS
HEIGHT: 74 IN | DIASTOLIC BLOOD PRESSURE: 82 MMHG | BODY MASS INDEX: 26.56 KG/M2 | WEIGHT: 207 LBS | SYSTOLIC BLOOD PRESSURE: 124 MMHG

## 2024-03-28 DIAGNOSIS — C61 PROSTATE CANCER (H): Primary | ICD-10-CM

## 2024-03-28 PROCEDURE — 99213 OFFICE O/P EST LOW 20 MIN: CPT | Performed by: STUDENT IN AN ORGANIZED HEALTH CARE EDUCATION/TRAINING PROGRAM

## 2024-03-28 ASSESSMENT — PAIN SCALES - GENERAL: PAINLEVEL: NO PAIN (0)

## 2024-03-28 NOTE — LETTER
"3/28/2024       RE: Jaswinder Monroe  [id number] [place] [address]     Dear Colleague,    Thank you for referring your patient, Jaswinder Monroe, to the [facility] UROLOGY CLINIC [place] at [facility]. Please see a copy of my visit note below.    CHIEF COMPLAINT   Jaswinder Monroe who is a 68 year old male returns today for follow-up of   prostate cancer, prostate cancer on active surveillance (iPSA 6.0 ng/ml, [place] 3+3=6, 3/15 cores, dx 6/15/22), now with Renae 3+4=7 on repeat biopsy 8/7/2023, Decipher LOW risk, on active surveillance    HPI   Jaswinder Monroe is a 68 year old male returns today for follow-up of  FH prostate cancer, prostate cancer on active surveillance (iPSA 6.0 ng/ml, [place] 3+3=6, 3/15 cores, dx 6/15/22), now with [place] 3+4=7 on repeat biopsy 8/7/2023, Decipher LOW risk, on active surveillance    He saw [facility] for an opinion regarding focal therapy with ablation, but they did not specify a technique (note mentioned cryo, focal laser, HIFU, IR, light activated nanoparticles)    He denies any symptoms    PHYSICAL EXAM  Patient is a 68 year old  male   Vitals: Blood pressure 124/82, height 1.88 m (6' 2\"), weight 93.9 kg (207 lb).  Body mass index is 26.58 kg/m .  General Appearance Adult:   Alert, no acute distress, oriented  HENT: throat/mouth:normal, good dentition  Lungs: no respiratory distress, or pursed lip breathing  Heart: No obvious jugular venous distension present  Abdomen: nondistended  Musculoskeltal: extremities normal, no peripheral edema  Skin: no suspicious lesions or rashes  Neuro: Alert, oriented, speech and mentation normal  Psych: affect and mood normal  Gait: Normal  : deferred       PSA PSA Tumor Marker   Latest Ref Rng 0.00 - 4.00 ug/L 0.00 - 4.50 ng/mL   11/1/2017  7:55 AM 4.08 (H)     11/8/2018  8:08 AM 3.71     11/13/2019  8:15 AM 3.80     11/3/2020  11:09 AM 4.75 (H)     12/2/2020  12:13 PM " 4.54 (H)     10/14/2021  12:36 PM 4.92 (H)     4/13/2022  7:27 AM 6.00 (H)     12/8/2022  2:16 PM  6.88 (H)    6/9/2023  7:27 AM  5.48 (H)    12/11/2023  9:18 AM  7.87 (H)    1/22/2024  8:53 AM  6.80 (H)    3/25/2024  7:59 AM  6.54 (H)       Legend:  (H) High    ASSESSMENT and PLAN   68 year old male returns today for follow-up of  FH prostate cancer, prostate cancer on active surveillance (iPSA 6.0 ng/ml, Renae 3+3=6, 3/15 cores, dx 6/15/22), now with Renae 3+4=7 on repeat biopsy 8/7/2023, Decipher LOW risk, on active surveillance    PSA down slightly from prior peak now in the mid 6 range (iPSA 6.0).    Saw [facility] for opinion regarding ablation and he was told he would be a good candidate if he chose to proceed. For now he wants to continue active surveillance. If PSA rises or MRI shows more concern, could either rebiopsy or proceed to treatment    - return 6 months with PSA, repeat MRI prior    Kevin Morales MD   [facility] Urology  [facility] Phone: [phone]

## 2024-03-28 NOTE — PROGRESS NOTES
"CHIEF COMPLAINT   Jaswinder Monroe who is a 68 year old male returns today for follow-up of   prostate cancer, prostate cancer on active surveillance (iPSA 6.0 ng/ml, [place] 3+3=6, 3/15 cores, dx 6/15/22), now with [place] 3+4=7 on repeat biopsy 8/7/2023, Decipher LOW risk, on active surveillance    HPI   Jaswinder Monroe is a 68 year old male returns today for follow-up of   prostate cancer, prostate cancer on active surveillance (iPSA 6.0 ng/ml, Renae 3+3=6, 3/15 cores, dx 6/15/22), now with Renae 3+4=7 on repeat biopsy 8/7/2023, Decipher LOW risk, on active surveillance    He saw [facility] for an opinion regarding focal therapy with ablation, but they did not specify a technique (note mentioned cryo, focal laser, HIFU, IR, light activated nanoparticles)    He denies any symptoms    PHYSICAL EXAM  Patient is a 68 year old  male   Vitals: Blood pressure 124/82, height 1.88 m (6' 2\"), weight 93.9 kg (207 lb).  Body mass index is 26.58 kg/m .  General Appearance Adult:   Alert, no acute distress, oriented  HENT: throat/mouth:normal, good dentition  Lungs: no respiratory distress, or pursed lip breathing  Heart: No obvious jugular venous distension present  Abdomen: nondistended  Musculoskeltal: extremities normal, no peripheral edema  Skin: no suspicious lesions or rashes  Neuro: Alert, oriented, speech and mentation normal  Psych: affect and mood normal  Gait: Normal  : deferred       PSA PSA Tumor Marker   Latest Ref Rng 0.00 - 4.00 ug/L 0.00 - 4.50 ng/mL   11/1/2017  7:55 AM 4.08 (H)     11/8/2018  8:08 AM 3.71     11/13/2019  8:15 AM 3.80     11/3/2020  11:09 AM 4.75 (H)     12/2/2020  12:13 PM 4.54 (H)     10/14/2021  12:36 PM 4.92 (H)     4/13/2022  7:27 AM 6.00 (H)     12/8/2022  2:16 PM  6.88 (H)    6/9/2023  7:27 AM  5.48 (H)    12/11/2023  9:18 AM  7.87 (H)    1/22/2024  8:53 AM  6.80 (H)    3/25/2024  7:59 AM  6.54 (H)       Legend:  (H) High    ASSESSMENT and PLAN   68 year old male returns " today for follow-up of  FH prostate cancer, prostate cancer on active surveillance (iPSA 6.0 ng/ml, Renae 3+3=6, 3/15 cores, dx 6/15/22), now with [place] 3+4=7 on repeat biopsy 8/7/2023, Decipher LOW risk, on active surveillance    PSA down slightly from prior peak now in the mid 6 range (iPSA 6.0).    Saw [facility] for opinion regarding ablation and he was told he would be a good candidate if he chose to proceed. For now he wants to continue active surveillance. If PSA rises or MRI shows more concern, could either rebiopsy or proceed to treatment    - return 6 months with PSA, repeat MRI prior    Kevin Morales MD   [facility] Urology  [facility] Phone: [phone]

## 2024-10-14 ENCOUNTER — MYC MEDICAL ADVICE (OUTPATIENT)
Dept: INTERNAL MEDICINE | Facility: CLINIC | Age: 69
End: 2024-10-14

## 2024-10-14 ENCOUNTER — ANCILLARY PROCEDURE (OUTPATIENT)
Dept: MRI IMAGING | Facility: CLINIC | Age: 69
End: 2024-10-14
Attending: STUDENT IN AN ORGANIZED HEALTH CARE EDUCATION/TRAINING PROGRAM
Payer: COMMERCIAL

## 2024-10-14 ENCOUNTER — LAB (OUTPATIENT)
Dept: LAB | Facility: CLINIC | Age: 69
End: 2024-10-14
Payer: COMMERCIAL

## 2024-10-14 DIAGNOSIS — C61 PROSTATE CANCER (H): ICD-10-CM

## 2024-10-14 LAB — PSA SERPL DL<=0.01 NG/ML-MCNC: 9.19 NG/ML (ref 0–4.5)

## 2024-10-14 PROCEDURE — 84153 ASSAY OF PSA TOTAL: CPT | Performed by: PATHOLOGY

## 2024-10-14 PROCEDURE — 72197 MRI PELVIS W/O & W/DYE: CPT | Performed by: STUDENT IN AN ORGANIZED HEALTH CARE EDUCATION/TRAINING PROGRAM

## 2024-10-14 PROCEDURE — A9585 GADOBUTROL INJECTION: HCPCS | Performed by: STUDENT IN AN ORGANIZED HEALTH CARE EDUCATION/TRAINING PROGRAM

## 2024-10-14 PROCEDURE — 36415 COLL VENOUS BLD VENIPUNCTURE: CPT | Performed by: PATHOLOGY

## 2024-10-14 RX ORDER — GADOBUTROL 604.72 MG/ML
10 INJECTION INTRAVENOUS ONCE
Status: COMPLETED | OUTPATIENT
Start: 2024-10-14 | End: 2024-10-14

## 2024-10-14 RX ADMIN — GADOBUTROL 9.5 ML: 604.72 INJECTION INTRAVENOUS at 10:47

## 2024-10-22 ENCOUNTER — OFFICE VISIT (OUTPATIENT)
Dept: UROLOGY | Facility: CLINIC | Age: 69
End: 2024-10-22
Payer: COMMERCIAL

## 2024-10-22 VITALS
DIASTOLIC BLOOD PRESSURE: 76 MMHG | BODY MASS INDEX: 28.49 KG/M2 | WEIGHT: 222 LBS | SYSTOLIC BLOOD PRESSURE: 122 MMHG | HEIGHT: 74 IN | HEART RATE: 70 BPM

## 2024-10-22 DIAGNOSIS — C61 PROSTATE CANCER (H): Primary | ICD-10-CM

## 2024-10-22 PROCEDURE — 99213 OFFICE O/P EST LOW 20 MIN: CPT | Performed by: STUDENT IN AN ORGANIZED HEALTH CARE EDUCATION/TRAINING PROGRAM

## 2024-10-22 ASSESSMENT — PAIN SCALES - GENERAL: PAINLEVEL: NO PAIN (0)

## 2024-10-22 NOTE — PATIENT INSTRUCTIONS
Follow up with [facility] to discuss their treatment options, and you can see us back if you want to have treatment with surgery or radiation locally

## 2024-10-22 NOTE — LETTER
"10/22/2024       RE: Jaswinder Monroe  [id number] [place] [address]     Dear Colleague,    Thank you for referring your patient, Jaswinder Monroe, to the [facility] UROLOGY CLINIC [place] at [facility]. Please see a copy of my visit note below.    CHIEF COMPLAINT   Jaswinder Monroe who is a 69 year old male returns today for follow-up of  prostate cancer, prostate cancer (iPSA 6.0 ng/ml, [place] 3+3=6, 3/15 cores, dx 6/15/22), now with [place] 3+4=7 on repeat biopsy 8/7/2023, Decipher LOW risk, on active surveillance .      HPI   Jaswinder Monroe is a 69 year old male returns today for follow-up of  prostate cancer, prostate cancer (iPSA 6.0 ng/ml, [place] 3+3=6, 3/15 cores, dx 6/15/22), now with Renae 3+4=7 on repeat biopsy 8/7/2023, Decipher LOW risk, on active surveillance .      Here for PSA and MRI followup    PHYSICAL EXAM  Patient is a 69 year old  male   Vitals: Blood pressure 122/76, pulse 70, height 1.88 m (6' 2\"), weight 100.7 kg (222 lb).  Body mass index is 28.5 kg/m .  General Appearance Adult:   Alert, no acute distress, oriented  HENT: throat/mouth:normal, good dentition  Lungs: no respiratory distress, or pursed lip breathing  Heart: No obvious jugular venous distension present  Abdomen: nondistended  Musculoskeltal: extremities normal, no peripheral edema  Skin: no suspicious lesions or rashes  Neuro: Alert, oriented, speech and mentation normal  Psych: affect and mood normal    All pertinent imaging reviewed:    MRI prostate 10/14/2024  FINDINGS:  Size: 4.7 x 6.5 x 7.4 cm, 118 grams    Lesion 1:  Location: Right apex peripheral zone at the 7-10 o'clock position  relative to the urethra. Series 7001 image 79 and series 6001 image  27.   Additional prostate regions involved: There is abutment of the  external urethral sphincter.  Size: 19 mm  T2 description:   heterogeneous signal intensity     T2 numerical assessment: 3  DWI " description: focal, marked hypointensity on ADC and marked  hyperintensity on high b-value DWI  DWI numerical assessment: 5  DCE assessment: Positive    Prostate margin: Capsular abutment>15 mm with smooth contour  Lesion overall PI-RADS category: 4     Lesion 2:  Location: Right mid gland peripheral zone at the 7 o'clock position  relative to the urethra. Series 7001 image 65.   Additional prostate regions involved: None  Size: 11 mm  T2 description: heterogeneous signal intensity  T2 numerical assessment: 3  DWI description: mild/moderate hyperintensity on high b-value DWI and  moderate hypointensity on ADC   DWI numerical assessment: 3  DCE assessment: Positive    Prostate margin: Capsular abutment 6-15 mm with smooth contour  Lesion overall PI-RADS category: 4     Neurovascular bundles: No neurovascular bundle involvement by  malignancy.  Seminal vesicles: No seminal vesicle involvement by malignancy.  Lymph nodes: No lymph node involvement  Bones: No suspicious lesions. Irregular T2 hyperintense fluid in the  right ischial bursa (series 4001 images 17-21 with marked thickening  and enhancement bursal suggestive of active inflammation.  Other pelvic organs:  Similar trabeculated thickened bladder likely  secondary to chronic lateral obstruction. Extensive colonic  diverticulosis without evidence of diverticulitis. Prominent fat in  the left greater than right inguinal canals.    IMPRESSION:  1. Based on the most suspicious abnormality, this exam is  characterized as PIRADS 5 - Clinically significant cancer is highly  likely to be present.  The most suspicious abnormality is located at  the right apex peripheral zone and there is long segment capsular  abutment indicating moderate suspicion of minimal extraprostatic  extension. There is abutment of the external urethral sphincter.     Additional PI-RADS 4 lesion in the right mid peripheral zone,  previously characterized as PI-RADS 5 and correlating with  biopsy  proven prostate cancer. Lesion has decreased in size compared to  7/16/2013 MRI.     2. No suspicious adenopathy or evidence of pelvic metastases.  3. Findings suggestive of right ischiogluteal bursitis.     PSA PSA Tumor Marker   Latest Ref Rng 0.00 - 4.00 ug/L 0.00 - 4.50 ng/mL   11/1/2017  7:55 AM 4.08 (H)     11/8/2018  8:08 AM 3.71     11/13/2019  8:15 AM 3.80     11/3/2020  11:09 AM 4.75 (H)     12/2/2020  12:13 PM 4.54 (H)     10/14/2021  12:36 PM 4.92 (H)     4/13/2022  7:27 AM 6.00 (H)     12/8/2022  2:16 PM  6.88 (H)    6/9/2023  7:27 AM  5.48 (H)    12/11/2023  9:18 AM  7.87 (H)    1/22/2024  8:53 AM  6.80 (H)    3/25/2024  7:59 AM  6.54 (H)    10/14/2024  9:17 AM  9.19 (H)       Legend:  (H) High    ASSESSMENT and PLAN   69 year old male returns today for follow-up of FH prostate cancer, prostate cancer (iPSA 6.0 ng/ml, [place] 3+3=6, 3/15 cores, dx 6/15/22), now with Renae 3+4=7 on repeat biopsy 8/7/2023, Decipher LOW risk, on active surveillance .    Prostate cancer: chronic condition with exacerbation. His PSA continues to rise and is now 9.19 ng/ml. The prostate MRI showing the biopsy proven lesion from prior is now categorized as PIRADS 4 and is somewhat smaller than previous, however there is now a new PIRADS 5 on the same side (right). I think at this point, patient should strongly consider definitive treatment with surgery, radiation, or emerging focal therapies. He is still very keen on having a focal therapy and will reach back out to [facility] again. Note that he is very interested in keeping an active sex life and this is a major priority for him    - patient to reach out to [facility]  - follow up to be determined afterwards if patient wants surgery or radiation locally        Kevin Morales MD   [facility] Urology  [facility] Phone: [phone]      Again, thank you for allowing me to participate in the care of your patient.      Sincerely,    Kevin  AMY Morales MD

## 2024-10-22 NOTE — NURSING NOTE
Chief Complaint   Patient presents with    Prostate Cancer     6 months with PSA and MRI      Julissa Lopez, CMA

## 2024-10-22 NOTE — PROGRESS NOTES
"CHIEF COMPLAINT   Jaswinder Monroe who is a 69 year old male returns today for follow-up of  prostate cancer, prostate cancer (iPSA 6.0 ng/ml, [place] 3+3=6, 3/15 cores, dx 6/15/22), now with Renae 3+4=7 on repeat biopsy 8/7/2023, Decipher LOW risk, on active surveillance .      HPI   Jaswinder Monroe is a 69 year old male returns today for follow-up of  prostate cancer, prostate cancer (iPSA 6.0 ng/ml, [place] 3+3=6, 3/15 cores, dx 6/15/22), now with Renae 3+4=7 on repeat biopsy 8/7/2023, Decipher LOW risk, on active surveillance .      Here for PSA and MRI followup    PHYSICAL EXAM  Patient is a 69 year old  male   Vitals: Blood pressure 122/76, pulse 70, height 1.88 m (6' 2\"), weight 100.7 kg (222 lb).  Body mass index is 28.5 kg/m .  General Appearance Adult:   Alert, no acute distress, oriented  HENT: throat/mouth:normal, good dentition  Lungs: no respiratory distress, or pursed lip breathing  Heart: No obvious jugular venous distension present  Abdomen: nondistended  Musculoskeltal: extremities normal, no peripheral edema  Skin: no suspicious lesions or rashes  Neuro: Alert, oriented, speech and mentation normal  Psych: affect and mood normal    All pertinent imaging reviewed:    MRI prostate 10/14/2024  FINDINGS:  Size: 4.7 x 6.5 x 7.4 cm, 118 grams    Lesion 1:  Location: Right apex peripheral zone at the 7-10 o'clock position  relative to the urethra. Series 7001 image 79 and series 6001 image  27.   Additional prostate regions involved: There is abutment of the  external urethral sphincter.  Size: 19 mm  T2 description:   heterogeneous signal intensity     T2 numerical assessment: 3  DWI description: focal, marked hypointensity on ADC and marked  hyperintensity on high b-value DWI  DWI numerical assessment: 5  DCE assessment: Positive    Prostate margin: Capsular abutment>15 mm with smooth contour  Lesion overall PI-RADS category: 4     Lesion 2:  Location: Right mid gland peripheral zone at the 7 " o'clock position  relative to the urethra. Series 7001 image 65.   Additional prostate regions involved: None  Size: 11 mm  T2 description: heterogeneous signal intensity  T2 numerical assessment: 3  DWI description: mild/moderate hyperintensity on high b-value DWI and  moderate hypointensity on ADC   DWI numerical assessment: 3  DCE assessment: Positive    Prostate margin: Capsular abutment 6-15 mm with smooth contour  Lesion overall PI-RADS category: 4     Neurovascular bundles: No neurovascular bundle involvement by  malignancy.  Seminal vesicles: No seminal vesicle involvement by malignancy.  Lymph nodes: No lymph node involvement  Bones: No suspicious lesions. Irregular T2 hyperintense fluid in the  right ischial bursa (series 4001 images 17-21 with marked thickening  and enhancement bursal suggestive of active inflammation.  Other pelvic organs:  Similar trabeculated thickened bladder likely  secondary to chronic lateral obstruction. Extensive colonic  diverticulosis without evidence of diverticulitis. Prominent fat in  the left greater than right inguinal canals.    IMPRESSION:  1. Based on the most suspicious abnormality, this exam is  characterized as PIRADS 5 - Clinically significant cancer is highly  likely to be present.  The most suspicious abnormality is located at  the right apex peripheral zone and there is long segment capsular  abutment indicating moderate suspicion of minimal extraprostatic  extension. There is abutment of the external urethral sphincter.     Additional PI-RADS 4 lesion in the right mid peripheral zone,  previously characterized as PI-RADS 5 and correlating with biopsy  proven prostate cancer. Lesion has decreased in size compared to  7/16/2013 MRI.     2. No suspicious adenopathy or evidence of pelvic metastases.  3. Findings suggestive of right ischiogluteal bursitis.     PSA PSA Tumor Marker   Latest Ref Rng 0.00 - 4.00 ug/L 0.00 - 4.50 ng/mL   11/1/2017  7:55 AM 4.08 (H)      11/8/2018  8:08 AM 3.71     11/13/2019  8:15 AM 3.80     11/3/2020  11:09 AM 4.75 (H)     12/2/2020  12:13 PM 4.54 (H)     10/14/2021  12:36 PM 4.92 (H)     4/13/2022  7:27 AM 6.00 (H)     12/8/2022  2:16 PM  6.88 (H)    6/9/2023  7:27 AM  5.48 (H)    12/11/2023  9:18 AM  7.87 (H)    1/22/2024  8:53 AM  6.80 (H)    3/25/2024  7:59 AM  6.54 (H)    10/14/2024  9:17 AM  9.19 (H)       Legend:  (H) High    ASSESSMENT and PLAN   69 year old male returns today for follow-up of FH prostate cancer, prostate cancer (iPSA 6.0 ng/ml, Renae 3+3=6, 3/15 cores, dx 6/15/22), now with Renae 3+4=7 on repeat biopsy 8/7/2023, Decipher LOW risk, on active surveillance .    Prostate cancer: chronic condition with exacerbation. His PSA continues to rise and is now 9.19 ng/ml. The prostate MRI showing the biopsy proven lesion from prior is now categorized as PIRADS 4 and is somewhat smaller than previous, however there is now a new PIRADS 5 on the same side (right). I think at this point, patient should strongly consider definitive treatment with surgery, radiation, or emerging focal therapies. He is still very keen on having a focal therapy and will reach back out to [facility] again. Note that he is very interested in keeping an active sex life and this is a major priority for him    - patient to reach out to [facility]  - follow up to be determined afterwards if patient wants surgery or radiation locally        Kevin Morales MD   [facility] Urology  [facility] Phone: [phone]

## 2025-02-26 DIAGNOSIS — E78.2 MIXED HYPERLIPIDEMIA: ICD-10-CM

## 2025-02-26 RX ORDER — SIMVASTATIN 20 MG
20 TABLET ORAL AT BEDTIME
Qty: 90 TABLET | Refills: 0 | Status: SHIPPED | OUTPATIENT
Start: 2025-02-26

## 2025-03-13 SDOH — HEALTH STABILITY: PHYSICAL HEALTH: ON AVERAGE, HOW MANY MINUTES DO YOU ENGAGE IN EXERCISE AT THIS LEVEL?: 50 MIN

## 2025-03-13 SDOH — HEALTH STABILITY: PHYSICAL HEALTH: ON AVERAGE, HOW MANY DAYS PER WEEK DO YOU ENGAGE IN MODERATE TO STRENUOUS EXERCISE (LIKE A BRISK WALK)?: 5 DAYS

## 2025-03-13 ASSESSMENT — SOCIAL DETERMINANTS OF HEALTH (SDOH): HOW OFTEN DO YOU GET TOGETHER WITH FRIENDS OR RELATIVES?: TWICE A WEEK

## 2025-03-18 ENCOUNTER — OFFICE VISIT (OUTPATIENT)
Dept: INTERNAL MEDICINE | Facility: CLINIC | Age: 70
End: 2025-03-18
Attending: INTERNAL MEDICINE
Payer: COMMERCIAL

## 2025-03-18 VITALS
SYSTOLIC BLOOD PRESSURE: 122 MMHG | RESPIRATION RATE: 14 BRPM | HEART RATE: 68 BPM | OXYGEN SATURATION: 98 % | WEIGHT: 222.3 LBS | HEIGHT: 73 IN | BODY MASS INDEX: 29.46 KG/M2 | TEMPERATURE: 97.8 F | DIASTOLIC BLOOD PRESSURE: 76 MMHG

## 2025-03-18 DIAGNOSIS — Z00.00 ENCOUNTER FOR MEDICARE ANNUAL WELLNESS EXAM: Primary | ICD-10-CM

## 2025-03-18 DIAGNOSIS — C61 PROSTATE CANCER (H): ICD-10-CM

## 2025-03-18 DIAGNOSIS — E78.2 MIXED HYPERLIPIDEMIA: ICD-10-CM

## 2025-03-18 DIAGNOSIS — Z13.1 SCREENING FOR DIABETES MELLITUS: ICD-10-CM

## 2025-03-18 PROCEDURE — 3074F SYST BP LT 130 MM HG: CPT | Performed by: INTERNAL MEDICINE

## 2025-03-18 PROCEDURE — 36415 COLL VENOUS BLD VENIPUNCTURE: CPT | Performed by: INTERNAL MEDICINE

## 2025-03-18 PROCEDURE — 80061 LIPID PANEL: CPT | Performed by: INTERNAL MEDICINE

## 2025-03-18 PROCEDURE — 3078F DIAST BP <80 MM HG: CPT | Performed by: INTERNAL MEDICINE

## 2025-03-18 PROCEDURE — G0438 PPPS, INITIAL VISIT: HCPCS | Performed by: INTERNAL MEDICINE

## 2025-03-18 PROCEDURE — 82947 ASSAY GLUCOSE BLOOD QUANT: CPT | Performed by: INTERNAL MEDICINE

## 2025-03-18 RX ORDER — SIMVASTATIN 20 MG
20 TABLET ORAL AT BEDTIME
Qty: 90 TABLET | Refills: 3 | Status: SHIPPED | OUTPATIENT
Start: 2025-03-18

## 2025-03-18 RX ORDER — TADALAFIL 5 MG/1
5 TABLET ORAL DAILY
COMMUNITY
Start: 2025-01-13

## 2025-03-18 NOTE — PATIENT INSTRUCTIONS
Patient Education   Preventive Care Advice   This is general advice given by our system to help you stay healthy. However, your care team may have specific advice just for you. Please talk to your care team about your preventive care needs.  Nutrition  Eat 5 or more servings of fruits and vegetables each day.  Try wheat bread, brown rice and whole grain pasta (instead of white bread, rice, and pasta).  Get enough calcium and vitamin D. Check the label on foods and aim for 100% of the RDA (recommended daily allowance).  Lifestyle  Exercise at least 150 minutes each week  (30 minutes a day, 5 days a week).  Do muscle strengthening activities 2 days a week. These help control your weight and prevent disease.  No smoking.  Wear sunscreen to prevent skin cancer.  Have a dental exam and cleaning every 6 months.  Yearly exams  See your health care team every year to talk about:  Any changes in your health.  Any medicines your care team has prescribed.  Preventive care, family planning, and ways to prevent chronic diseases.  Shots (vaccines)   HPV shots (up to age 26), if you've never had them before.  Hepatitis B shots (up to age 59), if you've never had them before.  COVID-19 shot: Get this shot when it's due.  Flu shot: Get a flu shot every year.  Tetanus shot: Get a tetanus shot every 10 years.  Pneumococcal, hepatitis A, and RSV shots: Ask your care team if you need these based on your risk.  Shingles shot (for age 50 and up)  General health tests  Diabetes screening:  Starting at age 35, Get screened for diabetes at least every 3 years.  If you are younger than age 35, ask your care team if you should be screened for diabetes.  Cholesterol test: At age 39, start having a cholesterol test every 5 years, or more often if advised.  Bone density scan (DEXA): At age 50, ask your care team if you should have this scan for osteoporosis (brittle bones).  Hepatitis C: Get tested at least once in your life.  STIs (sexually  transmitted infections)  Before age 24: Ask your care team if you should be screened for STIs.  After age 24: Get screened for STIs if you're at risk. You are at risk for STIs (including HIV) if:  You are sexually active with more than one person.  You don't use condoms every time.  You or a partner was diagnosed with a sexually transmitted infection.  If you are at risk for HIV, ask about PrEP medicine to prevent HIV.  Get tested for HIV at least once in your life, whether you are at risk for HIV or not.  Cancer screening tests  Cervical cancer screening: If you have a cervix, begin getting regular cervical cancer screening tests starting at age 21.  Breast cancer scan (mammogram): If you've ever had breasts, begin having regular mammograms starting at age 40. This is a scan to check for breast cancer.  Colon cancer screening: It is important to start screening for colon cancer at age 45.  Have a colonoscopy test every 10 years (or more often if you're at risk) Or, ask your provider about stool tests like a FIT test every year or Cologuard test every 3 years.  To learn more about your testing options, visit:   .  For help making a decision, visit:   https://bit.ly/lo63692.  Prostate cancer screening test: If you have a prostate, ask your care team if a prostate cancer screening test (PSA) at age 55 is right for you.  Lung cancer screening: If you are a current or former smoker ages 50 to 80, ask your care team if ongoing lung cancer screenings are right for you.  For informational purposes only. Not to replace the advice of your health care provider. Copyright   2023 [facility] [employer]. All rights reserved. Clinically reviewed by the [facility]. [employer] 821955 - REV 01/24.  Hearing Loss: Care Instructions  Overview     Hearing loss is a sudden or slow decrease in how well you hear. It can range from slight to profound. Permanent hearing loss can occur with aging. It also can  happen when you are exposed long-term to loud noise. Examples include listening to loud music, riding motorcycles, or being around other loud machines.  Hearing loss can affect your work and home life. It can make you feel lonely or depressed. You may feel that you have lost your independence. But hearing aids and other devices can help you hear better and feel connected to others.  Follow-up care is a key part of your treatment and safety. Be sure to make and go to all appointments, and call your doctor if you are having problems. It's also a good idea to know your test results and keep a list of the medicines you take.  How can you care for yourself at home?  Avoid loud noises whenever possible. This helps keep your hearing from getting worse.  Always wear hearing protection around loud noises.  Wear a hearing aid as directed.  A professional can help you pick a hearing aid that will work best for you.  You can also get hearing aids over the counter for mild to moderate hearing loss.  Have hearing tests as your doctor suggests. They can show whether your hearing has changed. Your hearing aid may need to be adjusted.  Use other devices as needed. These may include:  Telephone amplifiers and hearing aids that can connect to a television, stereo, radio, or microphone.  Devices that use lights or vibrations. These alert you to the doorbell, a ringing telephone, or a baby monitor.  Television closed-captioning. This shows the words at the bottom of the screen. Most new TVs can do this.  TTY (text telephone). This lets you type messages back and forth on the telephone instead of talking or listening. These devices are also called TDD. When messages are typed on the keyboard, they are sent over the phone line to a receiving TTY. The message is shown on a monitor.  Use text messaging, social media, and email if it is hard for you to communicate by telephone.  Try to learn a listening technique called speechreading. It is  "not lipreading. You pay attention to people's gestures, expressions, posture, and tone of voice. These clues can help you understand what a person is saying. Face the person you are talking to, and have them face you. Make sure the lighting is good. You need to see the other person's face clearly.  Think about counseling if you need help to adjust to your hearing loss.  When should you call for help?  Watch closely for changes in your health, and be sure to contact your doctor if:    You think your hearing is getting worse.     You have new symptoms, such as dizziness or nausea.   Where can you learn more?  Go to https://[email or web address].[employer].net/patiented  Enter R798 in the search box to learn more about \"Hearing Loss: Care Instructions.\"  Current as of: October 27, 2024  Content Version: 14.4    1912-3970 [employer].   Care instructions adapted under license by your healthcare professional. If you have questions about a medical condition or this instruction, always ask your healthcare professional. [employer] disclaims any warranty or liability for your use of this information.    Bladder Training: Care Instructions  Your Care Instructions     Bladder training is used to treat urge incontinence and stress incontinence. Urge incontinence means that the need to urinate comes on so fast that you can't get to a toilet in time. Stress incontinence means that you leak urine because of pressure on your bladder. For example, it may happen when you laugh, cough, or lift something heavy.  Bladder training can increase how long you can wait before you have to urinate. It can also help your bladder hold more urine. And it can give you better control over the urge to urinate.  It is important to remember that bladder training takes a few weeks to a few months to make a difference. You may not see results right away, but don't give up.  Follow-up care is a key part of your treatment and safety. Be sure to make " and go to all appointments, and call your doctor if you are having problems. It's also a good idea to know your test results and keep a list of the medicines you take.  How can you care for yourself at home?  Work with your doctor to come up with a bladder training program that is right for you. You may use one or more of the following methods.  Delayed urination  In the beginning, try to keep from urinating for 5 minutes after you first feel the need to go.  While you wait, take deep, slow breaths to relax. Kegel exercises can also help you delay the need to go to the bathroom.  After some practice, when you can easily wait 5 minutes to urinate, try to wait 10 minutes before you urinate.  Slowly increase the waiting period until you are able to control when you have to urinate.  Scheduled urination  Empty your bladder when you first wake up in the morning.  Schedule times throughout the day when you will urinate.  Start by going to the bathroom every hour, even if you don't need to go.  Slowly increase the time between trips to the bathroom.  When you have found a schedule that works well for you, keep doing it.  If you wake up during the night and have to urinate, do it. Apply your schedule to waking hours only.  Kegel exercises  These tighten and strengthen pelvic muscles, which can help you control the flow of urine. (If doing these exercises causes pain, stop doing them and talk with your doctor.) To do Kegel exercises:  Squeeze your muscles as if you were trying not to pass gas. Or squeeze your muscles as if you were stopping the flow of urine. Your belly, legs, and buttocks shouldn't move.  Hold the squeeze for 3 seconds, then relax for 5 to 10 seconds.  Start with 3 seconds, then add 1 second each week until you are able to squeeze for 10 seconds.  Repeat the exercise 10 times a session. Do 3 to 8 sessions a day.  When should you call for help?  Watch closely for changes in your health, and be sure to  "contact your doctor if:    Your incontinence is getting worse.     You do not get better as expected.   Where can you learn more?  Go to [email or web address]/patiented  Enter V684 in the search box to learn more about \"Bladder Training: Care Instructions.\"  Current as of: April 30, 2024  Content Version: 14.4    [id number] [employer].   Care instructions adapted under license by your healthcare professional. If you have questions about a medical condition or this instruction, always ask your healthcare professional. [employer] disclaims any warranty or liability for your use of this information.       "

## 2025-03-18 NOTE — PROGRESS NOTES
"Preventive Care Visit  [facility]  Antwan Ron MD, Internal Medicine  Mar 18, 2025      Assessment & Plan     Encounter for Medicare annual wellness exam  Updated screening, immunizations, prevention.  Please see health maintenance list, care gaps     Mixed hyperlipidemia- 10 yr CV risk 10.1%  Cont statin for primary prevention   - Lipid panel reflex to direct LDL Fasting; Future  - simvastatin (ZOCOR) 20 MG tablet; Take 1 tablet (20 mg) by mouth at bedtime.  - Lipid panel reflex to direct LDL Fasting    Prostate cancer (H)  Recent surgery - continues w/recovery. Doing well     Screening for diabetes mellitus  - Glucose    Patient has been advised of split billing requirements and indicates understanding: Yes      The longitudinal plan of care for the diagnosis(es)/condition(s) as documented were addressed during this visit. Due to the added complexity in care, I will continue to support Jaswinder in the subsequent management and with ongoing continuity of care.  BMI  Estimated body mass index is 29.73 kg/m  as calculated from the following:    Height as of this encounter: 1.842 m (6' 0.5\").    Weight as of this encounter: 100.8 kg (222 lb 4.8 oz).       Counseling  Appropriate preventive services were addressed with this patient via screening, questionnaire, or discussion as appropriate for fall prevention, nutrition, physical activity, Tobacco-use cessation, social engagement, weight loss and cognition.  Checklist reviewing preventive services available has been given to the patient.  Reviewed patient's diet, addressing concerns and/or questions.   The patient was provided with written information regarding signs of hearing loss.   Information on urinary incontinence and treatment options given to patient.       Regular exercise  See Patient Instructions    Rosy San is a 69 year old, presenting for the following:  Medicare Visit        HPI  Since last seen had robotic " prostatectomy for prostate ca. Has done well. Surgical path w/o any + LN and localized disease.      No other complaints other than expected ED and urinary incontinence at this stage in recovery         Advance Care Planning  Patient does not have a Health Care Directive: Patient states has Advance Directive and will bring in a copy to clinic.      3/13/2025   General Health   How would you rate your overall physical health? Good   Feel stress (tense, anxious, or unable to sleep) Not at all         3/13/2025   Nutrition   Diet: Regular (no restrictions)         3/13/2025   Exercise   Days per week of moderate/strenous exercise 5 days   Average minutes spent exercising at this level 50 min         3/13/2025   Social Factors   Frequency of gathering with friends or relatives Twice a week   Worry food won't last until get money to buy more No   Food not last or not have enough money for food? No   Do you have housing? (Housing is defined as stable permanent housing and does not include staying ouside in a car, in a tent, in an abandoned building, in an overnight shelter, or couch-surfing.) Yes   Are you worried about losing your housing? No   Lack of transportation? No   Unable to get utilities (heat,electricity)? No         3/13/2025   Fall Risk   Fallen 2 or more times in the past year? No    No   Trouble with walking or balance? No    No       Multiple values from one day are sorted in reverse-chronological order          3/13/2025   Activities of Daily Living- Home Safety   Needs help with the following daily activites None of the above   Safety concerns in the home None of the above         3/13/2025   Dental   Dentist two times every year? Yes         3/13/2025   Hearing Screening   Hearing concerns? (!) IT'S HARD TO FOLLOW A CONVERSATION IN A NOISY RESTAURANT OR CROWDED ROOM.         3/13/2025   Driving Risk Screening   Patient/family members have concerns about driving No         3/13/2025   General  Alertness/Fatigue Screening   Have you been more tired than usual lately? No         3/13/2025   Urinary Incontinence Screening   Bothered by leaking urine in past 6 months Yes           Today's PHQ-2 Score:       3/17/2025     5:10 PM   PHQ-2 ( 1999 [employer])   Q1: Little interest or pleasure in doing things 0   Q2: Feeling down, depressed or hopeless 0   PHQ-2 Score 0    Q1: Little interest or pleasure in doing things Not at all   Q2: Feeling down, depressed or hopeless Not at all   PHQ-2 Score 0       Patient-reported           3/13/2025   Substance Use   Alcohol more than 3/day or more than 7/wk No   Do you have a current opioid prescription? No   How severe/bad is pain from 1 to 10? 0/10 (No Pain)   Do you use any other substances recreationally? No     Social History     Tobacco Use    Smoking status: Never    Smokeless tobacco: Never   Substance Use Topics    Alcohol use: Yes     Alcohol/week: 4.0 standard drinks of alcohol     Types: 4 Standard drinks or equivalent per week     Comment: social    Drug use: No           3/13/2025   AAA Screening   Family history of Abdominal Aortic Aneurysm (AAA)? No   Last PSA:   PSA   Date Value Ref Range Status   12/02/2020 4.54 (H) 0 - 4 ug/L Final     Comment:     Assay Method:  Chemiluminescence using Siemens Vista analyzer     PSA Tumor Marker   Date Value Ref Range Status   10/14/2024 9.19 (H) 0.00 - 4.50 ng/mL Final   04/13/2022 6.00 (H) 0.00 - 4.00 ug/L Final     ASCVD Risk   The 10-year ASCVD risk score (Jasper DK, et al., 2019) is: 13.4%    Values used to calculate the score:      Age: 69 years      Sex: Male      Is Non- : No      Diabetic: No      Tobacco smoker: No      Systolic Blood Pressure: 136 mmHg      Is BP treated: No      HDL Cholesterol: 50 mg/dL      Total Cholesterol: 145 mg/dL            Reviewed and updated as needed this visit by Provider                    Lab work is in process  Labs reviewed in EPIC  Current  "providers sharing in care for this patient include:  Patient Care Team:  Antwan Ron MD as PCP - General (Internal Medicine)  Antwan Ron MD as Assigned PCP  Kevin Morales MD as MD (Urology)  Antwan Ron MD as Referring Physician (Internal Medicine)  Kevin Morales MD as Assigned Surgical Provider  Milena Day APRN CNP as Nurse Practitioner (Dermatology)    The following health maintenance items are reviewed in Epic and correct as of today:  Health Maintenance   Topic Date Due    DTAP/TDAP/TD IMMUNIZATION (2 - Td or Tdap) 06/01/2024    INFLUENZA VACCINE (1) 09/01/2024    COVID-19 Vaccine (2 - 2024-25 season) 09/01/2024    MEDICARE ANNUAL WELLNESS VISIT  01/08/2025    ANNUAL REVIEW OF HM ORDERS  01/08/2025    LIPID  01/22/2025    FALL RISK ASSESSMENT  03/18/2026    GLUCOSE  01/22/2027    COLORECTAL CANCER SCREENING  02/16/2028    ADVANCE CARE PLANNING  01/08/2029    RSV VACCINE (1 - 1-dose 75+ series) 07/30/2032    HEPATITIS C SCREENING  Completed    PHQ-2 (once per calendar year)  Completed    Pneumococcal Vaccine: 50+ Years  Completed    ZOSTER IMMUNIZATION  Completed    HPV IMMUNIZATION  Aged Out    MENINGITIS IMMUNIZATION  Aged Out            Objective    Exam  /81   Pulse 68   Temp 97.8  F (36.6  C) (Temporal)   Resp 14   Ht 1.842 m (6' 0.5\")   Wt 100.8 kg (222 lb 4.8 oz)   SpO2 98%   BMI 29.73 kg/m     Estimated body mass index is 29.73 kg/m  as calculated from the following:    Height as of this encounter: 1.842 m (6' 0.5\").    Weight as of this encounter: 100.8 kg (222 lb 4.8 oz).    Physical Exam  GENERAL: alert and no distress  EYES: Eyes grossly normal to inspection, PERRL and conjunctivae and sclerae normal  HENT: ear canals and TM's normal, nose and mouth without ulcers or lesions  NECK: no adenopathy, no asymmetry, masses, or scars  RESP: lungs clear to auscultation - no rales, rhonchi or wheezes  CV: regular rate and rhythm, normal S1 S2, no S3 or S4, " no murmur, click or rub, no peripheral edema  ABDOMEN: soft, nontender, no hepatosplenomegaly, no masses and bowel sounds normal  MS: no gross musculoskeletal defects noted, no edema  SKIN: no suspicious lesions or rashes  NEURO: Normal strength and tone, mentation intact and speech normal  PSYCH: mentation appears normal, affect normal/bright  LYMPH: no cervical adenopathy        3/18/2025   Mini Cog   Clock Draw Score 2 Normal   3 Item Recall 3 objects recalled   Mini Cog Total Score 5              Signed Electronically by: Antwan Ron MD

## 2025-03-19 LAB
CHOLEST SERPL-MCNC: 145 MG/DL
FASTING STATUS PATIENT QL REPORTED: YES
FASTING STATUS PATIENT QL REPORTED: YES
GLUCOSE SERPL-MCNC: 96 MG/DL (ref 70–99)
HDLC SERPL-MCNC: 47 MG/DL
LDLC SERPL CALC-MCNC: 84 MG/DL
NONHDLC SERPL-MCNC: 98 MG/DL
TRIGL SERPL-MCNC: 72 MG/DL

## 2025-04-08 ENCOUNTER — DOCUMENTATION ONLY (OUTPATIENT)
Dept: LAB | Facility: CLINIC | Age: 70
End: 2025-04-08
Payer: COMMERCIAL

## 2025-04-08 NOTE — PROGRESS NOTES
Jaswinder Monroe has an upcoming lab appointment:    Future Appointments   Date Time Provider Department Center   4/14/2025  9:30 AM [facility]   3/17/2026 10:00 AM Antwan Ron MD [facility]   3/31/2026 10:00 AM Antwan Ron MD [facility]     Patient is scheduled for the following lab(s): PSA lab    There is no order available. Please review and place either future orders or HMPO (Review of Health Maintenance Protocol Orders), as appropriate.    There are no preventive care reminders to display for this patient.  Saeid Norton

## 2025-04-09 ENCOUNTER — LAB (OUTPATIENT)
Dept: LAB | Facility: CLINIC | Age: 70
End: 2025-04-09
Payer: COMMERCIAL

## 2025-04-09 DIAGNOSIS — C61 MALIGNANT NEOPLASM OF PROSTATE (H): Primary | ICD-10-CM

## 2025-04-09 PROCEDURE — 36415 COLL VENOUS BLD VENIPUNCTURE: CPT

## 2025-04-09 PROCEDURE — 84153 ASSAY OF PSA TOTAL: CPT

## 2025-04-10 LAB — PSA SERPL DL<=0.01 NG/ML-MCNC: <0.01 NG/ML (ref 0–4.5)

## 2025-07-03 DIAGNOSIS — C61 MALIGNANT NEOPLASM OF PROSTATE (H): ICD-10-CM

## 2025-07-03 DIAGNOSIS — C61 PRIMARY MALIGNANT NEOPLASM OF PROSTATE (H): Primary | ICD-10-CM

## 2025-07-23 ENCOUNTER — LAB (OUTPATIENT)
Dept: LAB | Facility: CLINIC | Age: 70
End: 2025-07-23
Payer: COMMERCIAL

## 2025-07-23 DIAGNOSIS — C61 PRIMARY MALIGNANT NEOPLASM OF PROSTATE (H): ICD-10-CM

## 2025-07-23 PROCEDURE — G0103 PSA SCREENING: HCPCS

## 2025-07-23 PROCEDURE — 36415 COLL VENOUS BLD VENIPUNCTURE: CPT

## 2025-07-24 LAB — PSA SERPL DL<=0.01 NG/ML-MCNC: <0.01 NG/ML (ref 0–4.5)

## (undated) DEVICE — KIT ENDO TURNOVER/PROCEDURE W/CLEAN A SCOPE LINERS 103888

## (undated) RX ORDER — FENTANYL CITRATE 50 UG/ML
INJECTION, SOLUTION INTRAMUSCULAR; INTRAVENOUS
Status: DISPENSED
Start: 2018-02-16